# Patient Record
Sex: FEMALE | Race: WHITE | NOT HISPANIC OR LATINO | Employment: OTHER | URBAN - METROPOLITAN AREA
[De-identification: names, ages, dates, MRNs, and addresses within clinical notes are randomized per-mention and may not be internally consistent; named-entity substitution may affect disease eponyms.]

---

## 2017-06-12 ENCOUNTER — TRANSCRIBE ORDERS (OUTPATIENT)
Dept: ADMINISTRATIVE | Facility: HOSPITAL | Age: 62
End: 2017-06-12

## 2017-06-12 DIAGNOSIS — Z12.31 VISIT FOR SCREENING MAMMOGRAM: Primary | ICD-10-CM

## 2017-07-19 ENCOUNTER — HOSPITAL ENCOUNTER (OUTPATIENT)
Dept: MAMMOGRAPHY | Facility: HOSPITAL | Age: 62
Discharge: HOME | End: 2017-07-19
Admitting: FAMILY MEDICINE

## 2017-07-19 DIAGNOSIS — Z12.31 VISIT FOR SCREENING MAMMOGRAM: ICD-10-CM

## 2017-07-19 PROCEDURE — 77067 SCR MAMMO BI INCL CAD: CPT | Performed by: RADIOLOGY

## 2017-07-19 PROCEDURE — G0202 SCR MAMMO BI INCL CAD: HCPCS

## 2017-07-19 PROCEDURE — 77063 BREAST TOMOSYNTHESIS BI: CPT

## 2017-07-19 PROCEDURE — 77063 BREAST TOMOSYNTHESIS BI: CPT | Performed by: RADIOLOGY

## 2017-09-13 ENCOUNTER — HOSPITAL ENCOUNTER (EMERGENCY)
Facility: HOSPITAL | Age: 62
Discharge: HOME | End: 2017-09-13
Attending: EMERGENCY MEDICINE | Admitting: EMERGENCY MEDICINE

## 2017-09-13 ENCOUNTER — APPOINTMENT (OUTPATIENT)
Dept: CARDIOLOGY | Facility: HOSPITAL | Age: 62
End: 2017-09-13
Attending: EMERGENCY MEDICINE

## 2017-09-13 VITALS
BODY MASS INDEX: 29.73 KG/M2 | WEIGHT: 185 LBS | HEIGHT: 66 IN | DIASTOLIC BLOOD PRESSURE: 89 MMHG | SYSTOLIC BLOOD PRESSURE: 151 MMHG | RESPIRATION RATE: 16 BRPM | TEMPERATURE: 97.6 F | HEART RATE: 81 BPM | OXYGEN SATURATION: 96 %

## 2017-09-13 DIAGNOSIS — M79.10 MUSCLE SORENESS: ICD-10-CM

## 2017-09-13 DIAGNOSIS — E86.0 DEHYDRATION: Primary | ICD-10-CM

## 2017-09-13 LAB
ALBUMIN SERPL-MCNC: 4.7 G/DL (ref 3.2–4.8)
ALBUMIN/GLOB SERPL: 2 G/DL (ref 1.5–2.5)
ALP SERPL-CCNC: 74 U/L (ref 25–100)
ALT SERPL W P-5'-P-CCNC: 36 U/L (ref 7–40)
ANION GAP SERPL CALCULATED.3IONS-SCNC: 5 MMOL/L (ref 3–11)
AST SERPL-CCNC: 32 U/L (ref 0–33)
BASOPHILS # BLD AUTO: 0.02 10*3/MM3 (ref 0–0.2)
BASOPHILS NFR BLD AUTO: 0.4 % (ref 0–1)
BH CV LOWER VASCULAR LEFT COMMON FEMORAL AUGMENT: NORMAL
BH CV LOWER VASCULAR LEFT COMMON FEMORAL COMPRESS: NORMAL
BH CV LOWER VASCULAR LEFT COMMON FEMORAL PHASIC: NORMAL
BH CV LOWER VASCULAR LEFT COMMON FEMORAL SPONT: NORMAL
BH CV LOWER VASCULAR LEFT DISTAL FEMORAL COMPRESS: NORMAL
BH CV LOWER VASCULAR LEFT GASTRONEMIUS COMPRESS: NORMAL
BH CV LOWER VASCULAR LEFT GREATER SAPH AK COMPRESS: NORMAL
BH CV LOWER VASCULAR LEFT GREATER SAPH BK COMPRESS: NORMAL
BH CV LOWER VASCULAR LEFT LESSER SAPH COMPRESS: NORMAL
BH CV LOWER VASCULAR LEFT MID FEMORAL AUGMENT: NORMAL
BH CV LOWER VASCULAR LEFT MID FEMORAL COMPRESS: NORMAL
BH CV LOWER VASCULAR LEFT MID FEMORAL PHASIC: NORMAL
BH CV LOWER VASCULAR LEFT MID FEMORAL SPONT: NORMAL
BH CV LOWER VASCULAR LEFT PERONEAL COMPRESS: NORMAL
BH CV LOWER VASCULAR LEFT POPLITEAL AUGMENT: NORMAL
BH CV LOWER VASCULAR LEFT POPLITEAL COMPRESS: NORMAL
BH CV LOWER VASCULAR LEFT POPLITEAL PHASIC: NORMAL
BH CV LOWER VASCULAR LEFT POPLITEAL SPONT: NORMAL
BH CV LOWER VASCULAR LEFT POSTERIOR TIBIAL COMPRESS: NORMAL
BH CV LOWER VASCULAR LEFT PROXIMAL FEMORAL COMPRESS: NORMAL
BH CV LOWER VASCULAR LEFT SAPHENOFEMORAL JUNCTION AUGMENT: NORMAL
BH CV LOWER VASCULAR LEFT SAPHENOFEMORAL JUNCTION COMPRESS: NORMAL
BH CV LOWER VASCULAR LEFT SAPHENOFEMORAL JUNCTION PHASIC: NORMAL
BH CV LOWER VASCULAR LEFT SAPHENOFEMORAL JUNCTION SPONT: NORMAL
BH CV LOWER VASCULAR RIGHT COMMON FEMORAL AUGMENT: NORMAL
BH CV LOWER VASCULAR RIGHT COMMON FEMORAL COMPRESS: NORMAL
BH CV LOWER VASCULAR RIGHT COMMON FEMORAL PHASIC: NORMAL
BH CV LOWER VASCULAR RIGHT COMMON FEMORAL SPONT: NORMAL
BH CV LOWER VASCULAR RIGHT DISTAL FEMORAL COMPRESS: NORMAL
BH CV LOWER VASCULAR RIGHT GASTRONEMIUS COMPRESS: NORMAL
BH CV LOWER VASCULAR RIGHT GREATER SAPH AK COMPRESS: NORMAL
BH CV LOWER VASCULAR RIGHT GREATER SAPH BK COMPRESS: NORMAL
BH CV LOWER VASCULAR RIGHT LESSER SAPH COMPRESS: NORMAL
BH CV LOWER VASCULAR RIGHT MID FEMORAL AUGMENT: NORMAL
BH CV LOWER VASCULAR RIGHT MID FEMORAL COMPETENT: NORMAL
BH CV LOWER VASCULAR RIGHT MID FEMORAL COMPRESS: NORMAL
BH CV LOWER VASCULAR RIGHT MID FEMORAL PHASIC: NORMAL
BH CV LOWER VASCULAR RIGHT MID FEMORAL SPONT: NORMAL
BH CV LOWER VASCULAR RIGHT PERONEAL COMPRESS: NORMAL
BH CV LOWER VASCULAR RIGHT POPLITEAL AUGMENT: NORMAL
BH CV LOWER VASCULAR RIGHT POPLITEAL COMPRESS: NORMAL
BH CV LOWER VASCULAR RIGHT POPLITEAL PHASIC: NORMAL
BH CV LOWER VASCULAR RIGHT POPLITEAL SPONT: NORMAL
BH CV LOWER VASCULAR RIGHT POSTERIOR TIBIAL COMPRESS: NORMAL
BH CV LOWER VASCULAR RIGHT PROXIMAL FEMORAL COMPRESS: NORMAL
BH CV LOWER VASCULAR RIGHT SAPHENOFEMORAL JUNCTION AUGMENT: NORMAL
BH CV LOWER VASCULAR RIGHT SAPHENOFEMORAL JUNCTION COMPRESS: NORMAL
BH CV LOWER VASCULAR RIGHT SAPHENOFEMORAL JUNCTION PHASIC: NORMAL
BH CV LOWER VASCULAR RIGHT SAPHENOFEMORAL JUNCTION SPONT: NORMAL
BILIRUB SERPL-MCNC: 0.7 MG/DL (ref 0.3–1.2)
BUN BLD-MCNC: 18 MG/DL (ref 9–23)
BUN/CREAT SERPL: 22.5 (ref 7–25)
CALCIUM SPEC-SCNC: 9.3 MG/DL (ref 8.7–10.4)
CHLORIDE SERPL-SCNC: 105 MMOL/L (ref 99–109)
CK SERPL-CCNC: 232 U/L (ref 26–174)
CO2 SERPL-SCNC: 27 MMOL/L (ref 20–31)
CREAT BLD-MCNC: 0.8 MG/DL (ref 0.6–1.3)
DEPRECATED RDW RBC AUTO: 39.5 FL (ref 37–54)
EOSINOPHIL # BLD AUTO: 0.17 10*3/MM3 (ref 0–0.3)
EOSINOPHIL NFR BLD AUTO: 3 % (ref 0–3)
ERYTHROCYTE [DISTWIDTH] IN BLOOD BY AUTOMATED COUNT: 12.3 % (ref 11.3–14.5)
GFR SERPL CREATININE-BSD FRML MDRD: 73 ML/MIN/1.73
GLOBULIN UR ELPH-MCNC: 2.4 GM/DL
GLUCOSE BLD-MCNC: 78 MG/DL (ref 70–100)
HCT VFR BLD AUTO: 45.1 % (ref 34.5–44)
HGB BLD-MCNC: 15.1 G/DL (ref 11.5–15.5)
IMM GRANULOCYTES # BLD: 0.02 10*3/MM3 (ref 0–0.03)
IMM GRANULOCYTES NFR BLD: 0.4 % (ref 0–0.6)
LYMPHOCYTES # BLD AUTO: 1.85 10*3/MM3 (ref 0.6–4.8)
LYMPHOCYTES NFR BLD AUTO: 32.5 % (ref 24–44)
MCH RBC QN AUTO: 29 PG (ref 27–31)
MCHC RBC AUTO-ENTMCNC: 33.5 G/DL (ref 32–36)
MCV RBC AUTO: 86.7 FL (ref 80–99)
MONOCYTES # BLD AUTO: 0.75 10*3/MM3 (ref 0–1)
MONOCYTES NFR BLD AUTO: 13.2 % (ref 0–12)
NEUTROPHILS # BLD AUTO: 2.89 10*3/MM3 (ref 1.5–8.3)
NEUTROPHILS NFR BLD AUTO: 50.5 % (ref 41–71)
PLATELET # BLD AUTO: 178 10*3/MM3 (ref 150–450)
PMV BLD AUTO: 9 FL (ref 6–12)
POTASSIUM BLD-SCNC: 3.9 MMOL/L (ref 3.5–5.5)
PROT SERPL-MCNC: 7.1 G/DL (ref 5.7–8.2)
RBC # BLD AUTO: 5.2 10*6/MM3 (ref 3.89–5.14)
SODIUM BLD-SCNC: 137 MMOL/L (ref 132–146)
WBC NRBC COR # BLD: 5.7 10*3/MM3 (ref 3.5–10.8)

## 2017-09-13 PROCEDURE — 80053 COMPREHEN METABOLIC PANEL: CPT | Performed by: EMERGENCY MEDICINE

## 2017-09-13 PROCEDURE — 93970 EXTREMITY STUDY: CPT

## 2017-09-13 PROCEDURE — 85025 COMPLETE CBC W/AUTO DIFF WBC: CPT | Performed by: EMERGENCY MEDICINE

## 2017-09-13 PROCEDURE — 99283 EMERGENCY DEPT VISIT LOW MDM: CPT

## 2017-09-13 PROCEDURE — 93970 EXTREMITY STUDY: CPT | Performed by: INTERNAL MEDICINE

## 2017-09-13 PROCEDURE — 82550 ASSAY OF CK (CPK): CPT | Performed by: EMERGENCY MEDICINE

## 2017-09-13 RX ORDER — SODIUM CHLORIDE 0.9 % (FLUSH) 0.9 %
10 SYRINGE (ML) INJECTION AS NEEDED
Status: DISCONTINUED | OUTPATIENT
Start: 2017-09-13 | End: 2017-09-13 | Stop reason: HOSPADM

## 2017-09-13 RX ORDER — TRAMADOL HYDROCHLORIDE 50 MG/1
50 TABLET ORAL EVERY 6 HOURS PRN
Qty: 20 TABLET | Refills: 0 | Status: SHIPPED | OUTPATIENT
Start: 2017-09-13 | End: 2022-12-13

## 2017-09-13 RX ORDER — TRAMADOL HYDROCHLORIDE 50 MG/1
50 TABLET ORAL ONCE
Status: COMPLETED | OUTPATIENT
Start: 2017-09-13 | End: 2017-09-13

## 2017-09-13 RX ADMIN — SODIUM CHLORIDE 1000 ML: 9 INJECTION, SOLUTION INTRAVENOUS at 12:19

## 2017-09-13 RX ADMIN — TRAMADOL HYDROCHLORIDE 50 MG: 50 TABLET, COATED ORAL at 12:14

## 2017-09-13 NOTE — ED PROVIDER NOTES
"Subjective   HPI Comments: Stephani Paulson is a 61 y.o.female who presents to the ED with c/o leg pain after prolonged immobilization. She reports that she recently returned from a trip to [place] three days ago. Since arriving home, she has experienced an itchy rash and a gradually worsening, \"stabbing\" pain in the bilateral lower extremities from mid thigh down to the ankles that is worse posteriorly. She came to the ED to rule out DVT. She denies leg swelling, fever, chest pain, difficulty breathing, abdominal pain, or vomiting. Other than some intermittent nausea, urinary frequency, and mild constipation, she has no other acute complaints at this time.     She does take an estrogen/progesterone supplement and thyroid medication. She has no history of prior DVT's/PE's. She denies any recent surgeries.     Patient is a 61 y.o. female presenting with lower extremity pain.   History provided by:  Patient  Lower Extremity Issue   Location:  Leg  Time since incident:  3 days  Injury: no    Leg location:  L leg and R leg  Pain details:     Quality:  Sharp (\"stabbing\")    Radiates to:  Does not radiate    Severity:  Moderate    Onset quality:  Gradual    Duration:  3 days    Timing:  Constant    Progression:  Worsening  Chronicity:  New  Dislocation: no    Foreign body present:  No foreign bodies  Prior injury to area:  No  Relieved by:  Nothing  Worsened by:  Nothing  Ineffective treatments:  None tried  Associated symptoms: no back pain, no fever, no neck pain, no numbness, no swelling and no tingling    Risk factors comment:  Recent prolonged travel/immobilization      Review of Systems   Constitutional: Negative for chills and fever.   HENT: Negative for congestion, rhinorrhea, sore throat and trouble swallowing.    Respiratory: Negative for cough and shortness of breath.    Cardiovascular: Negative for chest pain and leg swelling.   Gastrointestinal: Positive for constipation and nausea. Negative for " "abdominal pain, diarrhea and vomiting.   Genitourinary: Positive for frequency. Negative for dysuria and hematuria.   Musculoskeletal: Negative for back pain and neck pain.        Pain in the bilateral lower extremities   Skin: Positive for rash.   Neurological: Negative for dizziness, weakness and headaches.   All other systems reviewed and are negative.      History reviewed. No pertinent past medical history.    No Known Allergies    Past Surgical History:   Procedure Laterality Date   • BREAST EXCISIONAL BIOPSY Left 1980   • HYSTERECTOMY  2004   • OOPHORECTOMY Bilateral 2004   • REDUCTION MAMMAPLASTY Bilateral 2013    AND LIFT       Family History   Problem Relation Age of Onset   • Ovarian cancer Neg Hx    • Breast cancer Neg Hx        Social History     Social History   • Marital status:      Spouse name: N/A   • Number of children: N/A   • Years of education: N/A     Social History Main Topics   • Smoking status: Never Smoker   • Smokeless tobacco: None   • Alcohol use No   • Drug use: No   • Sexual activity: Not Asked     Other Topics Concern   • None     Social History Narrative   • None         Objective   Physical Exam   Constitutional: She is oriented to person, place, and time. She appears well-developed and well-nourished. No distress.   HENT:   Head: Normocephalic and atraumatic.   Nose: Nose normal.   Mouth/Throat: Oropharynx is clear and moist.   Eyes: Conjunctivae are normal. No scleral icterus.   Neck: Normal range of motion. Neck supple.   Cardiovascular: Normal rate, regular rhythm and normal heart sounds.    No murmur heard.  Pulmonary/Chest: Effort normal and breath sounds normal. No respiratory distress. She has no wheezes. She has no rales.   Abdominal: Soft. Bowel sounds are normal. She exhibits no mass. There is no tenderness. There is no rebound and no guarding.   Musculoskeletal: Normal range of motion. She exhibits tenderness (mild \"tingling\"). She exhibits no edema.   The " patient reports tingles to palpation of the muscles in the bilateral calves and quadricepts. She denies tenderness to palpation of the thighs. There is no swelling of the legs. No significant edema. Equal calf size.   Neurological: She is alert and oriented to person, place, and time.   Skin: Skin is warm and dry. Rash noted. No erythema.   Previous rash to the bilateral inguinal region appears to be improving with small scabs present. No significant redness.   Psychiatric: She has a normal mood and affect. Her behavior is normal.   Nursing note and vitals reviewed.      Procedures         ED Course  ED Course   Value Comment By Time    Dr. Cornelius is at the bedside reevaluating the patient. She does not meet criteria for rhabdomyolysis. Most likely etiology is sore muscles from prolonged immobilization, dehydration, and travel. Will continue to administer IV fluids and discharge with advise to limit physical activity and push fluids for the next few days. Jessica Romero 09/13 1303   Creatine Kinase: (!) 232 (Reviewed) Jessica Romero 09/13 1308     Recent Results (from the past 24 hour(s))   Comprehensive Metabolic Panel    Collection Time: 09/13/17 12:07 PM   Result Value Ref Range    Glucose 78 70 - 100 mg/dL    BUN 18 9 - 23 mg/dL    Creatinine 0.80 0.60 - 1.30 mg/dL    Sodium 137 132 - 146 mmol/L    Potassium 3.9 3.5 - 5.5 mmol/L    Chloride 105 99 - 109 mmol/L    CO2 27.0 20.0 - 31.0 mmol/L    Calcium 9.3 8.7 - 10.4 mg/dL    Total Protein 7.1 5.7 - 8.2 g/dL    Albumin 4.70 3.20 - 4.80 g/dL    ALT (SGPT) 36 7 - 40 U/L    AST (SGOT) 32 0 - 33 U/L    Alkaline Phosphatase 74 25 - 100 U/L    Total Bilirubin 0.7 0.3 - 1.2 mg/dL    eGFR Non African Amer 73 >60 mL/min/1.73    Globulin 2.4 gm/dL    A/G Ratio 2.0 1.5 - 2.5 g/dL    BUN/Creatinine Ratio 22.5 7.0 - 25.0    Anion Gap 5.0 3.0 - 11.0 mmol/L   CK    Collection Time: 09/13/17 12:07 PM   Result Value Ref Range    Creatine Kinase 232 (H) 26 - 174 U/L   CBC Auto  Differential    Collection Time: 09/13/17 12:07 PM   Result Value Ref Range    WBC 5.70 3.50 - 10.80 10*3/mm3    RBC 5.20 (H) 3.89 - 5.14 10*6/mm3    Hemoglobin 15.1 11.5 - 15.5 g/dL    Hematocrit 45.1 (H) 34.5 - 44.0 %    MCV 86.7 80.0 - 99.0 fL    MCH 29.0 27.0 - 31.0 pg    MCHC 33.5 32.0 - 36.0 g/dL    RDW 12.3 11.3 - 14.5 %    RDW-SD 39.5 37.0 - 54.0 fl    MPV 9.0 6.0 - 12.0 fL    Platelets 178 150 - 450 10*3/mm3    Neutrophil % 50.5 41.0 - 71.0 %    Lymphocyte % 32.5 24.0 - 44.0 %    Monocyte % 13.2 (H) 0.0 - 12.0 %    Eosinophil % 3.0 0.0 - 3.0 %    Basophil % 0.4 0.0 - 1.0 %    Immature Grans % 0.4 0.0 - 0.6 %    Neutrophils, Absolute 2.89 1.50 - 8.30 10*3/mm3    Lymphocytes, Absolute 1.85 0.60 - 4.80 10*3/mm3    Monocytes, Absolute 0.75 0.00 - 1.00 10*3/mm3    Eosinophils, Absolute 0.17 0.00 - 0.30 10*3/mm3    Basophils, Absolute 0.02 0.00 - 0.20 10*3/mm3    Immature Grans, Absolute 0.02 0.00 - 0.03 10*3/mm3   Duplex Venous Lower Extremity - Bilateral CAR    Collection Time: 09/13/17 12:09 PM   Result Value Ref Range    Right Common Femoral Spont Y     Right Common Femoral Phasic Y     Right Common Femoral Augment Y     Right Common Femoral Compress C     Right Saphenofemoral Junction Spont Y     Right Saphenofemoral Junction Phasic Y     Right Saphenofemoral Junction Augment Y     Right Saphenofemoral Junction Compress C     Right Proximal Femoral Compress C     Right Mid Femoral Spont Y     Right Mid Femoral Phasic Y     Right Mid Femoral Augment Y     Right Mid Femoral Compress C     Right Distal Femoral Compress C     Right Popliteal Spont Y     Right Popliteal Phasic Y     Right Popliteal Augment Y     Right Popliteal Compress C     Right Posterior Tibial Compress C     Right Peroneal Compress C     Right GastronemiusSoleal Compress C     Right Greater Saph AK Compress C     Right Greater Saph BK Compress C     Right Lesser Saph Compress C     Left Common Femoral Spont Y     Left Common Femoral Phasic Y  "    Left Common Femoral Augment Y     Left Common Femoral Compress C     Left Saphenofemoral Junction Spont Y     Left Saphenofemoral Junction Phasic Y     Left Saphenofemoral Junction Augment Y     Left Saphenofemoral Junction Compress C     Left Proximal Femoral Compress C     Left Mid Femoral Spont Y     Left Mid Femoral Phasic Y     Left Mid Femoral Augment Y     Left Mid Femoral Compress C     Left Distal Femoral Compress C     Left Popliteal Spont Y     Left Popliteal Phasic Y     Left Popliteal Augment Y     Left Popliteal Compress C     Left Posterior Tibial Compress C     Left Peroneal Compress C     Left GastronemiusSoleal Compress C     Left Greater Saph AK Compress C     Left Greater Saph BK Compress C     Left Lesser Saph Compress C     Right Mid Femoral Competent Y      Note: In addition to lab results from this visit, the labs listed above may include labs taken at another facility or during a different encounter within the last 24 hours. Please correlate lab times with ED admission and discharge times for further clarification of the services performed during this visit.    No orders to display     Vitals:    09/13/17 1058 09/13/17 1059   BP:  (!) 153/112   BP Location:  Right arm   Patient Position:  Lying   Pulse:  84   Resp:  18   Temp:  98.1 °F (36.7 °C)   TempSrc:  Oral   SpO2:  98%   Weight: 185 lb (83.9 kg)    Height: 66\" (167.6 cm)      Medications   sodium chloride 0.9 % flush 10 mL (not administered)   sodium chloride 0.9 % bolus 1,000 mL (1,000 mL Intravenous New Bag 9/13/17 1219)   traMADol (ULTRAM) tablet 50 mg (50 mg Oral Given 9/13/17 1214)     ECG/EMG Results (last 24 hours)     ** No results found for the last 24 hours. **                       MDM  Number of Diagnoses or Management Options  Dehydration: new and requires workup  Muscle soreness: new and requires workup  Diagnosis management comments: Presentation is consistent with muscle soreness.    Bilateral venous duplex does not " show DVT.    Laboratory evaluation shows mild dehydration, and mildly elevated creatinine kinase.    Current symptoms are felt to be muscle soreness.    Given Ultram to help control pain, if it is not controlled by Tylenol and ibuprofen over-the-counter.    Patient given IV fluids here in ER, will be advised to drink plenty of fluids at home.    Advised to stretch, and is okay to perform light activity but no strenuous or prolonged activity.       Amount and/or Complexity of Data Reviewed  Clinical lab tests: ordered and reviewed  Tests in the radiology section of CPT®: ordered and reviewed  Obtain history from someone other than the patient: yes  Review and summarize past medical records: yes  Independent visualization of images, tracings, or specimens: yes    Patient Progress  Patient progress: stable      Final diagnoses:   Dehydration   Muscle soreness       Documentation assistance provided by alis Romero.  Information recorded by the scribe was done at my direction and has been verified and validated by me.     Jessica Romero  09/13/17 1134       Miriam Cornelius MD  09/13/17 1321       Miriam Cornelius MD  09/13/17 3365

## 2017-09-13 NOTE — DISCHARGE INSTRUCTIONS
Push fluids for the next few days.    Limit physical activity as needed for sore muscles.     Immediately return to the emergency department if you develop new or worsening symptoms.    Followup with PCP within next week for recheck.       Hypertension  Hypertension, commonly called high blood pressure, is when the force of blood pumping through your arteries is too strong. Your arteries are the blood vessels that carry blood from your heart throughout your body. A blood pressure reading consists of a higher number over a lower number, such as 110/72. The higher number (systolic) is the pressure inside your arteries when your heart pumps. The lower number (diastolic) is the pressure inside your arteries when your heart relaxes. Ideally you want your blood pressure below 120/80.  Hypertension forces your heart to work harder to pump blood. Your arteries may become narrow or stiff. Having untreated or uncontrolled hypertension can cause heart attack, stroke, kidney disease, and other problems.  RISK FACTORS  Some risk factors for high blood pressure are controllable. Others are not.   Risk factors you cannot control include:   · Race. You may be at higher risk if you are .  · Age. Risk increases with age.  · Gender. Men are at higher risk than women before age 45 years. After age 65, women are at higher risk than men.  Risk factors you can control include:  · Not getting enough exercise or physical activity.  · Being overweight.  · Getting too much fat, sugar, calories, or salt in your diet.  · Drinking too much alcohol.  SIGNS AND SYMPTOMS  Hypertension does not usually cause signs or symptoms. Extremely high blood pressure (hypertensive crisis) may cause headache, anxiety, shortness of breath, and nosebleed.  DIAGNOSIS  To check if you have hypertension, your health care provider will measure your blood pressure while you are seated, with your arm held at the level of your heart. It should be  measured at least twice using the same arm. Certain conditions can cause a difference in blood pressure between your right and left arms. A blood pressure reading that is higher than normal on one occasion does not mean that you need treatment. If it is not clear whether you have high blood pressure, you may be asked to return on a different day to have your blood pressure checked again. Or, you may be asked to monitor your blood pressure at home for 1 or more weeks.  TREATMENT  Treating high blood pressure includes making lifestyle changes and possibly taking medicine. Living a healthy lifestyle can help lower high blood pressure. You may need to change some of your habits.  Lifestyle changes may include:  · Following the DASH diet. This diet is high in fruits, vegetables, and whole grains. It is low in salt, red meat, and added sugars.  · Keep your sodium intake below 2,300 mg per day.  · Getting at least 30-45 minutes of aerobic exercise at least 4 times per week.  · Losing weight if necessary.  · Not smoking.  · Limiting alcoholic beverages.  · Learning ways to reduce stress.  Your health care provider may prescribe medicine if lifestyle changes are not enough to get your blood pressure under control, and if one of the following is true:  · You are 18-59 years of age and your systolic blood pressure is above 140.  · You are 60 years of age or older, and your systolic blood pressure is above 150.  · Your diastolic blood pressure is above 90.  · You have diabetes, and your systolic blood pressure is over 140 or your diastolic blood pressure is over 90.  · You have kidney disease and your blood pressure is above 140/90.  · You have heart disease and your blood pressure is above 140/90.  Your personal target blood pressure may vary depending on your medical conditions, your age, and other factors.  HOME CARE INSTRUCTIONS  · Have your blood pressure rechecked as directed by your health care provider.    · Take  medicines only as directed by your health care provider. Follow the directions carefully. Blood pressure medicines must be taken as prescribed. The medicine does not work as well when you skip doses. Skipping doses also puts you at risk for problems.  · Do not smoke.    · Monitor your blood pressure at home as directed by your health care provider.   SEEK MEDICAL CARE IF:   · You think you are having a reaction to medicines taken.  · You have recurrent headaches or feel dizzy.  · You have swelling in your ankles.  · You have trouble with your vision.  SEEK IMMEDIATE MEDICAL CARE IF:  · You develop a severe headache or confusion.  · You have unusual weakness, numbness, or feel faint.  · You have severe chest or abdominal pain.  · You vomit repeatedly.  · You have trouble breathing.  MAKE SURE YOU:   · Understand these instructions.  · Will watch your condition.  · Will get help right away if you are not doing well or get worse.     This information is not intended to replace advice given to you by your health care provider. Make sure you discuss any questions you have with your health care provider.     Document Released: 12/18/2006 Document Revised: 05/03/2016 Document Reviewed: 10/10/2014  [employer] Patient Education ©2017 [employer]

## 2018-06-12 ENCOUNTER — TRANSCRIBE ORDERS (OUTPATIENT)
Dept: ADMINISTRATIVE | Facility: HOSPITAL | Age: 63
End: 2018-06-12

## 2018-06-12 DIAGNOSIS — Z12.31 VISIT FOR SCREENING MAMMOGRAM: Primary | ICD-10-CM

## 2018-07-24 ENCOUNTER — HOSPITAL ENCOUNTER (OUTPATIENT)
Dept: MAMMOGRAPHY | Facility: HOSPITAL | Age: 63
Discharge: HOME | End: 2018-07-24
Admitting: FAMILY MEDICINE

## 2018-07-24 DIAGNOSIS — Z12.31 VISIT FOR SCREENING MAMMOGRAM: ICD-10-CM

## 2018-07-24 PROCEDURE — 77067 SCR MAMMO BI INCL CAD: CPT | Performed by: RADIOLOGY

## 2018-07-24 PROCEDURE — 77063 BREAST TOMOSYNTHESIS BI: CPT | Performed by: RADIOLOGY

## 2018-07-24 PROCEDURE — 77067 SCR MAMMO BI INCL CAD: CPT

## 2018-07-24 PROCEDURE — 77063 BREAST TOMOSYNTHESIS BI: CPT

## 2018-08-01 ENCOUNTER — HOSPITAL ENCOUNTER (OUTPATIENT)
Dept: MAMMOGRAPHY | Facility: HOSPITAL | Age: 63
Discharge: HOME | End: 2018-08-01
Admitting: RADIOLOGY

## 2018-08-01 DIAGNOSIS — R92.8 ABNORMAL MAMMOGRAM: ICD-10-CM

## 2018-08-01 PROCEDURE — G0279 TOMOSYNTHESIS, MAMMO: HCPCS

## 2018-08-01 PROCEDURE — 77065 DX MAMMO INCL CAD UNI: CPT

## 2018-08-01 PROCEDURE — 77061 BREAST TOMOSYNTHESIS UNI: CPT | Performed by: RADIOLOGY

## 2018-08-01 PROCEDURE — 77066 DX MAMMO INCL CAD BI: CPT

## 2018-08-01 PROCEDURE — 77065 DX MAMMO INCL CAD UNI: CPT | Performed by: RADIOLOGY

## 2019-02-20 ENCOUNTER — TRANSCRIBE ORDERS (OUTPATIENT)
Dept: ADMINISTRATIVE | Facility: HOSPITAL | Age: 64
End: 2019-02-20

## 2019-02-20 DIAGNOSIS — R92.8 ABNORMAL MAMMOGRAM: Primary | ICD-10-CM

## 2019-03-26 ENCOUNTER — TRANSCRIBE ORDERS (OUTPATIENT)
Dept: MAMMOGRAPHY | Facility: HOSPITAL | Age: 64
End: 2019-03-26

## 2019-03-26 ENCOUNTER — HOSPITAL ENCOUNTER (OUTPATIENT)
Dept: MAMMOGRAPHY | Facility: HOSPITAL | Age: 64
Discharge: HOME | End: 2019-03-26
Admitting: FAMILY MEDICINE

## 2019-03-26 DIAGNOSIS — R92.8 ABNORMAL MAMMOGRAM: ICD-10-CM

## 2019-03-26 DIAGNOSIS — Z12.31 VISIT FOR SCREENING MAMMOGRAM: Primary | ICD-10-CM

## 2019-03-26 PROCEDURE — 77065 DX MAMMO INCL CAD UNI: CPT | Performed by: RADIOLOGY

## 2019-03-26 PROCEDURE — 77065 DX MAMMO INCL CAD UNI: CPT

## 2019-03-27 ENCOUNTER — APPOINTMENT (OUTPATIENT)
Dept: MAMMOGRAPHY | Facility: HOSPITAL | Age: 64
End: 2019-03-27

## 2019-04-23 ENCOUNTER — TELEPHONE (OUTPATIENT)
Dept: MRI IMAGING | Facility: HOSPITAL | Age: 64
End: 2019-04-23

## 2019-04-23 NOTE — TELEPHONE ENCOUNTER
Pt called the  and said she had Breast US done at [facility] and she would not be needing the Breast MRI. It was cancelled for April 30th.

## 2019-04-30 ENCOUNTER — APPOINTMENT (OUTPATIENT)
Dept: MRI IMAGING | Facility: HOSPITAL | Age: 64
End: 2019-04-30

## 2019-05-29 ENCOUNTER — APPOINTMENT (OUTPATIENT)
Dept: MAMMOGRAPHY | Facility: HOSPITAL | Age: 64
End: 2019-05-29

## 2019-07-30 ENCOUNTER — APPOINTMENT (OUTPATIENT)
Dept: MAMMOGRAPHY | Facility: HOSPITAL | Age: 64
End: 2019-07-30

## 2019-08-15 ENCOUNTER — HOSPITAL ENCOUNTER (OUTPATIENT)
Dept: MAMMOGRAPHY | Facility: HOSPITAL | Age: 64
Discharge: HOME | End: 2019-08-15
Admitting: FAMILY MEDICINE

## 2019-08-15 DIAGNOSIS — Z12.31 VISIT FOR SCREENING MAMMOGRAM: ICD-10-CM

## 2019-08-15 PROCEDURE — 77063 BREAST TOMOSYNTHESIS BI: CPT | Performed by: RADIOLOGY

## 2019-08-15 PROCEDURE — 77063 BREAST TOMOSYNTHESIS BI: CPT

## 2019-08-15 PROCEDURE — 77067 SCR MAMMO BI INCL CAD: CPT | Performed by: RADIOLOGY

## 2019-08-15 PROCEDURE — 77067 SCR MAMMO BI INCL CAD: CPT

## 2020-01-02 ENCOUNTER — HOSPITAL ENCOUNTER (EMERGENCY)
Facility: HOSPITAL | Age: 65
Discharge: HOME | End: 2020-01-02
Attending: EMERGENCY MEDICINE | Admitting: EMERGENCY MEDICINE

## 2020-01-02 ENCOUNTER — APPOINTMENT (OUTPATIENT)
Dept: GENERAL RADIOLOGY | Facility: HOSPITAL | Age: 65
End: 2020-01-02

## 2020-01-02 VITALS
RESPIRATION RATE: 16 BRPM | HEART RATE: 84 BPM | DIASTOLIC BLOOD PRESSURE: 70 MMHG | BODY MASS INDEX: 30.53 KG/M2 | WEIGHT: 190 LBS | HEIGHT: 66 IN | TEMPERATURE: 98 F | OXYGEN SATURATION: 97 % | SYSTOLIC BLOOD PRESSURE: 147 MMHG

## 2020-01-02 DIAGNOSIS — J20.8 ACUTE BRONCHITIS, BACTERIAL: Primary | ICD-10-CM

## 2020-01-02 DIAGNOSIS — B96.89 ACUTE BRONCHITIS, BACTERIAL: Primary | ICD-10-CM

## 2020-01-02 LAB
ALBUMIN SERPL-MCNC: 4.6 G/DL (ref 3.5–5.2)
ALBUMIN/GLOB SERPL: 2 G/DL
ALP SERPL-CCNC: 68 U/L (ref 39–117)
ALT SERPL W P-5'-P-CCNC: 32 U/L (ref 1–33)
ANION GAP SERPL CALCULATED.3IONS-SCNC: 11 MMOL/L (ref 5–15)
AST SERPL-CCNC: 22 U/L (ref 1–32)
BASOPHILS # BLD AUTO: 0.05 10*3/MM3 (ref 0–0.2)
BASOPHILS NFR BLD AUTO: 0.7 % (ref 0–1.5)
BILIRUB SERPL-MCNC: 0.3 MG/DL (ref 0.2–1.2)
BUN BLD-MCNC: 18 MG/DL (ref 8–23)
BUN/CREAT SERPL: 24.7 (ref 7–25)
CALCIUM SPEC-SCNC: 9.8 MG/DL (ref 8.6–10.5)
CHLORIDE SERPL-SCNC: 105 MMOL/L (ref 98–107)
CO2 SERPL-SCNC: 25 MMOL/L (ref 22–29)
CREAT BLD-MCNC: 0.73 MG/DL (ref 0.57–1)
DEPRECATED RDW RBC AUTO: 38.9 FL (ref 37–54)
EOSINOPHIL # BLD AUTO: 0.16 10*3/MM3 (ref 0–0.4)
EOSINOPHIL NFR BLD AUTO: 2.2 % (ref 0.3–6.2)
ERYTHROCYTE [DISTWIDTH] IN BLOOD BY AUTOMATED COUNT: 11.9 % (ref 12.3–15.4)
GFR SERPL CREATININE-BSD FRML MDRD: 81 ML/MIN/1.73
GLOBULIN UR ELPH-MCNC: 2.3 GM/DL
GLUCOSE BLD-MCNC: 86 MG/DL (ref 65–99)
HCT VFR BLD AUTO: 43.4 % (ref 34–46.6)
HGB BLD-MCNC: 14.3 G/DL (ref 12–15.9)
IMM GRANULOCYTES # BLD AUTO: 0.03 10*3/MM3 (ref 0–0.05)
IMM GRANULOCYTES NFR BLD AUTO: 0.4 % (ref 0–0.5)
LYMPHOCYTES # BLD AUTO: 1.77 10*3/MM3 (ref 0.7–3.1)
LYMPHOCYTES NFR BLD AUTO: 24.1 % (ref 19.6–45.3)
MCH RBC QN AUTO: 29.5 PG (ref 26.6–33)
MCHC RBC AUTO-ENTMCNC: 32.9 G/DL (ref 31.5–35.7)
MCV RBC AUTO: 89.7 FL (ref 79–97)
MONOCYTES # BLD AUTO: 0.67 10*3/MM3 (ref 0.1–0.9)
MONOCYTES NFR BLD AUTO: 9.1 % (ref 5–12)
NEUTROPHILS # BLD AUTO: 4.66 10*3/MM3 (ref 1.7–7)
NEUTROPHILS NFR BLD AUTO: 63.5 % (ref 42.7–76)
NRBC BLD AUTO-RTO: 0 /100 WBC (ref 0–0.2)
NT-PROBNP SERPL-MCNC: 90 PG/ML (ref 5–900)
PLATELET # BLD AUTO: 239 10*3/MM3 (ref 140–450)
PMV BLD AUTO: 9.3 FL (ref 6–12)
POTASSIUM BLD-SCNC: 4.3 MMOL/L (ref 3.5–5.2)
PROT SERPL-MCNC: 6.9 G/DL (ref 6–8.5)
RBC # BLD AUTO: 4.84 10*6/MM3 (ref 3.77–5.28)
SODIUM BLD-SCNC: 141 MMOL/L (ref 136–145)
TROPONIN T SERPL-MCNC: <0.01 NG/ML (ref 0–0.03)
WBC NRBC COR # BLD: 7.34 10*3/MM3 (ref 3.4–10.8)

## 2020-01-02 PROCEDURE — 94640 AIRWAY INHALATION TREATMENT: CPT

## 2020-01-02 PROCEDURE — 85025 COMPLETE CBC W/AUTO DIFF WBC: CPT | Performed by: NURSE PRACTITIONER

## 2020-01-02 PROCEDURE — 80053 COMPREHEN METABOLIC PANEL: CPT | Performed by: NURSE PRACTITIONER

## 2020-01-02 PROCEDURE — 93005 ELECTROCARDIOGRAM TRACING: CPT | Performed by: EMERGENCY MEDICINE

## 2020-01-02 PROCEDURE — 83880 ASSAY OF NATRIURETIC PEPTIDE: CPT | Performed by: NURSE PRACTITIONER

## 2020-01-02 PROCEDURE — 99283 EMERGENCY DEPT VISIT LOW MDM: CPT

## 2020-01-02 PROCEDURE — 71046 X-RAY EXAM CHEST 2 VIEWS: CPT

## 2020-01-02 PROCEDURE — 84484 ASSAY OF TROPONIN QUANT: CPT | Performed by: NURSE PRACTITIONER

## 2020-01-02 RX ORDER — AZITHROMYCIN 250 MG/1
TABLET, FILM COATED ORAL
Qty: 6 TABLET | Refills: 0 | Status: SHIPPED | OUTPATIENT
Start: 2020-01-02 | End: 2022-12-13

## 2020-01-02 RX ORDER — ALBUTEROL SULFATE 90 UG/1
2 AEROSOL, METERED RESPIRATORY (INHALATION) EVERY 4 HOURS PRN
Qty: 1 INHALER | Refills: 0 | Status: SHIPPED | OUTPATIENT
Start: 2020-01-02 | End: 2022-12-13

## 2020-01-02 RX ORDER — CHLORAL HYDRATE 500 MG
CAPSULE ORAL DAILY
COMMUNITY
End: 2022-12-13

## 2020-01-02 RX ORDER — PREDNISONE 20 MG/1
40 TABLET ORAL DAILY
Qty: 8 TABLET | Refills: 0 | Status: SHIPPED | OUTPATIENT
Start: 2020-01-02 | End: 2022-12-13

## 2020-01-02 RX ORDER — ALBUTEROL SULFATE 2.5 MG/3ML
2.5 SOLUTION RESPIRATORY (INHALATION) ONCE
Status: COMPLETED | OUTPATIENT
Start: 2020-01-02 | End: 2020-01-02

## 2020-01-02 RX ADMIN — HYDROCODONE POLISTIREX AND CHLORPHENIRAMINE POLISTIREX 5 ML: 10; 8 SUSPENSION, EXTENDED RELEASE ORAL at 12:04

## 2020-01-02 RX ADMIN — ALBUTEROL SULFATE 2.5 MG: 2.5 SOLUTION RESPIRATORY (INHALATION) at 13:01
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
## 2020-07-06 ENCOUNTER — TRANSCRIBE ORDERS (OUTPATIENT)
Dept: ADMINISTRATIVE | Facility: HOSPITAL | Age: 65
End: 2020-07-06

## 2020-07-06 DIAGNOSIS — Z12.31 VISIT FOR SCREENING MAMMOGRAM: Primary | ICD-10-CM

## 2020-09-01 ENCOUNTER — HOSPITAL ENCOUNTER (OUTPATIENT)
Dept: MAMMOGRAPHY | Facility: HOSPITAL | Age: 65
Discharge: HOME | End: 2020-09-01
Admitting: FAMILY MEDICINE

## 2020-09-01 DIAGNOSIS — Z12.31 VISIT FOR SCREENING MAMMOGRAM: ICD-10-CM

## 2020-09-01 PROCEDURE — 77063 BREAST TOMOSYNTHESIS BI: CPT

## 2020-09-01 PROCEDURE — 77063 BREAST TOMOSYNTHESIS BI: CPT | Performed by: RADIOLOGY

## 2020-09-01 PROCEDURE — 77067 SCR MAMMO BI INCL CAD: CPT | Performed by: RADIOLOGY

## 2020-09-01 PROCEDURE — 77067 SCR MAMMO BI INCL CAD: CPT

## 2020-09-24 ENCOUNTER — APPOINTMENT (OUTPATIENT)
Dept: MAMMOGRAPHY | Facility: HOSPITAL | Age: 65
End: 2020-09-24

## 2020-10-07 ENCOUNTER — HOSPITAL ENCOUNTER (EMERGENCY)
Facility: HOSPITAL | Age: 65
Discharge: HOME | End: 2020-10-07
Attending: EMERGENCY MEDICINE | Admitting: EMERGENCY MEDICINE

## 2020-10-07 ENCOUNTER — APPOINTMENT (OUTPATIENT)
Dept: CT IMAGING | Facility: HOSPITAL | Age: 65
End: 2020-10-07

## 2020-10-07 VITALS
DIASTOLIC BLOOD PRESSURE: 94 MMHG | RESPIRATION RATE: 16 BRPM | SYSTOLIC BLOOD PRESSURE: 168 MMHG | BODY MASS INDEX: 31.34 KG/M2 | OXYGEN SATURATION: 98 % | TEMPERATURE: 97.7 F | WEIGHT: 195 LBS | HEART RATE: 95 BPM | HEIGHT: 66 IN

## 2020-10-07 DIAGNOSIS — F07.81 POST CONCUSSION SYNDROME: ICD-10-CM

## 2020-10-07 DIAGNOSIS — S06.9X9A HEAD INJURY, CLOSED, WITH LOC OF UNKNOWN DURATION (HCC): Primary | ICD-10-CM

## 2020-10-07 DIAGNOSIS — S01.01XA LACERATION OF SCALP, INITIAL ENCOUNTER: ICD-10-CM

## 2020-10-07 PROCEDURE — 90715 TDAP VACCINE 7 YRS/> IM: CPT | Performed by: PHYSICIAN ASSISTANT

## 2020-10-07 PROCEDURE — 70450 CT HEAD/BRAIN W/O DYE: CPT

## 2020-10-07 PROCEDURE — 72125 CT NECK SPINE W/O DYE: CPT

## 2020-10-07 PROCEDURE — 90471 IMMUNIZATION ADMIN: CPT | Performed by: PHYSICIAN ASSISTANT

## 2020-10-07 PROCEDURE — 99284 EMERGENCY DEPT VISIT MOD MDM: CPT

## 2020-10-07 PROCEDURE — 99283 EMERGENCY DEPT VISIT LOW MDM: CPT

## 2020-10-07 PROCEDURE — 25010000002 TDAP 5-2.5-18.5 LF-MCG/0.5 SUSPENSION: Performed by: PHYSICIAN ASSISTANT

## 2020-10-07 RX ORDER — BUTALBITAL, ACETAMINOPHEN AND CAFFEINE 50; 325; 40 MG/1; MG/1; MG/1
1 TABLET ORAL EVERY 4 HOURS PRN
Status: DISCONTINUED | OUTPATIENT
Start: 2020-10-07 | End: 2020-10-07 | Stop reason: HOSPADM

## 2020-10-07 RX ORDER — ONDANSETRON 4 MG/1
4 TABLET, FILM COATED ORAL EVERY 8 HOURS PRN
Qty: 20 TABLET | Refills: 0 | Status: SHIPPED | OUTPATIENT
Start: 2020-10-07 | End: 2022-12-13

## 2020-10-07 RX ORDER — BUTALBITAL, ACETAMINOPHEN AND CAFFEINE 50; 325; 40 MG/1; MG/1; MG/1
1-2 TABLET ORAL EVERY 6 HOURS PRN
Qty: 15 TABLET | Refills: 0 | Status: SHIPPED | OUTPATIENT
Start: 2020-10-07 | End: 2022-12-13

## 2020-10-07 RX ORDER — LIDOCAINE HYDROCHLORIDE AND EPINEPHRINE 10; 10 MG/ML; UG/ML
10 INJECTION, SOLUTION INFILTRATION; PERINEURAL ONCE
Status: COMPLETED | OUTPATIENT
Start: 2020-10-07 | End: 2020-10-07

## 2020-10-07 RX ORDER — CEPHALEXIN 500 MG/1
500 CAPSULE ORAL 2 TIMES DAILY
Qty: 10 CAPSULE | Refills: 0 | Status: SHIPPED | OUTPATIENT
Start: 2020-10-07 | End: 2022-12-13

## 2020-10-07 RX ADMIN — LIDOCAINE HYDROCHLORIDE,EPINEPHRINE BITARTRATE 10 ML: 10; .01 INJECTION, SOLUTION INFILTRATION; PERINEURAL at 20:29

## 2020-10-07 RX ADMIN — TETANUS TOXOID, REDUCED DIPHTHERIA TOXOID AND ACELLULAR PERTUSSIS VACCINE, ADSORBED 0.5 ML: 5; 2.5; 8; 8; 2.5 SUSPENSION INTRAMUSCULAR at 20:29

## 2020-10-07 RX ADMIN — BUTALBITAL, ACETAMINOPHEN, AND CAFFEINE 1 TABLET: 50; 325; 40 TABLET ORAL at 20:20

## 2020-10-07 NOTE — ED PROVIDER NOTES
EMERGENCY DEPARTMENT ENCOUNTER    Room Number:  02/02  Date of encounter:  10/7/2020  PCP: Dulce Maria Gonzalez DO  Historian: Patient      HPI:  Chief Complaint: Fall from horse, head injury, scalp laceration, loss of consciousness.    A complete HPI/ROS/PMH/PSH/SH/FH are unobtainable due to: NA    Context: Stephani Paulson is a 64 y.o. female who presents to the ED c/o of fall from her horse today around 2:30 PM patient states her horse was spooked, she was thrown from the horse, landing on her buttocks, then on the back of her head on a gravel road.  Her  was present and states she had a loss of consciousness for approximately 5 to 10 minutes is followed by temporary amnesia of the situation, confusion that cleared over the next 30 minutes.  She complains of some diffuse neck pain and stiffness, no upper mid or lower back pain.  Generalized throbbing headache with no vision changes or photophobia.  No nausea or vomiting.  She cannot recall her last tetanus immunization.  She has a scalp laceration to the occipital area.  He denies other symptoms or injuries at this time.      PAST MEDICAL HISTORY  Active Ambulatory Problems     Diagnosis Date Noted   • No Active Ambulatory Problems     Resolved Ambulatory Problems     Diagnosis Date Noted   • No Resolved Ambulatory Problems     Past Medical History:   Diagnosis Date   • Breast injury 03/2017         PAST SURGICAL HISTORY  Past Surgical History:   Procedure Laterality Date   • AUGMENTATION MAMMAPLASTY Bilateral 01/2017   • BREAST EXCISIONAL BIOPSY Left 1980   • HYSTERECTOMY  2004   • OOPHORECTOMY Bilateral 2004   • REDUCTION MAMMAPLASTY Bilateral 2013    WITH MASTOPEXY         FAMILY HISTORY  Family History   Problem Relation Age of Onset   • Ovarian cancer Neg Hx    • Breast cancer Neg Hx          SOCIAL HISTORY  Social History     Socioeconomic History   • Marital status:      Spouse name: Not on file   • Number of children: Not on file    • Years of education: Not on file   • Highest education level: Not on file   Tobacco Use   • Smoking status: Never Smoker   Substance and Sexual Activity   • Alcohol use: No   • Drug use: No         ALLERGIES  Patient has no known allergies.        REVIEW OF SYSTEMS  Review of Systems     All systems reviewed and negative except for those discussed in HPI.       PHYSICAL EXAM    I have reviewed the triage vital signs and nursing notes.    ED Triage Vitals [10/07/20 1637]   Temp Heart Rate Resp BP SpO2   97.7 °F (36.5 °C) 111 18 (!) 170/104 98 %      Temp src Heart Rate Source Patient Position BP Location FiO2 (%)   -- Monitor Sitting Left arm --       Physical Exam  GENERAL: Does not awake alert and oriented, not toxic appearing, hemodynamically stable.  Well developed.  Appears in no acute distress.  HENT: Nares patent.  No facial asymmetry.  She has a 3 cm partial-thickness laceration to the occipital scalp.  No bony tenderness crepitus or step-off.  Bleeding controlled at time of exam.  EYES: No scleral icterus sclera and conjunctive are clear.  Visual acuity is grossly intact.  EOMI PERRL  CV: Regular rhythm, regular rate, no murmurs rubs or gallops.  RESPIRATORY: Normal effort.  No audible wheezes, rales or rhonchi  ABDOMEN: Soft, nontender  MUSCULOSKELETAL: No deformities.   NEURO: Alert, moves all extremities, follows commands cranial nerves grossly intact, no gross sensorimotor deficits.  Proprioception is normal.  SKIN: Warm, dry, no rash visualized.  3 cm partial-thickness laceration to occipital scalp.        LAB RESULTS  No results found for this or any previous visit (from the past 24 hour(s)).    Ordered the above labs and independently reviewed the results.        RADIOLOGY  Ct Head Without Contrast    Result Date: 10/7/2020  EXAMINATION: CT HEAD WO CONTRAST-, CT CERVICAL SPINE WO CONTRAST-  INDICATION: THROWN FROM HORSE, HEAD INJURY, +LOC  TECHNIQUE: Axial noncontrast CT of the head and cervical  spine with multiplanar reconstructions.  The radiation dose reduction device was turned on for each scan per the ALARA (As Low as Reasonably Achievable) protocol.  COMPARISON: NONE  FINDINGS: CT head: Gray-white differentiation is maintained and there is no evidence of intracranial hemorrhage, mass or mass effect. The ventricles are normal in size and configuration. No extra-axial hemorrhage. The orbits are normal and the paranasal sinuses are grossly clear. The calvarium is intact. Left occipital/parietal scalp hematoma noted.  CT C-spine: Vertebral body heights are maintained and alignment is anatomic, without evidence of acute fracture or subluxation. The paraspinal soft tissues are unremarkable. Moderate multilevel cervical spondylosis changes are noted and appear most significant at C5-6 and C6-7.      No acute intracranial abnormality. Left parietal scalp hematoma/laceration noted.  No acute fracture or subluxation of the cervical spine. Multilevel spondylosis changes are noted which appear most significant at C5-6 and C6-7.   This report was finalized on 10/7/2020 5:16 PM by Aníbal Perez.      Ct Cervical Spine Without Contrast    Result Date: 10/7/2020  EXAMINATION: CT HEAD WO CONTRAST-, CT CERVICAL SPINE WO CONTRAST-  INDICATION: THROWN FROM HORSE, HEAD INJURY, +LOC  TECHNIQUE: Axial noncontrast CT of the head and cervical spine with multiplanar reconstructions.  The radiation dose reduction device was turned on for each scan per the ALARA (As Low as Reasonably Achievable) protocol.  COMPARISON: NONE  FINDINGS: CT head: Gray-white differentiation is maintained and there is no evidence of intracranial hemorrhage, mass or mass effect. The ventricles are normal in size and configuration. No extra-axial hemorrhage. The orbits are normal and the paranasal sinuses are grossly clear. The calvarium is intact. Left occipital/parietal scalp hematoma noted.  CT C-spine: Vertebral body heights are maintained and  alignment is anatomic, without evidence of acute fracture or subluxation. The paraspinal soft tissues are unremarkable. Moderate multilevel cervical spondylosis changes are noted and appear most significant at C5-6 and C6-7.      No acute intracranial abnormality. Left parietal scalp hematoma/laceration noted.  No acute fracture or subluxation of the cervical spine. Multilevel spondylosis changes are noted which appear most significant at C5-6 and C6-7.   This report was finalized on 10/7/2020 5:16 PM by Aníbal Perez.              PROCEDURES    Laceration Repair    Date/Time: 10/7/2020 11:34 PM  Performed by: Bismark Dowd PA-C  Authorized by: Jose Osborne MD     Consent:     Consent obtained:  Verbal    Consent given by:  Patient    Risks discussed:  Infection  Anesthesia (see MAR for exact dosages):     Anesthesia method:  Local infiltration    Local anesthetic:  Lidocaine 1% WITH epi  Laceration details:     Location:  Scalp    Scalp location:  Occipital    Length (cm):  3    Depth (mm):  4  Repair type:     Repair type:  Simple  Pre-procedure details:     Preparation:  Patient was prepped and draped in usual sterile fashion  Exploration:     Hemostasis achieved with:  Epinephrine    Wound exploration: entire depth of wound probed and visualized      Wound extent: no foreign bodies/material noted    Treatment:     Area cleansed with:  Betadine    Amount of cleaning:  Standard    Irrigation solution:  Sterile saline    Visualized foreign bodies/material removed: no    Skin repair:     Repair method:  Staples    Number of staples:  4  Approximation:     Approximation:  Close  Post-procedure details:     Dressing:  Open (no dressing)    Patient tolerance of procedure:  Tolerated well, no immediate complications          MEDICATIONS GIVEN IN ER    Medications   butalbital-acetaminophen-caffeine (FIORICET, ESGIC) -40 MG per tablet 1 tablet (1 tablet Oral Given 10/7/20 2020)   Tdap (BOOSTRIX)  injection 0.5 mL (0.5 mL Intramuscular Given 10/7/20 2029)   lidocaine-EPINEPHrine (XYLOCAINE W/EPI) 1 %-1:[id number] injection 10 mL (10 mL Injection Given 10/7/20 2029)         PROGRESS, DATA ANALYSIS, CONSULTS, AND MEDICAL DECISION MAKING    All labs have been independently reviewed by me.  All radiology studies have been reviewed by me and the radiologist dictating the report.   EKG's have been independently viewed and interpreted by me.                 Patient remained stable throughout course of stay in emergency department.  Immunization updated.  Will discharge with a short-term prescription of Fioricet for headache.  Zofran for nausea and Keflex 500 mg twice daily x5 days.  Wound recheck in 2 days, staple removal in 7 to 10 days.  Signs and symptoms of worsening head injury reviewed with patient and her .  Advised to have a low threshold to return if any change or worsening of symptoms.  They verbalized understanding and are agreeable to plan.    AS OF 23:36 EDT VITALS:    BP - 168/94  HR - 95  TEMP - 97.7 °F (36.5 °C)  O2 SATS - 98%        DIAGNOSIS  Final diagnoses:   Head injury, closed, with LOC of unknown duration (CMS/HCC)   Post concussion syndrome   Laceration of scalp, initial encounter         DISPOSITION  DISCHARGE    Patient discharged in stable condition.    Reviewed implications of results, diagnosis, meds, responsibility to follow up, warning signs and symptoms of possible worsening, potential complications and reasons to return to ER.    Patient/Family voiced understanding of above instructions.    Discussed plan for discharge, as there is no emergent indication for admission.  Pt/family is agreeable and understands need for follow up and possible repeat testing.  Pt/family is aware that discharge does not mean that nothing is wrong but that it indicates no emergency is currently present that requires admission and they must continue care with follow-up as given below or with a physician  of their choice.     FOLLOW-UP  Dulce Maria Gonzalez DO  [address]  [address]  [place] [id number] [phone]    Schedule an appointment as soon as possible for a visit   As needed         Medication List      New Prescriptions    butalbital-acetaminophen-caffeine -40 MG per tablet  Commonly known as: FIORICET, ESGIC  Take 1-2 tablets by mouth Every 6 (Six) Hours As Needed for Headache.     cephalexin 500 MG capsule  Commonly known as: KEFLEX  Take 1 capsule by mouth 2 (Two) Times a Day.     ondansetron 4 MG tablet  Commonly known as: Zofran  Take 1 tablet by mouth Every 8 (Eight) Hours As Needed for Nausea or Vomiting.           Where to Get Your Medications      You can get these medications from any pharmacy    Bring a paper prescription for each of these medications  · butalbital-acetaminophen-caffeine -40 MG per tablet  · cephalexin 500 MG capsule  · ondansetron 4 MG tablet                  Bismark Dowd PA-C  10/07/20 [id number]

## 2020-10-08 NOTE — DISCHARGE INSTRUCTIONS
Keep wound clean and dry.  Recheck in 2 days, staple removal in 7 to 10 days.  Observe for any sign of worsening head injury.  Return to the emergency department immediately for any change or worsening of symptoms.

## 2021-07-26 ENCOUNTER — TRANSCRIBE ORDERS (OUTPATIENT)
Dept: ADMINISTRATIVE | Facility: HOSPITAL | Age: 66
End: 2021-07-26

## 2021-07-26 DIAGNOSIS — Z12.31 VISIT FOR SCREENING MAMMOGRAM: Primary | ICD-10-CM

## 2021-09-07 ENCOUNTER — HOSPITAL ENCOUNTER (OUTPATIENT)
Dept: MAMMOGRAPHY | Facility: HOSPITAL | Age: 66
Discharge: HOME | End: 2021-09-07
Admitting: FAMILY MEDICINE

## 2021-09-07 DIAGNOSIS — Z12.31 VISIT FOR SCREENING MAMMOGRAM: ICD-10-CM

## 2021-09-07 PROCEDURE — 77063 BREAST TOMOSYNTHESIS BI: CPT | Performed by: RADIOLOGY

## 2021-09-07 PROCEDURE — 77067 SCR MAMMO BI INCL CAD: CPT

## 2021-09-07 PROCEDURE — 77067 SCR MAMMO BI INCL CAD: CPT | Performed by: RADIOLOGY

## 2021-09-07 PROCEDURE — 77063 BREAST TOMOSYNTHESIS BI: CPT

## 2022-07-28 ENCOUNTER — TRANSCRIBE ORDERS (OUTPATIENT)
Dept: ADMINISTRATIVE | Facility: HOSPITAL | Age: 67
End: 2022-07-28

## 2022-07-28 DIAGNOSIS — Z12.31 VISIT FOR SCREENING MAMMOGRAM: Primary | ICD-10-CM

## 2022-09-27 ENCOUNTER — HOSPITAL ENCOUNTER (OUTPATIENT)
Dept: MAMMOGRAPHY | Facility: HOSPITAL | Age: 67
Discharge: HOME | End: 2022-09-27
Admitting: FAMILY MEDICINE

## 2022-09-27 DIAGNOSIS — Z12.31 VISIT FOR SCREENING MAMMOGRAM: ICD-10-CM

## 2022-09-27 PROCEDURE — 77067 SCR MAMMO BI INCL CAD: CPT | Performed by: RADIOLOGY

## 2022-09-27 PROCEDURE — 77063 BREAST TOMOSYNTHESIS BI: CPT | Performed by: RADIOLOGY

## 2022-09-27 PROCEDURE — 77063 BREAST TOMOSYNTHESIS BI: CPT

## 2022-09-27 PROCEDURE — 77067 SCR MAMMO BI INCL CAD: CPT

## 2022-12-13 ENCOUNTER — OFFICE VISIT (OUTPATIENT)
Dept: INTERNAL MEDICINE | Facility: CLINIC | Age: 67
End: 2022-12-13

## 2022-12-13 VITALS
HEIGHT: 64 IN | HEART RATE: 80 BPM | DIASTOLIC BLOOD PRESSURE: 78 MMHG | BODY MASS INDEX: 31.99 KG/M2 | OXYGEN SATURATION: 99 % | TEMPERATURE: 98.4 F | WEIGHT: 187.4 LBS | SYSTOLIC BLOOD PRESSURE: 130 MMHG

## 2022-12-13 DIAGNOSIS — M25.562 CHRONIC PAIN OF LEFT KNEE: ICD-10-CM

## 2022-12-13 DIAGNOSIS — Z83.79 FAMILY HISTORY OF FATTY LIVER: Chronic | ICD-10-CM

## 2022-12-13 DIAGNOSIS — E66.09 CLASS 1 OBESITY DUE TO EXCESS CALORIES WITH SERIOUS COMORBIDITY AND BODY MASS INDEX (BMI) OF 31.0 TO 31.9 IN ADULT: Chronic | ICD-10-CM

## 2022-12-13 DIAGNOSIS — G89.29 CHRONIC PAIN OF LEFT KNEE: ICD-10-CM

## 2022-12-13 DIAGNOSIS — L40.9 PSORIASIS OF SCALP: Chronic | ICD-10-CM

## 2022-12-13 DIAGNOSIS — J30.2 PERENNIAL ALLERGIC RHINITIS WITH SEASONAL VARIATION: Chronic | ICD-10-CM

## 2022-12-13 DIAGNOSIS — Z78.9 STATIN INTOLERANCE: Chronic | ICD-10-CM

## 2022-12-13 DIAGNOSIS — J30.89 PERENNIAL ALLERGIC RHINITIS WITH SEASONAL VARIATION: Chronic | ICD-10-CM

## 2022-12-13 DIAGNOSIS — B00.1 HERPES LABIALIS WITHOUT COMPLICATION: Chronic | ICD-10-CM

## 2022-12-13 DIAGNOSIS — E78.2 MIXED HYPERLIPIDEMIA: Primary | Chronic | ICD-10-CM

## 2022-12-13 PROBLEM — E66.811 CLASS 1 OBESITY DUE TO EXCESS CALORIES WITH SERIOUS COMORBIDITY AND BODY MASS INDEX (BMI) OF 31.0 TO 31.9 IN ADULT: Chronic | Status: ACTIVE | Noted: 2022-12-13

## 2022-12-13 PROCEDURE — 99205 OFFICE O/P NEW HI 60 MIN: CPT | Performed by: INTERNAL MEDICINE

## 2022-12-13 RX ORDER — VALACYCLOVIR HYDROCHLORIDE 500 MG/1
500 TABLET, FILM COATED ORAL DAILY
COMMUNITY
Start: 2022-08-15

## 2022-12-13 RX ORDER — FLUOCINONIDE TOPICAL SOLUTION USP, 0.05% 0.5 MG/ML
SOLUTION TOPICAL
COMMUNITY

## 2022-12-13 RX ORDER — TIRZEPATIDE 5 MG/.5ML
5 INJECTION, SOLUTION SUBCUTANEOUS
COMMUNITY

## 2022-12-13 RX ORDER — LEVOTHYROXINE AND LIOTHYRONINE 19; 4.5 UG/1; UG/1
TABLET ORAL
COMMUNITY
End: 2022-12-13

## 2022-12-13 RX ORDER — ESTRADIOL 0.05 MG/D
1 FILM, EXTENDED RELEASE TRANSDERMAL 2 TIMES WEEKLY
COMMUNITY
Start: 2022-10-12

## 2022-12-13 RX ORDER — ROSUVASTATIN CALCIUM 5 MG/1
5 TABLET, COATED ORAL NIGHTLY
Qty: 90 TABLET | Refills: 1 | Status: SHIPPED | OUTPATIENT
Start: 2022-12-13 | End: 2023-02-28 | Stop reason: SDUPTHER

## 2022-12-13 RX ORDER — LORATADINE 10 MG/1
10 TABLET ORAL DAILY
COMMUNITY
Start: 2022-10-09

## 2022-12-13 RX ORDER — TRIAMCINOLONE ACETONIDE 55 UG/1
2 SPRAY, METERED NASAL DAILY
COMMUNITY
End: 2022-12-13

## 2022-12-13 RX ORDER — CLOBETASOL PROPIONATE 0.46 MG/ML
SOLUTION TOPICAL
COMMUNITY

## 2022-12-13 NOTE — PATIENT INSTRUCTIONS
Patient Instructions   Problem List Items Addressed This Visit          Allergies and Adverse Reactions    Statin intolerance (Chronic)    Overview     Patient recalls taking atorvastatin many years ago and having muscle aching.  She does not recall trying any other statins.         Perennial allergic rhinitis with seasonal variation (Chronic)    Current Assessment & Plan     - Continue Nasacort nasal spray every day.            Cardiac and Vasculature    Mixed hyperlipidemia - Primary (Chronic)    Overview     Her latest LDL measure and 2022 was 183 with predominance of small LDL at 1513.  She has low HDL.  She has a strong family history of hyperlipidemia.  She has a brother who  of MI at age 65.  Several aunts and uncles on both parents side had heart disease.         Current Assessment & Plan     - Her latest LDL measure in 2022 was 183 mg/dL with predominance of small LDL at 1513 nmol/L and she has low HDL. There is a strong family history of hyperlipidemia and she has a brother who  of MI at age 65. Several aunts and uncles on both parent's side had heart disease.   - We will start a small dose of rosuvastatin 5 mg tablet. She is advised to take 1 tablet once per week in the evening. She may take it with the evening meal or anytime between there and bedtime.   - After about 1 month if she is tolerating the rosuvastatin, she will increase it to 2 tablets per week on Monday and Thursday. After another month if she is tolerating that, she will increase to 3 nights per week and gradually try to build up to every night.   - Begin CoQ10 400 mg capsule every day to prevent statin side effects.  - She will let us know if she is having trouble increasing her dose.  - Continue to decrease the fats and sugars in her diet.   - Continue to increase exercise.   - Weigh once per week at home to monitor progress on weight loss.          Relevant Medications    rosuvastatin (CRESTOR) 5 MG tablet    Other  Relevant Orders    Comprehensive Metabolic Panel    Lipid Panel       Endocrine and Metabolic    Class 1 obesity due to excess calories with serious comorbidity and body mass index (BMI) of 31.0 to 31.9 in adult (Chronic)    Current Assessment & Plan     - See plan for hyperlipidemia above.            Family History    Family history of fatty liver (Chronic)    Overview     Family history of fatty liver in her mother, father, brother, and 2 sisters.         Current Assessment & Plan     - We plan to do an ultrasound with elastography after she returns to [place] in the spring.            Infectious Diseases    Herpes labialis without complication (Chronic)    Current Assessment & Plan     - Continue to use valacyclovir daily as needed.         Relevant Medications    valACYclovir (VALTREX) 500 MG tablet       Musculoskeletal and Injuries    Chronic pain of left knee    Overview     MRI of the left knee done 9/21/2021 showed a chronic tear of the ACL with moderately advanced medial compartment osteoarthritis.  Degenerative maceration and complex tearing and extrusion of the medial meniscus.  Also degenerative tearing of the anterior horn of the lateral meniscus.  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  Popliteal tendon sheath ganglion cyst.  Degenerative interstitial tearing at the origin of the medial head of the gastrocnemius.  Taking aleve as needed.         Current Assessment & Plan     - We will refer her to Dr. Lipscomb for an appointment in 04/2023, when she returns to [place] from [place].   - Advised to use a cold pack on the knee after exercise and for pain, swelling, or inflammation.   - May take Aleve as needed. Advised to take Aleve with food to protect the stomach.  - May also take Tylenol as needed and use Voltaren gel as needed.          Relevant Orders    Ambulatory Referral to Orthopedic Surgery       Skin    Psoriasis of scalp (Chronic)     Current Assessment & Plan     - Encouraged to use Neutrogena T gel shampoo or Selsun blue at least three times per week.   - May use other shampoos at other times during the week.         Relevant Medications    clobetasol (TEMOVATE) 0.05 % external solution    fluocinonide (LIDEX) 0.05 % external solution     BMI for Adults  What is BMI?  Body mass index (BMI) is a number that is calculated from a person's weight and height. BMI can help estimate how much of a person's weight is composed of fat. BMI does not measure body fat directly. Rather, it is an alternative to procedures that directly measure body fat, which can be difficult and expensive.  BMI can help identify people who may be at higher risk for certain medical problems.  What are BMI measurements used for?  BMI is used as a screening tool to identify possible weight problems. It helps determine whether a person is obese, overweight, a healthy weight, or underweight.  BMI is useful for:  Identifying a weight problem that may be related to a medical condition or may increase the risk for medical problems.  Promoting changes, such as changes in diet and exercise, to help reach a healthy weight. BMI screening can be repeated to see if these changes are working.  How is BMI calculated?  BMI involves measuring your weight in relation to your height. Both height and weight are measured, and the BMI is calculated from those numbers. This can be done either in English ([place] or metric measurements. Note that charts and online BMI calculators are available to help you find your BMI quickly and easily without having to do these calculations yourself.  To calculate your BMI in English ([place] measurements:    Measure your weight in pounds (lb).  Multiply the number of pounds by 703.  For example, for a person who weighs 180 lb, multiply that number by 703, which equals [phone].  Measure your height in inches. Then multiply that number by itself to get a measurement  "called \"inches squared.\"  For example, for a person who is 70 inches tall, the \"inches squared\" measurement is 70 inches x 70 inches, which equals 4,900 inches squared.  Divide the total from step 2 (number of lb x 703) by the total from step 3 (inches squared): 126,540 ÷ 4,900 = 25.8. This is your BMI.  To calculate your BMI in metric measurements:  Measure your weight in kilograms (kg).  Measure your height in meters (m). Then multiply that number by itself to get a measurement called \"meters squared.\"  For example, for a person who is 1.75 m tall, the \"meters squared\" measurement is 1.75 m x 1.75 m, which is equal to 3.1 meters squared.  Divide the number of kilograms (your weight) by the meters squared number. In this example: 70 ÷ 3.1 = 22.6. This is your BMI.  What do the results mean?  BMI charts are used to identify whether you are underweight, normal weight, overweight, or obese. The following guidelines will be used:  Underweight: BMI less than 18.5.  Normal weight: BMI between 18.5 and 24.9.  Overweight: BMI between 25 and 29.9.  Obese: BMI of 30 or above.  Keep these notes in mind:  Weight includes both fat and muscle, so someone with a muscular build, such as an athlete, may have a BMI that is higher than 24.9. In cases like these, BMI is not an accurate measure of body fat.  To determine if excess body fat is the cause of a BMI of 25 or higher, further assessments may need to be done by a health care provider.  BMI is usually interpreted in the same way for men and women.  Where to find more information  For more information about BMI, including tools to quickly calculate your BMI, go to these websites:  Centers for Disease Control and Prevention: www.cdc.gov  American Heart Association: www.heart.org  National Heart, Lung, and [employer] [place]: www.nhlbi.nih.gov  Summary  Body mass index (BMI) is a number that is calculated from a person's weight and height.  BMI may help estimate how much of a " person's weight is composed of fat. BMI can help identify those who may be at higher risk for certain medical problems.  BMI can be measured using English measurements or metric measurements.  BMI charts are used to identify whether you are underweight, normal weight, overweight, or obese.  This information is not intended to replace advice given to you by your health care provider. Make sure you discuss any questions you have with your health care provider.  Document Revised: 09/09/2020 Document Reviewed: 07/17/2020  Elsevier Patient Education © 2022 [employer]  Calorie Counting for Weight Loss  Calories are units of energy. Your body needs a certain number of calories from food to keep going throughout the day. When you eat or drink more calories than your body needs, your body stores the extra calories mostly as fat. When you eat or drink fewer calories than your body needs, your body burns fat to get the energy it needs.  Calorie counting means keeping track of how many calories you eat and drink each day. Calorie counting can be helpful if you need to lose weight. If you eat fewer calories than your body needs, you should lose weight. Ask your health care provider what a healthy weight is for you.  For calorie counting to work, you will need to eat the right number of calories each day to lose a healthy amount of weight per week. A dietitian can help you figure out how many calories you need in a day and will suggest ways to reach your calorie goal.  A healthy amount of weight to lose each week is usually 1-2 lb (0.5-0.9 kg). This usually means that your daily calorie intake should be reduced by 500-750 calories.  Eating 1,200-1,500 calories a day can help most women lose weight.  Eating 1,500-1,800 calories a day can help most men lose weight.  What do I need to know about calorie counting?  Work with your health care provider or dietitian to determine how many calories you should get each day. To meet your  daily calorie goal, you will need to:  Find out how many calories are in each food that you would like to eat. Try to do this before you eat.  Decide how much of the food you plan to eat.  Keep a food log. Do this by writing down what you ate and how many calories it had.  To successfully lose weight, it is important to balance calorie counting with a healthy lifestyle that includes regular activity.  Where do I find calorie information?  The number of calories in a food can be found on a Nutrition Facts label. If a food does not have a Nutrition Facts label, try to look up the calories online or ask your dietitian for help.  Remember that calories are listed per serving. If you choose to have more than one serving of a food, you will have to multiply the calories per serving by the number of servings you plan to eat. For example, the label on a package of bread might say that a serving size is 1 slice and that there are 90 calories in a serving. If you eat 1 slice, you will have eaten 90 calories. If you eat 2 slices, you will have eaten 180 calories.  How do I keep a food log?  After each time that you eat, record the following in your food log as soon as possible:  What you ate. Be sure to include toppings, sauces, and other extras on the food.  How much you ate. This can be measured in cups, ounces, or number of items.  How many calories were in each food and drink.  The total number of calories in the food you ate.  Keep your food log near you, such as in a pocket-sized notebook or on an margaret or website on your mobile phone. Some programs will calculate calories for you and show you how many calories you have left to meet your daily goal.  What are some portion-control tips?  Know how many calories are in a serving. This will help you know how many servings you can have of a certain food.  Use a measuring cup to measure serving sizes. You could also try weighing out portions on a kitchen scale. With time, you  will be able to estimate serving sizes for some foods.  Take time to put servings of different foods on your favorite plates or in your favorite bowls and cups so you know what a serving looks like.  Try not to eat straight from a food's packaging, such as from a bag or box. Eating straight from the package makes it hard to see how much you are eating and can lead to overeating. Put the amount you would like to eat in a cup or on a plate to make sure you are eating the right portion.  Use smaller plates, glasses, and bowls for smaller portions and to prevent overeating.  Try not to multitask. For example, avoid watching TV or using your computer while eating. If it is time to eat, sit down at a table and enjoy your food. This will help you recognize when you are full. It will also help you be more mindful of what and how much you are eating.  What are tips for following this plan?  Reading food labels  Check the calorie count compared with the serving size. The serving size may be smaller than what you are used to eating.  Check the source of the calories. Try to choose foods that are high in protein, fiber, and vitamins, and low in saturated fat, trans fat, and sodium.  Shopping  Read nutrition labels while you shop. This will help you make healthy decisions about which foods to buy.  Pay attention to nutrition labels for low-fat or fat-free foods. These foods sometimes have the same number of calories or more calories than the full-fat versions. They also often have added sugar, starch, or salt to make up for flavor that was removed with the fat.  Make a grocery list of lower-calorie foods and stick to it.  Cooking  Try to cook your favorite foods in a healthier way. For example, try baking instead of frying.  Use low-fat dairy products.  Meal planning  Use more fruits and vegetables. One-half of your plate should be fruits and vegetables.  Include lean proteins, such as chicken, turkey, and fish.  Lifestyle  Each  week, aim to do one of the followin minutes of moderate exercise, such as walking.  75 minutes of vigorous exercise, such as running.  General information  Know how many calories are in the foods you eat most often. This will help you calculate calorie counts faster.  Find a way of tracking calories that works for you. Get creative. Try different apps or programs if writing down calories does not work for you.  What foods should I eat?    Eat nutritious foods. It is better to have a nutritious, high-calorie food, such as an avocado, than a food with few nutrients, such as a bag of potato chips.  Use your calories on foods and drinks that will fill you up and will not leave you hungry soon after eating.  Examples of foods that fill you up are nuts and nut butters, vegetables, lean proteins, and high-fiber foods such as whole grains. High-fiber foods are foods with more than 5 g of fiber per serving.  Pay attention to calories in drinks. Low-calorie drinks include water and unsweetened drinks.  The items listed above may not be a complete list of foods and beverages you can eat. Contact a dietitian for more information.  What foods should I limit?  Limit foods or drinks that are not good sources of vitamins, minerals, or protein or that are high in unhealthy fats. These include:  Candy.  Other sweets.  Sodas, specialty coffee drinks, alcohol, and juice.  The items listed above may not be a complete list of foods and beverages you should avoid. Contact a dietitian for more information.  How do I count calories when eating out?  Pay attention to portions. Often, portions are much larger when eating out. Try these tips to keep portions smaller:  Consider sharing a meal instead of getting your own.  If you get your own meal, eat only half of it. Before you start eating, ask for a container and put half of your meal into it.  When available, consider ordering smaller portions from the menu instead of full  portions.  Pay attention to your food and drink choices. Knowing the way food is cooked and what is included with the meal can help you eat fewer calories.  If calories are listed on the menu, choose the lower-calorie options.  Choose dishes that include vegetables, fruits, whole grains, low-fat dairy products, and lean proteins.  Choose items that are boiled, broiled, grilled, or steamed. Avoid items that are buttered, battered, fried, or served with cream sauce. Items labeled as crispy are usually fried, unless stated otherwise.  Choose water, low-fat milk, unsweetened iced tea, or other drinks without added sugar. If you want an alcoholic beverage, choose a lower-calorie option, such as a glass of wine or light beer.  Ask for dressings, sauces, and syrups on the side. These are usually high in calories, so you should limit the amount you eat.  If you want a salad, choose a garden salad and ask for grilled meats. Avoid extra toppings such as buckner, cheese, or fried items. Ask for the dressing on the side, or ask for olive oil and vinegar or lemon to use as dressing.  Estimate how many servings of a food you are given. Knowing serving sizes will help you be aware of how much food you are eating at restaurants.  Where to find more information  Centers for Disease Control and Prevention: www.cdc.gov  [place] Department of Agriculture: myplate.gov  Summary  Calorie counting means keeping track of how many calories you eat and drink each day. If you eat fewer calories than your body needs, you should lose weight.  A healthy amount of weight to lose per week is usually 1-2 lb (0.5-0.9 kg). This usually means reducing your daily calorie intake by 500-750 calories.  The number of calories in a food can be found on a Nutrition Facts label. If a food does not have a Nutrition Facts label, try to look up the calories online or ask your dietitian for help.  Use smaller plates, glasses, and bowls for smaller portions and to  prevent overeating.  Use your calories on foods and drinks that will fill you up and not leave you hungry shortly after a meal.  This information is not intended to replace advice given to you by your health care provider. Make sure you discuss any questions you have with your health care provider.  Document Revised: 01/28/2021 Document Reviewed: 01/28/2021  Elsevier Patient Education © 2022 [employer]

## 2022-12-13 NOTE — PROGRESS NOTES
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 Facts label. If a food does not have a Nutrition Facts label, try to look up the calories online or ask your dietitian for help.  Remember that calories are listed per serving. If you choose to have more than one serving of a food, you will have to multiply the calories per serving by the number of servings you plan to eat. For example, the label on a package of bread might say that a serving size is 1 slice and that there are 90 calories in a serving. If you eat 1 slice, you will have eaten 90 calories. If you eat 2 slices, you will have eaten 180 calories.  How do I keep a food log?  After each time that you eat, record the following in your food log as soon as possible:  • What you ate. Be sure to include toppings, sauces, and other extras on the food.  • How much you ate. This can be measured in cups, ounces, or number of items.  • How many calories were in each food and drink.  • The total number of calories in the food you ate.  Keep your food log near you, such as in a pocket-sized notebook or on an margaret or website on your mobile phone. Some programs will calculate calories for you and show you how many calories you have left to meet your daily goal.  What are some portion-control tips?  • Know how many calories are in a serving.  This will help you know how many servings you can have of a certain food.  • Use a measuring cup to measure serving sizes. You could also try weighing out portions on a kitchen scale. With time, you will be able to estimate serving sizes for some foods.  • Take time to put servings of different foods on your favorite plates or in your favorite bowls and cups so you know what a serving looks like.  • Try not to eat straight from a food's packaging, such as from a bag or box. Eating straight from the package makes it hard to see how much you are eating and can lead to overeating. Put the amount you would like to eat in a cup or on a plate to make sure you are eating the right portion.  • Use smaller plates, glasses, and bowls for smaller portions and to prevent overeating.  • Try not to multitask. For example, avoid watching TV or using your computer while eating. If it is time to eat, sit down at a table and enjoy your food. This will help you recognize when you are full. It will also help you be more mindful of what and how much you are eating.  What are tips for following this plan?  Reading food labels  • Check the calorie count compared with the serving size. The serving size may be smaller than what you are used to eating.  • Check the source of the calories. Try to choose foods that are high in protein, fiber, and vitamins, and low in saturated fat, trans fat, and sodium.  Shopping  • Read nutrition labels while you shop. This will help you make healthy decisions about which foods to buy.  • Pay attention to nutrition labels for low-fat or fat-free foods. These foods sometimes have the same number of calories or more calories than the full-fat versions. They also often have added sugar, starch, or salt to make up for flavor that was removed with the fat.  • Make a grocery list of lower-calorie foods and stick to it.  Cooking  • Try to cook your favorite foods in a healthier way. For example, try baking instead  of frying.  • Use low-fat dairy products.  Meal planning  • Use more fruits and vegetables. One-half of your plate should be fruits and vegetables.  • Include lean proteins, such as chicken, turkey, and fish.  Lifestyle  Each week, aim to do one of the following:  • 150 minutes of moderate exercise, such as walking.  • 75 minutes of vigorous exercise, such as running.  General information  • Know how many calories are in the foods you eat most often. This will help you calculate calorie counts faster.  • Find a way of tracking calories that works for you. Get creative. Try different apps or programs if writing down calories does not work for you.  What foods should I eat?    1. Eat nutritious foods. It is better to have a nutritious, high-calorie food, such as an avocado, than a food with few nutrients, such as a bag of potato chips.  2. Use your calories on foods and drinks that will fill you up and will not leave you hungry soon after eating.  ? Examples of foods that fill you up are nuts and nut butters, vegetables, lean proteins, and high-fiber foods such as whole grains. High-fiber foods are foods with more than 5 g of fiber per serving.  3. Pay attention to calories in drinks. Low-calorie drinks include water and unsweetened drinks.  The items listed above may not be a complete list of foods and beverages you can eat. Contact a dietitian for more information.  What foods should I limit?  Limit foods or drinks that are not good sources of vitamins, minerals, or protein or that are high in unhealthy fats. These include:  • Candy.  • Other sweets.  • Sodas, specialty coffee drinks, alcohol, and juice.  The items listed above may not be a complete list of foods and beverages you should avoid. Contact a dietitian for more information.  How do I count calories when eating out?  1. Pay attention to portions. Often, portions are much larger when eating out. Try these tips to keep portions smaller:  ? Consider sharing  a meal instead of getting your own.  ? If you get your own meal, eat only half of it. Before you start eating, ask for a container and put half of your meal into it.  ? When available, consider ordering smaller portions from the menu instead of full portions.  2. Pay attention to your food and drink choices. Knowing the way food is cooked and what is included with the meal can help you eat fewer calories.  ? If calories are listed on the menu, choose the lower-calorie options.  ? Choose dishes that include vegetables, fruits, whole grains, low-fat dairy products, and lean proteins.  ? Choose items that are boiled, broiled, grilled, or steamed. Avoid items that are buttered, battered, fried, or served with cream sauce. Items labeled as crispy are usually fried, unless stated otherwise.  ? Choose water, low-fat milk, unsweetened iced tea, or other drinks without added sugar. If you want an alcoholic beverage, choose a lower-calorie option, such as a glass of wine or light beer.  ? Ask for dressings, sauces, and syrups on the side. These are usually high in calories, so you should limit the amount you eat.  ? If you want a salad, choose a garden salad and ask for grilled meats. Avoid extra toppings such as buckner, cheese, or fried items. Ask for the dressing on the side, or ask for olive oil and vinegar or lemon to use as dressing.  3. Estimate how many servings of a food you are given. Knowing serving sizes will help you be aware of how much food you are eating at restaurants.  Where to find more information  • Centers for Disease Control and Prevention: www.cdc.gov  • [place] Department of Agriculture: myplate.gov  Summary  • Calorie counting means keeping track of how many calories you eat and drink each day. If you eat fewer calories than your body needs, you should lose weight.  • A healthy amount of weight to lose per week is usually 1-2 lb (0.5-0.9 kg). This usually means reducing your daily calorie intake by  500-750 calories.  • The number of calories in a food can be found on a Nutrition Facts label. If a food does not have a Nutrition Facts label, try to look up the calories online or ask your dietitian for help.  • Use smaller plates, glasses, and bowls for smaller portions and to prevent overeating.  • Use your calories on foods and drinks that will fill you up and not leave you hungry shortly after a meal.  This information is not intended to replace advice given to you by your health care provider. Make sure you discuss any questions you have with your health care provider.  Document Revised: 01/28/2021 Document Reviewed: 01/28/2021  [employer] Patient Education © 2022 [employer]    I spent 74 minutes face to face with the patient, obtaining history, doing physical exam, discussing diagnoses, discussing treatment and plan, discussing cardiac prevention, reviewing records that she brought with her and reviewing records in care everywhere, ordering tests, ordering procedures, making referrals, and documenting in the chart.    Plan of care reviewed with patient at the conclusion of today's visit. Education was provided regarding diagnosis and management.  Patient verbalizes understanding of and agreement with management plan.    Return in about 4 months (around 4/13/2023) for Medicare Wellness welcome.    Dictated Utilizing Dragon Dictation      Elen Tovar MD   Transcribed from ambient dictation for Elen Tovar MD by Nicky Quezada.  12/13/22   20:30 EST    Patient or patient representative verbalized consent to the visit recording.

## 2022-12-14 NOTE — ASSESSMENT & PLAN NOTE
- Encouraged to use Neutrogena T gel shampoo or Selsun blue at least three times per week.   - May use other shampoos at other times during the week.

## 2022-12-14 NOTE — ASSESSMENT & PLAN NOTE
- We will refer her to Dr. Lipscomb for an appointment in 04/2023, when she returns to [place] from [place].   - Advised to use a cold pack on the knee after exercise and for pain, swelling, or inflammation.   - May take Aleve as needed. Advised to take Aleve with food to protect the stomach.  - May also take Tylenol as needed and use Voltaren gel as needed.

## 2022-12-14 NOTE — ASSESSMENT & PLAN NOTE
- Her latest LDL measure in 2022 was 183 mg/dL with predominance of small LDL at 1513 nmol/L and she has low HDL. There is a strong family history of hyperlipidemia and she has a brother who  of MI at age 65. Several aunts and uncles on both parent's side had heart disease.   - We will start a small dose of rosuvastatin 5 mg tablet. She is advised to take 1 tablet once per week in the evening. She may take it with the evening meal or anytime between there and bedtime.   - After about 1 month if she is tolerating the rosuvastatin, she will increase it to 2 tablets per week on Monday and Thursday. After another month if she is tolerating that, she will increase to 3 nights per week and gradually try to build up to every night.   - Begin CoQ10 400 mg capsule every day to prevent statin side effects.  - She will let us know if she is having trouble increasing her dose.  - Continue to decrease the fats and sugars in her diet.   - Continue to increase exercise.   - Weigh once per week at home to monitor progress on weight loss.

## 2023-02-07 ENCOUNTER — TELEPHONE (OUTPATIENT)
Dept: INTERNAL MEDICINE | Facility: CLINIC | Age: 68
End: 2023-02-07

## 2023-02-07 NOTE — TELEPHONE ENCOUNTER
Caller: Stephani Bhardwaj    Relationship: Self    Best call back number: [phone]    What is the best time to reach you: ANY    Who are you requesting to speak with (clinical staff, provider,  specific staff member): DWAYNE KENNY    Do you know the name of the person who called: SELF    What was the call regarding: PATIENT WAS HAVING SOME ISSUES WITH HER CRESTOR MEDICATION SHE WOULD LIKE TO DISCUSS WITH HER PRIMARY CARE PROVIDER. SHE HAS CURRENTLY STOPPED IT DUE TO HEADACHES, DIZZINESS AND NAUSEA AND NEEDS TO KNOW WHAT HER PROVIDER WOULD LIKE HER TO DO.    Do you require a callback: YES

## 2023-02-07 NOTE — TELEPHONE ENCOUNTER
LVM for patient to return call    HUB TO READ: Please advise patient to stop the rosuvastatin for a couple of weeks to see if her symptoms resolve.  After they resolved, resume the rosuvastatin at just 1/2 tablet in the evening's 2 or 3 nights per week.  Let us know if she cannot tolerate that low dose.

## 2023-02-28 RX ORDER — ROSUVASTATIN CALCIUM 10 MG/1
10 TABLET, COATED ORAL NIGHTLY
Qty: 90 TABLET | Refills: 1 | Status: SHIPPED | OUTPATIENT
Start: 2023-02-28

## 2023-04-18 ENCOUNTER — OFFICE VISIT (OUTPATIENT)
Dept: INTERNAL MEDICINE | Facility: CLINIC | Age: 68
End: 2023-04-18
Payer: MEDICARE

## 2023-04-18 VITALS
DIASTOLIC BLOOD PRESSURE: 66 MMHG | OXYGEN SATURATION: 97 % | HEART RATE: 91 BPM | BODY MASS INDEX: 32.36 KG/M2 | HEIGHT: 65 IN | SYSTOLIC BLOOD PRESSURE: 138 MMHG | WEIGHT: 194.2 LBS | TEMPERATURE: 98.4 F

## 2023-04-18 DIAGNOSIS — E66.09 CLASS 1 OBESITY DUE TO EXCESS CALORIES WITH SERIOUS COMORBIDITY AND BODY MASS INDEX (BMI) OF 32.0 TO 32.9 IN ADULT: Chronic | ICD-10-CM

## 2023-04-18 DIAGNOSIS — Z23 NEED FOR VACCINATION: ICD-10-CM

## 2023-04-18 DIAGNOSIS — E78.2 MIXED HYPERLIPIDEMIA: Chronic | ICD-10-CM

## 2023-04-18 DIAGNOSIS — L40.9 PSORIASIS OF SCALP: Chronic | ICD-10-CM

## 2023-04-18 DIAGNOSIS — N95.9 MENOPAUSAL AND POSTMENOPAUSAL DISORDER: ICD-10-CM

## 2023-04-18 DIAGNOSIS — K21.9 GASTROESOPHAGEAL REFLUX DISEASE WITHOUT ESOPHAGITIS: Chronic | ICD-10-CM

## 2023-04-18 DIAGNOSIS — R35.0 URINARY FREQUENCY: Chronic | ICD-10-CM

## 2023-04-18 DIAGNOSIS — R04.0 EPISTAXIS, RECURRENT: Chronic | ICD-10-CM

## 2023-04-18 DIAGNOSIS — L85.3 DRY SKIN: Chronic | ICD-10-CM

## 2023-04-18 DIAGNOSIS — Z12.31 BREAST CANCER SCREENING BY MAMMOGRAM: ICD-10-CM

## 2023-04-18 DIAGNOSIS — Z00.00 WELCOME TO MEDICARE PREVENTIVE VISIT: Primary | ICD-10-CM

## 2023-04-18 DIAGNOSIS — E88.81 METABOLIC SYNDROME: Chronic | ICD-10-CM

## 2023-04-18 PROBLEM — E88.810 METABOLIC SYNDROME: Chronic | Status: ACTIVE | Noted: 2023-04-18

## 2023-04-18 RX ORDER — TIRZEPATIDE 2.5 MG/.5ML
2.5 INJECTION, SOLUTION SUBCUTANEOUS WEEKLY
Qty: 2 ML | Refills: 0 | Status: SHIPPED | OUTPATIENT
Start: 2023-04-18 | End: 2023-04-20

## 2023-04-18 RX ORDER — OMEPRAZOLE 40 MG/1
40 CAPSULE, DELAYED RELEASE ORAL DAILY
Qty: 90 CAPSULE | Refills: 1 | Status: SHIPPED | OUTPATIENT
Start: 2023-04-18

## 2023-04-18 RX ORDER — ROSUVASTATIN CALCIUM 5 MG/1
5 TABLET, COATED ORAL 3 TIMES WEEKLY
COMMUNITY

## 2023-04-18 RX ORDER — FLUOCINONIDE TOPICAL SOLUTION USP, 0.05% 0.5 MG/ML
SOLUTION TOPICAL 2 TIMES DAILY
Qty: 60 ML | Refills: 5 | Status: SHIPPED | OUTPATIENT
Start: 2023-04-18

## 2023-04-18 RX ORDER — ACYCLOVIR 400 MG/1
1 TABLET ORAL 2 TIMES DAILY PRN
COMMUNITY
Start: 2023-04-06 | End: 2023-04-18

## 2023-04-18 NOTE — ASSESSMENT & PLAN NOTE
She will continue to improve regular exercise and walking. Continue to decrease fats and sugars in the diet. Weigh at home once per week to monitor progress.

## 2023-04-18 NOTE — ASSESSMENT & PLAN NOTE
She will continue 5 mg of rosuvastatin 3 times per week. She will continue CoQ10 nightly. Continue to increase regular exercise and walking. Continue to improve low-fat, low-sugar diet.

## 2023-04-18 NOTE — ASSESSMENT & PLAN NOTE
Most likely menopausal related. She will continue hormone replacement with the GYN. They might also recommend some vaginal estrogen to see if that helps.

## 2023-04-18 NOTE — ASSESSMENT & PLAN NOTE
She will continue metformin 1000 mg daily with the evening meal. She will resume Mounjaro (tirzepatide) 2.5 mg injection once per week.

## 2023-04-18 NOTE — Clinical Note
Please call pt. Let her know Dr. Gonzalez did not have colonoscopy report.  Hopefully pt can remember who did it or where so we can obtain report

## 2023-04-18 NOTE — PROGRESS NOTES
"The ABCs of the Annual Wellness Visit  Initial Medicare Wellness Visit    Chief Complaint   Patient presents with   • Welcome To Medicare   • Hyperlipidemia       Subjective   History of Present Illness:  Stephani Tovar is a 67 y.o. female who presents for an Initial Medicare Wellness Visit.      The patient presents today for a Welcome to Medicare visit.    GERD  The patient states that she recently went to the emergency room for chest pain. She notes that she was grieving the loss of her horse which increased her stressed levels. She was concerned that she was having a heart attack; however she was diagnosed with GERD. Her chest pain has since resolved. She was instructed to take omeprazole. The omeprazole has slowed it down; however, her stress exacerbated it.    Epistaxis  The patient states that before she went to [place], she took a toaster oven off of her high shelf, and the drawer slipped and hit her in the nose and head. She has had frequent epistaxis since the incident. She has an appointment scheduled with an ENT. The patient thinks she might need some cauterization internally. She noticed the bleeding 3 times in a week from the right nostril. The patient feels congestion in her nose. She coughed up a blood clot the size of a quarter out of her throat, and it is draining in the back. The patient's nose is swollen. The injury happened in 11/2022 before she left. She did not go to the emergency room at that time.    Urinary frequency  She is experiencing frequency of urination. When she was in her 50s, she had a \"box \" placed for her bladder for emptying. The patient had it removed. She used to get up once in the middle of the night around 4:00 AM, and now some nights she gets up at 2:00 AM, 4:00 AM, and 6:00 AM to urinate. The patient denies any burning with urination. It is a full water. It is just a few pill drops like it was a urinary tract infection.    Dry skin  She has noticed " in the last year that her skin is increasingly dry. The patient saw a dermatologist, and they instructed her to use Vanicream. She has been trying to calm it down. The patient reports taking hot showers daily.    Psoriasis of the scalp  It is very climate orientated. When she was in [place], her symptoms resolved. As soon as she came back, she  began itching. The patient uses tucinoid ointment. It is in her hair area, mostly above her right ear, and the back of her head. It does seem to help.    Hyperlipidemia  She is taking rosuvastatin 5 mg 3 times a week. The patient denies any joint or muscle aches.    Weight loss  She tried Mounjaro when she was 66 years old. The patient was on it for 1 month, and that is when she turned 67 years old, and they denied it. She does have the bloating abdomen. She wants to lose this extra weight. The patient has gained 7 pounds. She has a  and diet plan that she ordered. When she was on the Mounjaro 5 mg, she did lose some weight. She did not have too much of the nausea. The patient did not think she could go any higher than 5 mg because she felt like her throat was constricting a little.    Hypertension  Her blood pressure is a little high at 138/66 mmHg. The patient does not check her blood pressure at home. It has been in the 130s for a while now. Her mother had a history of high blood pressure and high cholesterol. The patient does not normally develop any swelling in her feet.    Health maintenance  She has never had a shingles injection. 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 She had a pneumonia vaccine in 2020. The patient is up to date on hepatitis A and tetanus. The patient is due for a colonoscopy. She has not had a bone density test in a while. The patient usually gets her mammogram done in 08/2023.    CHRONIC CONDITIONS:    HEALTH RISK ASSESSMENT    Recent Hospitalizations:  She was not admitted to the hospital during the last year.        Current Medical Providers:  Patient Care Team:  Elen Tovar MD as PCP - General (Internal Medicine)    Smoking Status:  Social History     Tobacco Use   Smoking Status Never   Smokeless Tobacco Never       Alcohol Consumption:  Social History     Substance and Sexual Activity   Alcohol Use Yes    Comment: 2-3 drinks per week       Depression Screen:       4/18/2023     3:33 PM   PHQ-2/PHQ-9 Depression Screening   Little Interest or Pleasure in Doing Things 0-->not at all   Feeling Down, Depressed or Hopeless 0-->not at all   PHQ-9: Brief Depression Severity Measure Score 0       Fall Risk Screen:  STEADI Fall Risk Assessment was completed, and patient is at LOW risk for falls.Assessment completed on:4/18/2023    Health Habits and Functional and Cognitive Screening:       View : No data to display.                  Age-appropriate Screening Schedule:  Refer to the list below for future screening recommendations based on patient's age, sex and/or medical conditions. Orders for these recommended tests are listed in the plan section. The patient has been provided with a written plan.    Health Maintenance   Topic Date Due   • DXA SCAN  Never done   • ZOSTER VACCINE (1 of 2) Never done   • HEPATITIS C SCREENING  Never done   • ANNUAL WELLNESS VISIT  Never done   • INFLUENZA VACCINE  08/01/2023   • LIPID PANEL  02/27/2024   • COLORECTAL CANCER SCREENING  04/06/2024   • MAMMOGRAM  09/27/2024   • TDAP/TD VACCINES (3 - Td or Tdap) 10/07/2030   • Pneumococcal Vaccine 65+  Completed   • COVID-19 Vaccine  Discontinued        The following portions of the patient's history were reviewed and updated as appropriate: allergies, current medications, past family history, past medical history, past social history, past surgical history and problem list.    Does the patient have evidence of cognitive impairment? No    Aspirin is not on active medication list.  Aspirin use is not indicated based on review of current medical  condition/s. Risk of harm outweighs potential benefits.  .    Outpatient Medications Prior to Visit   Medication Sig Dispense Refill   • Ascorbic Acid (VITAMIN C PO) 1000mg once daily     • Coenzyme Q10 400 MG capsule Take 1 capsule by mouth Every Night. 90 capsule 3   • CREAM BASE EX Apply  topically to the appropriate area as directed. Apply topically Testosterone 0.3mg/progesterone 25mg/0.5ml TD daily in syringes OK TO FILL 4 MONTH SUPPLY .     • estradiol (MINIVELLE, VIVELLE-DOT) 0.05 MG/24HR patch 1 patch 2 (Two) Times a Week.     • loratadine (CLARITIN) 10 MG tablet Take 1 tablet by mouth Daily.     • NON FORMULARY Indoplex w/DIM  1 capsule daily     • NON FORMULARY Reservatrol  Once daily     • Nutritional Supplements (DHEA PO) Pt takes 5mg daily     • rosuvastatin (CRESTOR) 5 MG tablet Take 1 tablet by mouth 3 (Three) Times a Week.     • Triamcinolone Acetonide (NASACORT ALLERGY 24HR NA) Daily As Needed. 2 sprays in each nostril per day     • valACYclovir (VALTREX) 500 MG tablet Take 1 tablet by mouth Daily. Takes PRN     • fluocinonide (LIDEX) 0.05 % external solution fluocinonide 0.05 % topical solution   APPLY TO THE AFFECTED AREA(S) ON SCALP BY TOPICAL ROUTE 2 TIMES PER DAY X 2 WEEKS PRN FLARES     • metFORMIN (GLUCOPHAGE) 500 MG tablet Take 2 tablets by mouth Every Evening. 2 tablets every evening     • acyclovir (ZOVIRAX) 400 MG tablet Take 1 tablet by mouth 2 (Two) Times a Day As Needed. (Patient not taking: Reported on 4/18/2023)     • CHROMIUM PICOLINATE PO  (Patient not taking: Reported on 4/18/2023)     • clobetasol (TEMOVATE) 0.05 % external solution clobetasol 0.05 % scalp solution   Apply to areas of psoriasis in the scalp 2-3 times a week, as needed. (Patient not taking: Reported on 4/18/2023)     • rosuvastatin (CRESTOR) 10 MG tablet Take 1 tablet by mouth Every Night. (Patient not taking: Reported on 4/18/2023) 90 tablet 1   • Tirzepatide (Mounjaro) 5 MG/0.5ML solution pen-injector Inject 5  "mg under the skin into the appropriate area as directed. (Patient not taking: Reported on 4/18/2023)       No facility-administered medications prior to visit.       Patient Active Problem List   Diagnosis   • Mixed hyperlipidemia   • Statin intolerance   • Chronic pain of left knee   • Class 1 obesity due to excess calories with serious comorbidity and body mass index (BMI) of 32.0 to 32.9 in adult   • Psoriasis of scalp   • Family history of fatty liver   • Perennial allergic rhinitis with seasonal variation   • Herpes labialis without complication   • Gastroesophageal reflux disease without esophagitis   • Epistaxis, recurrent   • Urinary frequency   • Dry skin   • Metabolic syndrome       Advanced Care Planning:  Advance Directive is not on file.  ACP discussion was held with the patient during this visit. Patient does not have an advance directive, information provided.    Review of Systems    Compared to one year ago, the patient feels her physical health is the same.  Compared to one year ago, the patient feels her mental health is the same.    Reviewed chart for potential of high risk medication in the elderly: yes  Reviewed chart for potential of harmful drug interactions in the elderly:yes    Objective         Vitals:    04/18/23 1534   BP: 138/66   BP Location: Left arm   Patient Position: Sitting   Cuff Size: Adult   Pulse: 91   Temp: 98.4 °F (36.9 °C)   TempSrc: Infrared   SpO2: 97%   Weight: 88.1 kg (194 lb 3.2 oz)   Height: 164 cm (64.57\")     BMI is >= 30 and <35. (Class 1 Obesity). The following options were offered after discussion;: weight loss educational material (shared in after visit summary), exercise counseling/recommendations and nutrition counseling/recommendations    Body mass index is 32.75 kg/m².  Discussed the patient's BMI with her. The BMI is above average; BMI management plan is completed.    Physical Exam  Vitals and nursing note reviewed.   Constitutional:       Appearance: She is " well-developed.   HENT:      Head: Normocephalic.   Eyes:      Conjunctiva/sclera: Conjunctivae normal.      Pupils: Pupils are equal, round, and reactive to light.   Neck:      Thyroid: No thyromegaly.   Cardiovascular:      Rate and Rhythm: Normal rate and regular rhythm.      Heart sounds: Normal heart sounds.   Pulmonary:      Effort: Pulmonary effort is normal.      Breath sounds: Normal breath sounds. No wheezing.   Chest:   Breasts:     Right: No inverted nipple, mass, nipple discharge, skin change or tenderness.      Left: No inverted nipple, mass, nipple discharge, skin change or tenderness.      Comments: She has had breast reduction bilaterally with implants. Smooth chest wall, well-healed remote incisions. No axillary lymphadenopathy.  Abdominal:      General: Bowel sounds are normal.      Palpations: Abdomen is soft.      Tenderness: There is no abdominal tenderness.   Musculoskeletal:         General: No tenderness. Normal range of motion.      Cervical back: Normal range of motion and neck supple.   Lymphadenopathy:      Cervical: No cervical adenopathy.      Upper Body:      Right upper body: No axillary adenopathy.      Left upper body: No axillary adenopathy.   Skin:     General: Skin is warm and dry.      Findings: No rash.   Neurological:      Mental Status: She is alert and oriented to person, place, and time.      Cranial Nerves: No cranial nerve deficit.      Sensory: No sensory deficit.      Coordination: Coordination normal.      Gait: Gait normal.   Psychiatric:         Speech: Speech normal.         Behavior: Behavior normal.         Thought Content: Thought content normal.         Judgment: Judgment normal.           ECG 12 Lead    Date/Time: 4/18/2023 4:35 PM  Performed by: Elen Tovar MD  Authorized by: Elen Tovar MD   Comparison: compared with previous ECG   Rhythm: sinus rhythm  Rate: normal  BPM: 83  Conduction: incomplete right bundle branch block  ST Segments: ST  segments normal  T Waves: T waves normal  QRS axis: normal    Clinical impression: abnormal EKG                  Assessment & Plan   Medicare Risks and Personalized Health Plan  CMS Preventative Services Quick Reference  Cardiovascular risk  Obesity/Overweight   Osteoporosis Risk    The above risks/problems have been discussed with the patient.  Pertinent information has been shared with the patient in the After Visit Summary.  Follow up plans and orders are seen below in the Assessment/Plan Section.  Patient Instructions   Problem List Items Addressed This Visit        Cardiac and Vasculature    Mixed hyperlipidemia (Chronic)    Overview     LDL 2022 was 183 with predominance of small LDL at 1513.  She has low HDL.  She has a strong family history of hyperlipidemia.  She has a brother who  of MI at age 65.  Several aunts and uncles on both parents side had heart disease.    Taking rosuvastatin 5 mg 3 times a week.  Her latest LDL  2023 was improved to 117.          Current Assessment & Plan     She will continue 5 mg of rosuvastatin 3 times per week. She will continue CoQ10 nightly. Continue to increase regular exercise and walking. Continue to improve low-fat, low-sugar diet.         Relevant Medications    rosuvastatin (CRESTOR) 5 MG tablet    Other Relevant Orders    ECG 12 Lead       ENT    Epistaxis, recurrent (Chronic)    Overview     R nostril.         Current Assessment & Plan     She will see the ENT doctor as planned.            Endocrine and Metabolic    Class 1 obesity due to excess calories with serious comorbidity and body mass index (BMI) of 32.0 to 32.9 in adult (Chronic)    Current Assessment & Plan     She will continue to improve regular exercise and walking. Continue to decrease fats and sugars in the diet. Weigh at home once per week to monitor progress.         Metabolic syndrome (Chronic)    Current Assessment & Plan     She will continue metformin 1000 mg daily with the  evening meal. She will resume Mounjaro (tirzepatide) 2.5 mg injection once per week.            Gastrointestinal Abdominal     Gastroesophageal reflux disease without esophagitis (Chronic)    Overview     Taking omeprazole daily.         Current Assessment & Plan     She will continue taking omeprazole daily. Avoid eating close to bedtime. Avoid large meals.         Relevant Medications    omeprazole (priLOSEC) 40 MG capsule       Genitourinary and Reproductive     Urinary frequency (Chronic)    Current Assessment & Plan     Most likely menopausal related. She will continue hormone replacement with the GYN. They might also recommend some vaginal estrogen to see if that helps.            Skin    Psoriasis of scalp (Chronic)    Current Assessment & Plan     She will continue fluocinonide external solution.         Relevant Medications    fluocinonide (LIDEX) 0.05 % external solution    Dry skin (Chronic)    Current Assessment & Plan     She will continue using Vanicream lotion and avoid the hot shower. Drink plenty of water.         Relevant Medications    fluocinonide (LIDEX) 0.05 % external solution   Other Visit Diagnoses     Welcome to Medicare preventive visit    -  Primary    Menopausal and postmenopausal disorder        Relevant Orders    DEXA Bone Density Axial    Breast cancer screening by mammogram        Relevant Orders    Mammo Screening Digital Tomosynthesis Bilateral With CAD    Need for vaccination        Relevant Orders    Pneumococcal Conjugate Vaccine 20-Valent All (Completed)           Follow Up:  Next Medicare Wellness visit to be scheduled in 1 year.    Return in about 6 months (around 10/18/2023) for recheck fasting.    An After Visit Summary and PPPS were given to the patient.     Follow Up   Return in about 6 months (around 10/18/2023) for recheck fasting.  Patient was given instructions and counseling regarding her condition or for health maintenance advice. Please see specific information  pulled into the AVS if appropriate.     Transcribed from ambient dictation for Elen Tovar MD by Clemencia Syed.  04/18/23   18:58 EDT    Patient or patient representative verbalized consent to the visit recording.  I have personally performed the services described in this document as transcribed by the above individual, and it is both accurate and complete.  Elen Tovar MD  4/24/2023  09:38 EDT

## 2023-04-19 ENCOUNTER — TELEPHONE (OUTPATIENT)
Dept: INTERNAL MEDICINE | Facility: CLINIC | Age: 68
End: 2023-04-19

## 2023-04-19 NOTE — TELEPHONE ENCOUNTER
Caller: Philip Bhardwaj    Relationship: Self    Best call back number: [phone]    What is the best time to reach you: ANYTIME    Who are you requesting to speak with (clinical staff, provider,  specific staff member): CLINICAL    Do you know the name of the person who called: PHILIP    What was the call regarding: PATIENT GOT THE GOT HER LAST COLONOSCOPY, April 2022    Do you require a callback: YES

## 2023-04-20 ENCOUNTER — TELEPHONE (OUTPATIENT)
Dept: INTERNAL MEDICINE | Facility: CLINIC | Age: 68
End: 2023-04-20

## 2023-04-20 RX ORDER — SEMAGLUTIDE 1.34 MG/ML
0.25 INJECTION, SOLUTION SUBCUTANEOUS WEEKLY
Qty: 1.5 ML | Refills: 5 | Status: SHIPPED | OUTPATIENT
Start: 2023-04-20

## 2023-04-20 NOTE — TELEPHONE ENCOUNTER
Caller: Stephani Bhardwaj    Relationship: Self    Best call back number: [phone]    Requested Prescriptions:   OZEMPIC    Pharmacy where request should be sent: [id number] - [place], [place] - [address] [facility] AT [facility] [address]  - [phone]  - [phone] FX     Last office visit with prescribing clinician: 4/18/2023   Last telemedicine visit with prescribing clinician: Visit date not found   Next office visit with prescribing clinician: 10/19/2023     Additional details provided by patient: INSURANCE WILL NOT COVER MONJARO, WILL COVER OZEMPIC INSTEAD, PLEASE SEND PROPER DOSE IN    Does the patient have less than a 3 day supply:  [x] Yes  [] No    Would you like a call back once the refill request has been completed: [] Yes [x] No    If the office needs to give you a call back, can they leave a voicemail: [] Yes [x] No    Linden Ashby, [place]   04/20/23 10:58 EDT

## 2023-04-20 NOTE — PROGRESS NOTES
I HAVE CALLED OVER TO [facility] DO [facility].  THEY STATE SHE DOESN'T DO COLONOSCOPIES AND THERE IS NOT ONE IN THE PT CHART.

## 2023-04-24 NOTE — PATIENT INSTRUCTIONS
Patient Instructions   Problem List Items Addressed This Visit          Cardiac and Vasculature    Mixed hyperlipidemia (Chronic)    Overview     LDL 2022 was 183 with predominance of small LDL at 1513.  She has low HDL.  She has a strong family history of hyperlipidemia.  She has a brother who  of MI at age 65.  Several aunts and uncles on both parents side had heart disease.    Taking rosuvastatin 5 mg 3 times a week.  Her latest LDL  2023 was improved to 117.          Current Assessment & Plan     She will continue 5 mg of rosuvastatin 3 times per week. She will continue CoQ10 nightly. Continue to increase regular exercise and walking. Continue to improve low-fat, low-sugar diet.         Relevant Medications    rosuvastatin (CRESTOR) 5 MG tablet    Other Relevant Orders    ECG 12 Lead       ENT    Epistaxis, recurrent (Chronic)    Overview     R nostril.         Current Assessment & Plan     She will see the ENT doctor as planned.            Endocrine and Metabolic    Class 1 obesity due to excess calories with serious comorbidity and body mass index (BMI) of 32.0 to 32.9 in adult (Chronic)    Current Assessment & Plan     She will continue to improve regular exercise and walking. Continue to decrease fats and sugars in the diet. Weigh at home once per week to monitor progress.         Metabolic syndrome (Chronic)    Current Assessment & Plan     She will continue metformin 1000 mg daily with the evening meal. She will resume Mounjaro (tirzepatide) 2.5 mg injection once per week.            Gastrointestinal Abdominal     Gastroesophageal reflux disease without esophagitis (Chronic)    Overview     Taking omeprazole daily.         Current Assessment & Plan     She will continue taking omeprazole daily. Avoid eating close to bedtime. Avoid large meals.         Relevant Medications    omeprazole (priLOSEC) 40 MG capsule       Genitourinary and Reproductive     Urinary frequency (Chronic)    Current  Assessment & Plan     Most likely menopausal related. She will continue hormone replacement with the GYN. They might also recommend some vaginal estrogen to see if that helps.            Skin    Psoriasis of scalp (Chronic)    Current Assessment & Plan     She will continue fluocinonide external solution.         Relevant Medications    fluocinonide (LIDEX) 0.05 % external solution    Dry skin (Chronic)    Current Assessment & Plan     She will continue using Vanicream lotion and avoid the hot shower. Drink plenty of water.         Relevant Medications    fluocinonide (LIDEX) 0.05 % external solution     Other Visit Diagnoses       Welcome to Medicare preventive visit    -  Primary    Menopausal and postmenopausal disorder        Relevant Orders    DEXA Bone Density Axial    Breast cancer screening by mammogram        Relevant Orders    Mammo Screening Digital Tomosynthesis Bilateral With CAD    Need for vaccination        Relevant Orders    Pneumococcal Conjugate Vaccine 20-Valent All (Completed)           BMI for Adults  What is BMI?  Body mass index (BMI) is a number that is calculated from a person's weight and height. BMI can help estimate how much of a person's weight is composed of fat. BMI does not measure body fat directly. Rather, it is an alternative to procedures that directly measure body fat, which can be difficult and expensive.  BMI can help identify people who may be at higher risk for certain medical problems.  What are BMI measurements used for?  BMI is used as a screening tool to identify possible weight problems. It helps determine whether a person is obese, overweight, a healthy weight, or underweight.  BMI is useful for:  Identifying a weight problem that may be related to a medical condition or may increase the risk for medical problems.  Promoting changes, such as changes in diet and exercise, to help reach a healthy weight. BMI screening can be repeated to see if these changes are  "working.  How is BMI calculated?  BMI involves measuring your weight in relation to your height. Both height and weight are measured, and the BMI is calculated from those numbers. This can be done either in English ([place] or metric measurements. Note that charts and online BMI calculators are available to help you find your BMI quickly and easily without having to do these calculations yourself.  To calculate your BMI in English ([place] measurements:    Measure your weight in pounds (lb).  Multiply the number of pounds by 703.  For example, for a person who weighs 180 lb, multiply that number by 703, which equals 126,540.  Measure your height in inches. Then multiply that number by itself to get a measurement called \"inches squared.\"  For example, for a person who is 70 inches tall, the \"inches squared\" measurement is 70 inches x 70 inches, which equals 4,900 inches squared.  Divide the total from step 2 (number of lb x 703) by the total from step 3 (inches squared): 126,540 ÷ 4,900 = 25.8. This is your BMI.  To calculate your BMI in metric measurements:  Measure your weight in kilograms (kg).  Measure your height in meters (m). Then multiply that number by itself to get a measurement called \"meters squared.\"  For example, for a person who is 1.75 m tall, the \"meters squared\" measurement is 1.75 m x 1.75 m, which is equal to 3.1 meters squared.  Divide the number of kilograms (your weight) by the meters squared number. In this example: 70 ÷ 3.1 = 22.6. This is your BMI.  What do the results mean?  BMI charts are used to identify whether you are underweight, normal weight, overweight, or obese. The following guidelines will be used:  Underweight: BMI less than 18.5.  Normal weight: BMI between 18.5 and 24.9.  Overweight: BMI between 25 and 29.9.  Obese: BMI of 30 or above.  Keep these notes in mind:  Weight includes both fat and muscle, so someone with a muscular build, such as an athlete, may have a BMI that is " higher than 24.9. In cases like these, BMI is not an accurate measure of body fat.  To determine if excess body fat is the cause of a BMI of 25 or higher, further assessments may need to be done by a health care provider.  BMI is usually interpreted in the same way for men and women.  Where to find more information  For more information about BMI, including tools to quickly calculate your BMI, go to these websites:  Centers for Disease Control and Prevention: www.cdc.gov  American Heart Association: www.heart.org  National Heart, Lung, and Blood [place]: www.nhlbi.nih.gov  Summary  Body mass index (BMI) is a number that is calculated from a person's weight and height.  BMI may help estimate how much of a person's weight is composed of fat. BMI can help identify those who may be at higher risk for certain medical problems.  BMI can be measured using English measurements or metric measurements.  BMI charts are used to identify whether you are underweight, normal weight, overweight, or obese.  This information is not intended to replace advice given to you by your health care provider. Make sure you discuss any questions you have with your health care provider.  Document Revised: 09/09/2020 Document Reviewed: 07/17/2020  [employer] Patient Education © 2022 [employer]  Calorie Counting for Weight Loss  Calories are units of energy. Your body needs a certain number of calories from food to keep going throughout the day. When you eat or drink more calories than your body needs, your body stores the extra calories mostly as fat. When you eat or drink fewer calories than your body needs, your body burns fat to get the energy it needs.  Calorie counting means keeping track of how many calories you eat and drink each day. Calorie counting can be helpful if you need to lose weight. If you eat fewer calories than your body needs, you should lose weight. Ask your health care provider what a healthy weight is for you.  For  calorie counting to work, you will need to eat the right number of calories each day to lose a healthy amount of weight per week. A dietitian can help you figure out how many calories you need in a day and will suggest ways to reach your calorie goal.  A healthy amount of weight to lose each week is usually 1-2 lb (0.5-0.9 kg). This usually means that your daily calorie intake should be reduced by 500-750 calories.  Eating 1,200-1,500 calories a day can help most women lose weight.  Eating 1,500-1,800 calories a day can help most men lose weight.  What do I need to know about calorie counting?  Work with your health care provider or dietitian to determine how many calories you should get each day. To meet your daily calorie goal, you will need to:  Find out how many calories are in each food that you would like to eat. Try to do this before you eat.  Decide how much of the food you plan to eat.  Keep a food log. Do this by writing down what you ate and how many calories it had.  To successfully lose weight, it is important to balance calorie counting with a healthy lifestyle that includes regular activity.  Where do I find calorie information?  The number of calories in a food can be found on a Nutrition Facts label. If a food does not have a Nutrition Facts label, try to look up the calories online or ask your dietitian for help.  Remember that calories are listed per serving. If you choose to have more than one serving of a food, you will have to multiply the calories per serving by the number of servings you plan to eat. For example, the label on a package of bread might say that a serving size is 1 slice and that there are 90 calories in a serving. If you eat 1 slice, you will have eaten 90 calories. If you eat 2 slices, you will have eaten 180 calories.  How do I keep a food log?  After each time that you eat, record the following in your food log as soon as possible:  What you ate. Be sure to include toppings,  sauces, and other extras on the food.  How much you ate. This can be measured in cups, ounces, or number of items.  How many calories were in each food and drink.  The total number of calories in the food you ate.  Keep your food log near you, such as in a pocket-sized notebook or on an margaret or website on your mobile phone. Some programs will calculate calories for you and show you how many calories you have left to meet your daily goal.  What are some portion-control tips?  Know how many calories are in a serving. This will help you know how many servings you can have of a certain food.  Use a measuring cup to measure serving sizes. You could also try weighing out portions on a kitchen scale. With time, you will be able to estimate serving sizes for some foods.  Take time to put servings of different foods on your favorite plates or in your favorite bowls and cups so you know what a serving looks like.  Try not to eat straight from a food's packaging, such as from a bag or box. Eating straight from the package makes it hard to see how much you are eating and can lead to overeating. Put the amount you would like to eat in a cup or on a plate to make sure you are eating the right portion.  Use smaller plates, glasses, and bowls for smaller portions and to prevent overeating.  Try not to multitask. For example, avoid watching TV or using your computer while eating. If it is time to eat, sit down at a table and enjoy your food. This will help you recognize when you are full. It will also help you be more mindful of what and how much you are eating.  What are tips for following this plan?  Reading food labels  Check the calorie count compared with the serving size. The serving size may be smaller than what you are used to eating.  Check the source of the calories. Try to choose foods that are high in protein, fiber, and vitamins, and low in saturated fat, trans fat, and sodium.  Shopping  Read nutrition labels while you  shop. This will help you make healthy decisions about which foods to buy.  Pay attention to nutrition labels for low-fat or fat-free foods. These foods sometimes have the same number of calories or more calories than the full-fat versions. They also often have added sugar, starch, or salt to make up for flavor that was removed with the fat.  Make a grocery list of lower-calorie foods and stick to it.  Cooking  Try to cook your favorite foods in a healthier way. For example, try baking instead of frying.  Use low-fat dairy products.  Meal planning  Use more fruits and vegetables. One-half of your plate should be fruits and vegetables.  Include lean proteins, such as chicken, turkey, and fish.  Lifestyle  Each week, aim to do one of the followin minutes of moderate exercise, such as walking.  75 minutes of vigorous exercise, such as running.  General information  Know how many calories are in the foods you eat most often. This will help you calculate calorie counts faster.  Find a way of tracking calories that works for you. Get creative. Try different apps or programs if writing down calories does not work for you.  What foods should I eat?    Eat nutritious foods. It is better to have a nutritious, high-calorie food, such as an avocado, than a food with few nutrients, such as a bag of potato chips.  Use your calories on foods and drinks that will fill you up and will not leave you hungry soon after eating.  Examples of foods that fill you up are nuts and nut butters, vegetables, lean proteins, and high-fiber foods such as whole grains. High-fiber foods are foods with more than 5 g of fiber per serving.  Pay attention to calories in drinks. Low-calorie drinks include water and unsweetened drinks.  The items listed above may not be a complete list of foods and beverages you can eat. Contact a dietitian for more information.  What foods should I limit?  Limit foods or drinks that are not good sources of  vitamins, minerals, or protein or that are high in unhealthy fats. These include:  Candy.  Other sweets.  Sodas, specialty coffee drinks, alcohol, and juice.  The items listed above may not be a complete list of foods and beverages you should avoid. Contact a dietitian for more information.  How do I count calories when eating out?  Pay attention to portions. Often, portions are much larger when eating out. Try these tips to keep portions smaller:  Consider sharing a meal instead of getting your own.  If you get your own meal, eat only half of it. Before you start eating, ask for a container and put half of your meal into it.  When available, consider ordering smaller portions from the menu instead of full portions.  Pay attention to your food and drink choices. Knowing the way food is cooked and what is included with the meal can help you eat fewer calories.  If calories are listed on the menu, choose the lower-calorie options.  Choose dishes that include vegetables, fruits, whole grains, low-fat dairy products, and lean proteins.  Choose items that are boiled, broiled, grilled, or steamed. Avoid items that are buttered, battered, fried, or served with cream sauce. Items labeled as crispy are usually fried, unless stated otherwise.  Choose water, low-fat milk, unsweetened iced tea, or other drinks without added sugar. If you want an alcoholic beverage, choose a lower-calorie option, such as a glass of wine or light beer.  Ask for dressings, sauces, and syrups on the side. These are usually high in calories, so you should limit the amount you eat.  If you want a salad, choose a garden salad and ask for grilled meats. Avoid extra toppings such as buckner, cheese, or fried items. Ask for the dressing on the side, or ask for olive oil and vinegar or lemon to use as dressing.  Estimate how many servings of a food you are given. Knowing serving sizes will help you be aware of how much food you are eating at  restaurants.  Where to find more information  Centers for Disease Control and Prevention: www.cdc.gov  [place] Department of Agriculture: myplate.gov  Summary  Calorie counting means keeping track of how many calories you eat and drink each day. If you eat fewer calories than your body needs, you should lose weight.  A healthy amount of weight to lose per week is usually 1-2 lb (0.5-0.9 kg). This usually means reducing your daily calorie intake by 500-750 calories.  The number of calories in a food can be found on a Nutrition Facts label. If a food does not have a Nutrition Facts label, try to look up the calories online or ask your dietitian for help.  Use smaller plates, glasses, and bowls for smaller portions and to prevent overeating.  Use your calories on foods and drinks that will fill you up and not leave you hungry shortly after a meal.  This information is not intended to replace advice given to you by your health care provider. Make sure you discuss any questions you have with your health care provider.  Document Revised: 01/28/2021 Document Reviewed: 01/28/2021  [employer] Patient Education © 2022 [employer]

## 2023-04-24 NOTE — PROGRESS NOTES
Called and spoke with pt.  Dr. Gabe Baugh done her last colonoscopy.  I have called Dr. Gabe Baugh's office and they are faxing the EGD/Colonoscopy done in 2019.  She is due 2024.

## 2023-05-02 ENCOUNTER — OFFICE VISIT (OUTPATIENT)
Dept: ORTHOPEDIC SURGERY | Facility: CLINIC | Age: 68
End: 2023-05-02
Payer: MEDICARE

## 2023-05-02 VITALS
HEIGHT: 65 IN | DIASTOLIC BLOOD PRESSURE: 88 MMHG | WEIGHT: 194.2 LBS | BODY MASS INDEX: 32.36 KG/M2 | SYSTOLIC BLOOD PRESSURE: 144 MMHG

## 2023-05-02 DIAGNOSIS — M17.12 PRIMARY OSTEOARTHRITIS OF LEFT KNEE: Primary | ICD-10-CM

## 2023-05-02 DIAGNOSIS — M25.551 RIGHT HIP PAIN: ICD-10-CM

## 2023-05-02 DIAGNOSIS — M70.61 TROCHANTERIC BURSITIS OF RIGHT HIP: ICD-10-CM

## 2023-05-02 DIAGNOSIS — M25.562 LEFT KNEE PAIN, UNSPECIFIED CHRONICITY: ICD-10-CM

## 2023-05-02 PROCEDURE — 99204 OFFICE O/P NEW MOD 45 MIN: CPT | Performed by: ORTHOPAEDIC SURGERY

## 2023-05-02 RX ORDER — CHLORHEXIDINE GLUCONATE 4 G/100ML
SOLUTION TOPICAL DAILY PRN
Qty: 236 ML | Refills: 0 | Status: SHIPPED | OUTPATIENT
Start: 2023-05-02

## 2023-05-02 RX ORDER — PREGABALIN 75 MG/1
75 CAPSULE ORAL ONCE
OUTPATIENT
Start: 2023-05-02 | End: 2023-05-02

## 2023-05-02 RX ORDER — MELOXICAM 7.5 MG/1
15 TABLET ORAL ONCE
OUTPATIENT
Start: 2023-05-02 | End: 2023-05-02

## 2023-05-02 RX ORDER — ACETAMINOPHEN 325 MG/1
1000 TABLET ORAL ONCE
OUTPATIENT
Start: 2023-05-02 | End: 2023-05-02

## 2023-05-02 NOTE — PROGRESS NOTES
Orthopaedic Clinic Note: Knee New Patient    Chief Complaint   Patient presents with   • Left Knee - Pain   • Right Hip - Pain        HPI  Consult from: MD Stephani Oropeza Rg Tovar is a 67 y.o. female who presents with left knee pain for 3 year(s). Onset hiking . Pain is localized to the medial joint line, entire knee (globally) and is a 8/10 on the pain scale. Pain is described as dull, aching, throbbing, stabbing and shooting. Associated symptoms include pain, popping and stiffness. The pain is worse with walking, climbing stairs, sleeping and rising from seated position. Previous treatments have included: Anti-inflammatories, cortisone injection, viscosupplementation for 3 months duration or longer.  Last injection was November 2022.  Although some transient relief was reported with these interventions, these conservative measures have failed and symptoms have persisted. The patient is limited in daily activities and has had a significant decrease in quality of life as a result. She denies fevers, chills, or constitutional symptoms.    She is also complaining of lateral based right hip pain that has been ongoing for several months.  Pain is localized to the lateral trochanter and gluteal region.  Denies any pain in the groin.    I have reviewed the following portions of the patient's history:History of Present Illness    Past Medical History:   Diagnosis Date   • Arthritis    • Arthritis of neck 2009   • Breast injury 03/2017    RT BREAST HIT S/P FALL   • Cervical disc disorder 2009    Cervical   • Frozen shoulder 2009    Right shoulder   • Heartburn    • Hip arthrosis 2023    Right hip   • Hyperlipidemia    • Knee swelling 2022    Left knee   • Rotator cuff syndrome 2009    Right shoulder   • Scoliosis 8 years ago   • Tear of meniscus of knee 2013      Past Surgical History:   Procedure Laterality Date   • AUGMENTATION MAMMAPLASTY Bilateral 01/2017    EXPLANTED & REPLACED 04/20/2022   •  BREAST EXCISIONAL BIOPSY Left 1980   • FOOT SURGERY  2013    Bilateral bunionectomy . 3rd toe right foot tendon release   • HYSTERECTOMY  2004   • KNEE SURGERY  2010    Torn meniscus Left   • OOPHORECTOMY Bilateral 2004   • REDUCTION MAMMAPLASTY Bilateral 2013    WITH MASTOPEXY   • SHOULDER SURGERY  2009    Torn rotator cuff repair right      Family History   Problem Relation Age of Onset   • Arthritis Mother    • Hyperlipidemia Mother    • Osteoporosis Mother    • Hyperlipidemia Father    • Diabetes Father    • Brain cancer Father    • Hyperlipidemia Sister    • Hyperlipidemia Sister    • Hyperlipidemia Brother    • Coronary artery disease Brother    • Liver disease Brother    • Rheumatologic disease Maternal Aunt    • Ovarian cancer Neg Hx    • Breast cancer Neg Hx      Social History     Socioeconomic History   • Marital status:    Tobacco Use   • Smoking status: Never   • Smokeless tobacco: Never   Vaping Use   • Vaping Use: Never used   Substance and Sexual Activity   • Alcohol use: Yes     Alcohol/week: 2.0 standard drinks     Types: 2 Glasses of wine per week     Comment: 2-3 drinks per week   • Drug use: No   • Sexual activity: Not Currently     Partners: Male     Birth control/protection: None      Current Outpatient Medications on File Prior to Visit   Medication Sig Dispense Refill   • Ascorbic Acid (VITAMIN C PO) 1000mg once daily     • Coenzyme Q10 400 MG capsule Take 1 capsule by mouth Every Night. 90 capsule 3   • CREAM BASE EX Apply  topically to the appropriate area as directed. Apply topically Testosterone 0.3mg/progesterone 25mg/0.5ml TD daily in syringes OK TO FILL 4 MONTH SUPPLY .     • estradiol (MINIVELLE, VIVELLE-DOT) 0.05 MG/24HR patch 1 patch 2 (Two) Times a Week.     • fluocinonide (LIDEX) 0.05 % external solution Apply  topically to the appropriate area as directed 2 (Two) Times a Day. 60 mL 5   • loratadine (CLARITIN) 10 MG tablet Take 1 tablet by mouth Daily.     • metFORMIN  (GLUCOPHAGE) 500 MG tablet Take 2 tablets by mouth Daily With Dinner. 2 tablets every evening     • NON FORMULARY Indoplex w/DIM  1 capsule daily     • NON FORMULARY Reservatrol  Once daily     • Nutritional Supplements (DHEA PO) Pt takes 5mg daily     • omeprazole (priLOSEC) 40 MG capsule Take 1 capsule by mouth Daily. 90 capsule 1   • rosuvastatin (CRESTOR) 5 MG tablet Take 1 tablet by mouth 3 (Three) Times a Week.     • Semaglutide,0.25 or 0.5MG/DOS, (Ozempic, 0.25 or 0.5 MG/DOSE,) 2 MG/1.5ML solution pen-injector Inject 0.25 mg under the skin into the appropriate area as directed 1 (One) Time Per Week. 1.5 mL 5   • Triamcinolone Acetonide (NASACORT ALLERGY 24HR NA) Daily As Needed. 2 sprays in each nostril per day     • valACYclovir (VALTREX) 500 MG tablet Take 1 tablet by mouth Daily. Takes PRN       No current facility-administered medications on file prior to visit.      Allergies   Allergen Reactions   • Statins Myalgia     Other reaction(s): Myalgias (Muscle Pain)          Review of Systems   Constitutional: Negative for activity change, appetite change, chills, diaphoresis, fatigue, fever and unexpected weight change.   HENT: Negative for congestion, dental problem, drooling, ear discharge, ear pain, facial swelling, hearing loss, mouth sores, nosebleeds, postnasal drip, rhinorrhea, sinus pressure, sneezing, sore throat, tinnitus, trouble swallowing and voice change.    Eyes: Negative for photophobia, pain, discharge, redness, itching and visual disturbance.   Respiratory: Negative for apnea, cough, choking, chest tightness, shortness of breath, wheezing and stridor.    Cardiovascular: Negative for chest pain, palpitations and leg swelling.   Gastrointestinal: Negative for abdominal distention, abdominal pain, anal bleeding, blood in stool, constipation, diarrhea, nausea, rectal pain and vomiting.   Endocrine: Negative for cold intolerance, heat intolerance, polydipsia, polyphagia and polyuria.  "  Genitourinary: Negative for decreased urine volume, difficulty urinating, dysuria, enuresis, flank pain, frequency, genital sores, hematuria and urgency.   Musculoskeletal: Positive for arthralgias. Negative for back pain, gait problem, joint swelling, myalgias, neck pain and neck stiffness.   Skin: Negative for color change, pallor, rash and wound.   Allergic/Immunologic: Negative for environmental allergies, food allergies and immunocompromised state.   Neurological: Negative for dizziness, tremors, seizures, syncope, facial asymmetry, speech difficulty, weakness, light-headedness, numbness and headaches.   Hematological: Negative for adenopathy. Does not bruise/bleed easily.   Psychiatric/Behavioral: Negative for agitation, behavioral problems, confusion, decreased concentration, dysphoric mood, hallucinations, self-injury, sleep disturbance and suicidal ideas. The patient is not nervous/anxious and is not hyperactive.         The patient's Review of Systems was personally reviewed and confirmed as accurate.    The following portions of the patient's history were reviewed and updated as appropriate: allergies, current medications, past family history, past medical history, past social history, past surgical history and problem list.    Physical Exam  Blood pressure 144/88, height 164 cm (64.57\"), weight 88.1 kg (194 lb 3.2 oz), not currently breastfeeding.    Body mass index is 32.75 kg/m².    GENERAL APPEARANCE: awake, alert & oriented x 3, in no acute distress and well developed, well nourished  PSYCH: normal affect  LUNGS:  breathing nonlabored  EYES: sclera anicteric  CARDIOVASCULAR: palpable dorsalis pedis, palpable posterior tibial bilaterally. Capillary refill less than 2 seconds  EXTREMITIES: no clubbing, cyanosis  GAIT:  Antalgic            Right Lower Extremity Exam:   RANGE OF MOTION:   FLEXION CONTRACTURE: None   FLEXION: 110 degrees   INTERNAL ROTATION: 20 degrees at 90 degrees of flexion "   EXTERNAL ROTATION: 40 degrees at 90 degrees of flexion    PAIN WITH HIP MOTION: no  PAIN WITH LOGROLL: no  STINCHFIELD TEST: negative    KNEE EXAM: full knee ROM (0-120 degrees), stable to varus and valgus stress at terminal extension and 30 degrees flexion , moderate varus, correctable to neutral    STRENGTH:  5/5 hip adduction, abduction, flexion. 5/5 strength knee flexion, extension. 5/5 strength ankle dorsiflexion and plantarflexion.     GREATER TROCHANTER BURSAL PAIN:  yes     REFLEXES:   PATELLAR 2+/4   ACHILLES 2+/4    CLONUS: negative  STRAIGHT LEG TEST:   negative    SENSATION TO LIGHT TOUCH:  DEEP PERONEAL/SUPERFICIAL PERONEAL/SURAL/SAPHENOUS/TIBIAL:  intact    EDEMA:   no  ERYTHEMA:  no  WOUNDS/INCISIONS: no overlying skin problems.      Left Lower Extremity Exam:   ----------  Hip Exam  ----------  FLEXION CONTRACTURE: None  FLEXION: 110 degrees  INTERNAL ROTATION: 20 degrees at 90 degrees of flexion   EXTERNAL ROTATION: 40 degrees at 90 degrees of flexion    PAIN WITH HIP MOTION: no  ----------  Knee Exam  ----------  ALIGNMENT: severe varus, correctable to neutral    RANGE OF MOTION:  Decreased (3 - 120 degrees) with no extensor lag  LIGAMENTOUS STABILITY:   stable to varus and valgus stress at terminal extension and 30 degrees; retensioning of the MCL is appreciated with valgus stress at 30 degrees consistent with medial compartment degeneration     STRENGTH:  4/5 knee flexion, extension. 5/5 ankle dorsiflexion and plantarflexion.     PAIN WITH PALPATION: global  KNEE EFFUSION: yes, mild effusion  PAIN WITH KNEE ROM: yes, medially and anteriorly  PATELLAR CREPITUS: yes, painful and symptomatic  SPECIAL EXAM FINDINGS:  negative posterior drawer    REFLEXES:  PATELLAR 2+/4  ACHILLES 2+/4    CLONUS: no  STRAIGHT LEG TEST:   negative    SENSATION TO LIGHT TOUCH:  DEEP PERONEAL/SUPERFICIAL PERONEAL/SURAL/SAPHENOUS/TIBIAL:   intact    EDEMA:  no  ERYTHEMA:  no  WOUNDS/INCISIONS:   no      ______________________________________________________________________  ______________________________________________________________________    RADIOGRAPHIC FINDINGS:   Indication: Left knee pain, right hip pain    Comparison: No prior xrays are available for comparison    Left knee(s) 4 views: moderate to severe tricompartmental arthritis with genu varum alignment and bone-on-bone articulation medial compartment, periarticular osteophytes visualized in all compartments    AP pelvis, 2 views right hip: mild joint space narrowing, minimal osteophyte formation    MRI of the left knee from 9/21/2021 was personally interpreted.  MRI demonstrates moderate to severe tricompartmental osteoarthritis with genu varum alignment with subchondral edema in the medial compartment.  Chronic ACL tear.    Assessment/Plan:   Diagnosis Plan   1. Primary osteoarthritis of left knee  Case Request    CBC and Differential    Comprehensive metabolic panel    Protime-INR    APTT    Hemoglobin A1c    ECG 12 Lead    Nicotine & Metabolite, Quant    Tranexamic Acid 1,000 mg in sodium chloride 0.9 % 100 mL    Tranexamic Acid 1,000 mg in sodium chloride 0.9 % 100 mL    ethyl alcohol 62 % 2 each    ceFAZolin (ANCEF) 2 g in sodium chloride 0.9 % 100 mL IVPB    acetaminophen (TYLENOL) tablet 975 mg    meloxicam (MOBIC) tablet 15 mg    pregabalin (LYRICA) capsule 75 mg    Case Request      2. Left knee pain, unspecified chronicity  XR Knee 4+ View Left      3. Right hip pain  XR Hip With or Without Pelvis 2 - 3 View Right      4. Trochanteric bursitis of right hip          Patient suffering from trochanteric bursitis of the right hip.  This likely secondary to altered gait pattern due to her ongoing left knee pain.  In regards to her left knee, the patient has clinical and radiographic evidence of end-stage left knee joint degeneration. Conservative measures have been tried for 3 months or longer, but have failed to adequately treat or  improve the patient's symptoms. Pain is restricting the patient's daily activities as well as quality of life. The recommendation at this time is to proceed with a left total knee arthroplasty with the goal to improve patient function and pain. The risks, benefits, potential complications, and alternatives were discussed with the patient in detail. Risks included but were not limited to bleeding, infection, anesthesia risks, damage to neurovascular structures, osteolysis, aseptic loosening, instability, dislocation, pain, continued pain, iatrogenic fracture, possible need for future surgery including the potential for amputation, blood clots, myocardial infarction, stroke, and death. Radha-operative blood management and the potential for blood transfusion were discussed with risks and options clearly outlined. Specific details of the surgical procedure, hospitalization, recovery, rehabilitation, and long-term precautions were also presented. Pre-operative teaching was provided. Implant/prosthesis selection was outlined, and the many options available were explained; the final choice will be made at the time of the procedure to match the anatomy and condition of the bone, ligaments, tendons, and muscles. Given this instruction, the patient elected to proceed with the left total knee arthroplasty. The patient will be seen by pre-admission testing for pre-operative optimization and risk assessment and will be scheduled for surgery once this is completed.    The patient is considered standard risk for DVT based on patient risk factors and will be placed on aspirin postoperatively for DVT prophylaxis.      Jonas Lipscomb MD  05/02/23  12:03 EDT

## 2023-05-15 ENCOUNTER — PRE-ADMISSION TESTING (OUTPATIENT)
Dept: PREADMISSION TESTING | Facility: HOSPITAL | Age: 68
End: 2023-05-15
Payer: MEDICARE

## 2023-05-15 VITALS — HEIGHT: 65 IN | BODY MASS INDEX: 31.22 KG/M2 | WEIGHT: 187.39 LBS

## 2023-05-15 DIAGNOSIS — M17.12 PRIMARY OSTEOARTHRITIS OF LEFT KNEE: ICD-10-CM

## 2023-05-15 LAB
ALBUMIN SERPL-MCNC: 4.8 G/DL (ref 3.5–5.2)
ALBUMIN/GLOB SERPL: 2.5 G/DL
ALP SERPL-CCNC: 59 U/L (ref 39–117)
ALT SERPL W P-5'-P-CCNC: 23 U/L (ref 1–33)
ANION GAP SERPL CALCULATED.3IONS-SCNC: 11 MMOL/L (ref 5–15)
APTT PPP: 28.4 SECONDS (ref 22–39)
AST SERPL-CCNC: 23 U/L (ref 1–32)
BASOPHILS # BLD AUTO: 0.03 10*3/MM3 (ref 0–0.2)
BASOPHILS NFR BLD AUTO: 0.5 % (ref 0–1.5)
BILIRUB SERPL-MCNC: 0.6 MG/DL (ref 0–1.2)
BUN SERPL-MCNC: 22 MG/DL (ref 8–23)
BUN/CREAT SERPL: 28.2 (ref 7–25)
CALCIUM SPEC-SCNC: 9.9 MG/DL (ref 8.6–10.5)
CHLORIDE SERPL-SCNC: 104 MMOL/L (ref 98–107)
CO2 SERPL-SCNC: 25 MMOL/L (ref 22–29)
CREAT SERPL-MCNC: 0.78 MG/DL (ref 0.57–1)
DEPRECATED RDW RBC AUTO: 39.7 FL (ref 37–54)
EGFRCR SERPLBLD CKD-EPI 2021: 84.4 ML/MIN/1.73
EOSINOPHIL # BLD AUTO: 0.05 10*3/MM3 (ref 0–0.4)
EOSINOPHIL NFR BLD AUTO: 0.8 % (ref 0.3–6.2)
ERYTHROCYTE [DISTWIDTH] IN BLOOD BY AUTOMATED COUNT: 12 % (ref 12.3–15.4)
GLOBULIN UR ELPH-MCNC: 1.9 GM/DL
GLUCOSE SERPL-MCNC: 84 MG/DL (ref 65–99)
HBA1C MFR BLD: 5.1 % (ref 4.8–5.6)
HCT VFR BLD AUTO: 48.2 % (ref 34–46.6)
HGB BLD-MCNC: 15.1 G/DL (ref 12–15.9)
IMM GRANULOCYTES # BLD AUTO: 0.01 10*3/MM3 (ref 0–0.05)
IMM GRANULOCYTES NFR BLD AUTO: 0.2 % (ref 0–0.5)
INR PPP: 0.96 (ref 0.89–1.12)
LYMPHOCYTES # BLD AUTO: 1.62 10*3/MM3 (ref 0.7–3.1)
LYMPHOCYTES NFR BLD AUTO: 26.1 % (ref 19.6–45.3)
MCH RBC QN AUTO: 28.3 PG (ref 26.6–33)
MCHC RBC AUTO-ENTMCNC: 31.3 G/DL (ref 31.5–35.7)
MCV RBC AUTO: 90.3 FL (ref 79–97)
MONOCYTES # BLD AUTO: 0.59 10*3/MM3 (ref 0.1–0.9)
MONOCYTES NFR BLD AUTO: 9.5 % (ref 5–12)
NEUTROPHILS NFR BLD AUTO: 3.9 10*3/MM3 (ref 1.7–7)
NEUTROPHILS NFR BLD AUTO: 62.9 % (ref 42.7–76)
NRBC BLD AUTO-RTO: 0 /100 WBC (ref 0–0.2)
PLATELET # BLD AUTO: 252 10*3/MM3 (ref 140–450)
PMV BLD AUTO: 9.1 FL (ref 6–12)
POTASSIUM SERPL-SCNC: 4.6 MMOL/L (ref 3.5–5.2)
PROT SERPL-MCNC: 6.7 G/DL (ref 6–8.5)
PROTHROMBIN TIME: 12.8 SECONDS (ref 12.2–14.5)
RBC # BLD AUTO: 5.34 10*6/MM3 (ref 3.77–5.28)
SODIUM SERPL-SCNC: 140 MMOL/L (ref 136–145)
WBC NRBC COR # BLD: 6.2 10*3/MM3 (ref 3.4–10.8)

## 2023-05-15 PROCEDURE — G0480 DRUG TEST DEF 1-7 CLASSES: HCPCS

## 2023-05-15 PROCEDURE — 83036 HEMOGLOBIN GLYCOSYLATED A1C: CPT

## 2023-05-15 PROCEDURE — 85025 COMPLETE CBC W/AUTO DIFF WBC: CPT

## 2023-05-15 PROCEDURE — 80053 COMPREHEN METABOLIC PANEL: CPT

## 2023-05-15 PROCEDURE — 36415 COLL VENOUS BLD VENIPUNCTURE: CPT

## 2023-05-15 PROCEDURE — 85730 THROMBOPLASTIN TIME PARTIAL: CPT

## 2023-05-15 PROCEDURE — 85610 PROTHROMBIN TIME: CPT

## 2023-05-15 NOTE — PAT
ekg on chart from 4/18/23.     Survey completed in [employer].     Pt had lab that she wanted drawn today from other provider- pat nurse had to call reference with lab to verify correct lab ordered since send out- lab reference states ordered what [facility] could run. Informed patient. Lab order on chart.     Clean catch urinalysis not indicated because patient denied recent urinary frequency, urinary urgency, burning/pain upon urination, or flank pain. No recent UTIs.    Patient viewed general PAT education video as instructed in their preoperative information received from their surgeon.  Patient stated the general PAT education video was viewed in its entirety and survey completed.  Copies of [facility] general education handouts (Incentive Spirometry, Meds to Beds Program, Patient Belongings, Pre-op skin preparation instructions, Blood Glucose testing, Visitor policy, Surgery FAQ, Code H) distributed to patient if not printed. Education related to the PAT pass and skin preparation for surgery (if applicable) completed in [facility] as a reinforcement to PAT education video. Patient instructed to return PAT pass provided today as well as completed skin preparation sheet (if applicable) on the day of procedure.     Additionally if patient had not viewed video yet but intended to view it at home or in our waiting area, then referred them to the handout with QR code/link provided during PAT visit.  Instructed patient to complete survey after viewing the video in its entirety.  Encouraged patient/family to read [facility] general education handouts thoroughly and notify PAT staff with any questions or concerns. Patient verbalized understanding of all information and priority content.    Prescription for Chlorhexidine shower called into patient's pharmacy or [facility] pharmacy by patient's surgeon.  Reinforced with patient to  the prescription from applicable pharmacy if they haven't already.  Verbal and written instructions given regarding  proper use of Chlorhexidine body wash to patient and/or famlily during PAT visit. Patient/family also instructed to complete checklist and return it to Pre-op on the day of surgery.  Patient and/or family verbalized understanding.    Patient to apply Chlorhexadine wipes  to surgical area (as instructed) the night before procedure and the AM of procedure. Wipes provided.    Patient instructed to drink 20 ounces of Gatorade and it needs to be completed 1 hour (for Main OR patients) or 2 hours ([facility] before given arrival time for procedure (NO RED Gatorade)    Patient verbalized understanding.    It was noted during Pre Admission Testing that patient was wearing some form of fingernail polish (gel/regular) and/or acrylic/artificial nails.  Patient was told that polish and/or artificial nails must be removed for surgery.  If a patient had recent manicure, and would rather not remove polish or artificial nails. Then the minimum requirement is that the polish/artificial nails must be removed from the middle finger on each hand.  Patient verbalized understanding.    If patient was having surgery on an upper extremity, then the patient was instructed that fingernail polish/artificial fingernails must be removed for surgery.  NO EXCEPTIONS.  Patient verbalized understanding.    If patient was having surgery on a lower extremity, then the patient was instructed that toenail polish on both extremities must be removed for surgery.  NO EXCEPTIONS. Patient verbalized understanding.    Patient instructed to remove all jewelry for procedure.  Patient was instructed that if unable to remove jewelry especially rings to request assistance from a jeweler. Explained that if the piece of jewelry could not be removed before arrival to preop that it will be cut off.  Reinforced with patient that all jewelry must be removed for safety reasons that taping a ring was not an option.  Patient  verbalized understanding.      Discussed with patient options for receiving total joint replacement education and assessed patient's ability and preference. Joint Replacement Guide given to patient during PAT visit since not received a copy within the last year. Encouraged patient/family to read guide thoroughly and notify PAT staff with any questions or concerns. Handout provided directing patient to links to watch online videos related to joint replacement surgery on the [facility] website. The handout gives detailed instructions for joining an online joint replacement class through Zoom or phone conference offered on Thursdays. Patient agreed to participate by watching videos online. Patient verbalized understanding of instructions and to complete the online learning tool survey. Encouraged to share information with family and/or . An overview of the joint replacement education was provided during the visit including general perioperative instructions that are routine for all surgical patients (PAT PASS, wipes, directions to pre-op, etc.).

## 2023-05-19 LAB
COTININE SERPL-MCNC: <1 NG/ML
NICOTINE SERPL-MCNC: <1 NG/ML

## 2023-05-22 RX ORDER — SEMAGLUTIDE 1.34 MG/ML
0.5 INJECTION, SOLUTION SUBCUTANEOUS WEEKLY
Qty: 1.5 ML | Refills: 5 | Status: SHIPPED | OUTPATIENT
Start: 2023-05-22

## 2023-05-22 RX ORDER — SEMAGLUTIDE 1.34 MG/ML
0.25 INJECTION, SOLUTION SUBCUTANEOUS WEEKLY
Qty: 1.5 ML | Refills: 5 | Status: CANCELLED | OUTPATIENT
Start: 2023-05-22

## 2023-05-22 NOTE — TELEPHONE ENCOUNTER
Caller: Stephani Bhardwaj    Relationship: Self    Best call back number: [phone]    Requested Prescriptions:   Requested Prescriptions     Pending Prescriptions Disp Refills   • Semaglutide,0.25 or 0.5MG/DOS, (Ozempic, 0.25 or 0.5 MG/DOSE,) 2 MG/1.5ML solution pen-injector 1.5 mL 5     Sig: Inject 0.25 mg under the skin into the appropriate area as directed 1 (One) Time Per Week.        Pharmacy where request should be sent: [id number] - [place], [place] - [address] [facility] [address] [phone] [facility] [phone] FX     Last office visit with prescribing clinician: 4/18/2023   Last telemedicine visit with prescribing clinician: 4/20/2023   Next office visit with prescribing clinician: 10/19/2023     Additional details provided by patient: PATIENT HAS CALLED REQUESTING A NEW PRESCRIPTION FOR THE 0.5 DOSAGE OF ABOVE MEDICATION.    Does the patient have less than a 3 day supply:  [x] Yes  [] No    Would you like a call back once the refill request has been completed: [] Yes [x] No    If the office needs to give you a call back, can they leave a voicemail: [] Yes [x] No    Catracho Mae   05/22/23 09:33 EDT

## 2023-05-24 ENCOUNTER — ANESTHESIA EVENT CONVERTED (OUTPATIENT)
Dept: ANESTHESIOLOGY | Facility: HOSPITAL | Age: 68
End: 2023-05-24
Payer: COMMERCIAL

## 2023-05-24 ENCOUNTER — APPOINTMENT (OUTPATIENT)
Dept: GENERAL RADIOLOGY | Facility: HOSPITAL | Age: 68
End: 2023-05-24
Payer: COMMERCIAL

## 2023-05-24 ENCOUNTER — ANESTHESIA (OUTPATIENT)
Dept: PERIOP | Facility: HOSPITAL | Age: 68
End: 2023-05-24
Payer: COMMERCIAL

## 2023-05-24 ENCOUNTER — ANESTHESIA EVENT (OUTPATIENT)
Dept: PERIOP | Facility: HOSPITAL | Age: 68
End: 2023-05-24
Payer: COMMERCIAL

## 2023-05-24 ENCOUNTER — HOSPITAL ENCOUNTER (OUTPATIENT)
Facility: HOSPITAL | Age: 68
Discharge: HOME | End: 2023-05-24
Attending: ORTHOPAEDIC SURGERY | Admitting: ORTHOPAEDIC SURGERY
Payer: COMMERCIAL

## 2023-05-24 VITALS
HEART RATE: 72 BPM | OXYGEN SATURATION: 94 % | BODY MASS INDEX: 31.22 KG/M2 | DIASTOLIC BLOOD PRESSURE: 75 MMHG | HEIGHT: 65 IN | TEMPERATURE: 97.8 F | WEIGHT: 187.39 LBS | SYSTOLIC BLOOD PRESSURE: 123 MMHG | RESPIRATION RATE: 16 BRPM

## 2023-05-24 DIAGNOSIS — Z96.652 S/P TOTAL KNEE ARTHROPLASTY, LEFT: Primary | ICD-10-CM

## 2023-05-24 DIAGNOSIS — M17.12 PRIMARY OSTEOARTHRITIS OF LEFT KNEE: ICD-10-CM

## 2023-05-24 LAB — GLUCOSE BLDC GLUCOMTR-MCNC: 84 MG/DL (ref 70–130)

## 2023-05-24 PROCEDURE — C1755 CATHETER, INTRASPINAL: HCPCS | Performed by: ORTHOPAEDIC SURGERY

## 2023-05-24 PROCEDURE — 25010000002 ROPIVACAINE PER 1 MG: Performed by: NURSE ANESTHETIST, CERTIFIED REGISTERED

## 2023-05-24 PROCEDURE — 82948 REAGENT STRIP/BLOOD GLUCOSE: CPT

## 2023-05-24 PROCEDURE — C1776 JOINT DEVICE (IMPLANTABLE): HCPCS | Performed by: ORTHOPAEDIC SURGERY

## 2023-05-24 PROCEDURE — 25010000002 DROPERIDOL PER 5 MG

## 2023-05-24 PROCEDURE — 25010000002 CEFAZOLIN IN DEXTROSE 2-4 GM/100ML-% SOLUTION: Performed by: ORTHOPAEDIC SURGERY

## 2023-05-24 PROCEDURE — G0378 HOSPITAL OBSERVATION PER HR: HCPCS

## 2023-05-24 PROCEDURE — 25010000002 ROPIVACAINE PER 1 MG: Performed by: ORTHOPAEDIC SURGERY

## 2023-05-24 PROCEDURE — 25010000002 ONDANSETRON PER 1 MG: Performed by: NURSE ANESTHETIST, CERTIFIED REGISTERED

## 2023-05-24 PROCEDURE — 25010000002 KETOROLAC TROMETHAMINE PER 15 MG: Performed by: ORTHOPAEDIC SURGERY

## 2023-05-24 PROCEDURE — 25010000002 DEXAMETHASONE PER 1 MG: Performed by: NURSE ANESTHETIST, CERTIFIED REGISTERED

## 2023-05-24 PROCEDURE — 97161 PT EVAL LOW COMPLEX 20 MIN: CPT

## 2023-05-24 PROCEDURE — 73560 X-RAY EXAM OF KNEE 1 OR 2: CPT

## 2023-05-24 PROCEDURE — 97110 THERAPEUTIC EXERCISES: CPT

## 2023-05-24 PROCEDURE — 25010000002 PROPOFOL 10 MG/ML EMULSION: Performed by: NURSE ANESTHETIST, CERTIFIED REGISTERED

## 2023-05-24 PROCEDURE — 25010000002 ONDANSETRON PER 1 MG

## 2023-05-24 PROCEDURE — 25010000002 MORPHINE PER 10 MG: Performed by: ORTHOPAEDIC SURGERY

## 2023-05-24 DEVICE — COMP FEM TRIATH CR CMTLS SZ4 LT: Type: IMPLANTABLE DEVICE | Site: KNEE | Status: FUNCTIONAL

## 2023-05-24 DEVICE — DEV CONTRL TISS STRATAFIX SPIRAL PDO BIDIR 1 36X36CM: Type: IMPLANTABLE DEVICE | Site: KNEE | Status: FUNCTIONAL

## 2023-05-24 DEVICE — PAT TRIATH TRITANIUM 32X36X10MM: Type: IMPLANTABLE DEVICE | Site: KNEE | Status: FUNCTIONAL

## 2023-05-24 DEVICE — CAP TOTAL KN CMTLS 4 PC: Type: IMPLANTABLE DEVICE | Site: KNEE | Status: FUNCTIONAL

## 2023-05-24 DEVICE — BASEPLT TIB TRIATH TRITANIUM SZ5: Type: IMPLANTABLE DEVICE | Site: KNEE | Status: FUNCTIONAL

## 2023-05-24 DEVICE — INSRT TIB/KN TRIATHLON CONDY/STBL X3 SZ5 9MM: Type: IMPLANTABLE DEVICE | Site: KNEE | Status: FUNCTIONAL

## 2023-05-24 RX ORDER — DOCUSATE SODIUM 100 MG/1
100 CAPSULE, LIQUID FILLED ORAL 2 TIMES DAILY
Qty: 60 CAPSULE | Refills: 0 | Status: SHIPPED | OUTPATIENT
Start: 2023-05-24

## 2023-05-24 RX ORDER — CEFAZOLIN SODIUM 2 G/100ML
2 INJECTION, SOLUTION INTRAVENOUS ONCE
Status: COMPLETED | OUTPATIENT
Start: 2023-05-24 | End: 2023-05-24

## 2023-05-24 RX ORDER — SODIUM CHLORIDE 0.9 % (FLUSH) 0.9 %
3 SYRINGE (ML) INJECTION EVERY 12 HOURS SCHEDULED
Status: DISCONTINUED | OUTPATIENT
Start: 2023-05-24 | End: 2023-05-24 | Stop reason: HOSPADM

## 2023-05-24 RX ORDER — PROPOFOL 10 MG/ML
VIAL (ML) INTRAVENOUS AS NEEDED
Status: DISCONTINUED | OUTPATIENT
Start: 2023-05-24 | End: 2023-05-24 | Stop reason: SURG

## 2023-05-24 RX ORDER — ONDANSETRON 2 MG/ML
INJECTION INTRAMUSCULAR; INTRAVENOUS AS NEEDED
Status: DISCONTINUED | OUTPATIENT
Start: 2023-05-24 | End: 2023-05-24 | Stop reason: SURG

## 2023-05-24 RX ORDER — LIDOCAINE HYDROCHLORIDE 10 MG/ML
0.5 INJECTION, SOLUTION EPIDURAL; INFILTRATION; INTRACAUDAL; PERINEURAL ONCE AS NEEDED
Status: COMPLETED | OUTPATIENT
Start: 2023-05-24 | End: 2023-05-24

## 2023-05-24 RX ORDER — ACETAMINOPHEN 500 MG
1000 TABLET ORAL EVERY 8 HOURS
Qty: 42 TABLET | Refills: 0 | Status: SHIPPED | OUTPATIENT
Start: 2023-05-24

## 2023-05-24 RX ORDER — FENTANYL CITRATE 50 UG/ML
50 INJECTION, SOLUTION INTRAMUSCULAR; INTRAVENOUS
Status: DISCONTINUED | OUTPATIENT
Start: 2023-05-24 | End: 2023-05-24 | Stop reason: HOSPADM

## 2023-05-24 RX ORDER — ACETAMINOPHEN 500 MG
1000 TABLET ORAL EVERY 8 HOURS
Status: DISCONTINUED | OUTPATIENT
Start: 2023-05-24 | End: 2023-05-24 | Stop reason: HOSPADM

## 2023-05-24 RX ORDER — MIDAZOLAM HYDROCHLORIDE 1 MG/ML
0.5 INJECTION INTRAMUSCULAR; INTRAVENOUS
Status: DISCONTINUED | OUTPATIENT
Start: 2023-05-24 | End: 2023-05-24 | Stop reason: HOSPADM

## 2023-05-24 RX ORDER — SODIUM CHLORIDE 0.9 % (FLUSH) 0.9 %
10 SYRINGE (ML) INJECTION EVERY 12 HOURS SCHEDULED
Status: CANCELLED | OUTPATIENT
Start: 2023-05-24

## 2023-05-24 RX ORDER — ONDANSETRON 2 MG/ML
4 INJECTION INTRAMUSCULAR; INTRAVENOUS EVERY 6 HOURS PRN
Status: DISCONTINUED | OUTPATIENT
Start: 2023-05-24 | End: 2023-05-24 | Stop reason: HOSPADM

## 2023-05-24 RX ORDER — LABETALOL HYDROCHLORIDE 5 MG/ML
10 INJECTION, SOLUTION INTRAVENOUS EVERY 4 HOURS PRN
Status: DISCONTINUED | OUTPATIENT
Start: 2023-05-24 | End: 2023-05-24 | Stop reason: HOSPADM

## 2023-05-24 RX ORDER — BUPIVACAINE HYDROCHLORIDE 5 MG/ML
INJECTION, SOLUTION PERINEURAL
Status: COMPLETED | OUTPATIENT
Start: 2023-05-24 | End: 2023-05-24

## 2023-05-24 RX ORDER — ROSUVASTATIN CALCIUM 10 MG/1
5 TABLET, COATED ORAL 3 TIMES WEEKLY
Status: DISCONTINUED | OUTPATIENT
Start: 2023-05-24 | End: 2023-05-24 | Stop reason: HOSPADM

## 2023-05-24 RX ORDER — PREGABALIN 75 MG/1
75 CAPSULE ORAL ONCE
Status: COMPLETED | OUTPATIENT
Start: 2023-05-24 | End: 2023-05-24

## 2023-05-24 RX ORDER — ASPIRIN 81 MG/1
81 TABLET ORAL EVERY 12 HOURS SCHEDULED
Status: DISCONTINUED | OUTPATIENT
Start: 2023-05-25 | End: 2023-05-24 | Stop reason: HOSPADM

## 2023-05-24 RX ORDER — MELOXICAM 15 MG/1
15 TABLET ORAL DAILY
Qty: 10 TABLET | Refills: 0 | Status: SHIPPED | OUTPATIENT
Start: 2023-05-25

## 2023-05-24 RX ORDER — CEFAZOLIN SODIUM 2 G/100ML
2 INJECTION, SOLUTION INTRAVENOUS EVERY 8 HOURS
Status: DISCONTINUED | OUTPATIENT
Start: 2023-05-24 | End: 2023-05-24 | Stop reason: HOSPADM

## 2023-05-24 RX ORDER — OXYCODONE HYDROCHLORIDE 5 MG/1
5 TABLET ORAL EVERY 4 HOURS PRN
Qty: 40 TABLET | Refills: 0 | Status: SHIPPED | OUTPATIENT
Start: 2023-05-24

## 2023-05-24 RX ORDER — SODIUM CHLORIDE 0.9 % (FLUSH) 0.9 %
10 SYRINGE (ML) INJECTION AS NEEDED
Status: CANCELLED | OUTPATIENT
Start: 2023-05-24

## 2023-05-24 RX ORDER — MELOXICAM 15 MG/1
15 TABLET ORAL ONCE
Status: COMPLETED | OUTPATIENT
Start: 2023-05-24 | End: 2023-05-24

## 2023-05-24 RX ORDER — ROPIVACAINE HYDROCHLORIDE 2 MG/ML
INJECTION, SOLUTION EPIDURAL; INFILTRATION; PERINEURAL CONTINUOUS
Status: DISCONTINUED | OUTPATIENT
Start: 2023-05-24 | End: 2023-05-24 | Stop reason: HOSPADM

## 2023-05-24 RX ORDER — OXYCODONE HYDROCHLORIDE 5 MG/1
5 TABLET ORAL EVERY 4 HOURS PRN
Status: DISCONTINUED | OUTPATIENT
Start: 2023-05-24 | End: 2023-05-24 | Stop reason: HOSPADM

## 2023-05-24 RX ORDER — FAMOTIDINE 10 MG/ML
20 INJECTION, SOLUTION INTRAVENOUS ONCE
Status: CANCELLED | OUTPATIENT
Start: 2023-05-24 | End: 2023-05-24

## 2023-05-24 RX ORDER — ASPIRIN 81 MG/1
81 TABLET ORAL EVERY 12 HOURS SCHEDULED
Qty: 60 TABLET | Refills: 0 | Status: SHIPPED | OUTPATIENT
Start: 2023-05-25

## 2023-05-24 RX ORDER — ACETAMINOPHEN 500 MG
1000 TABLET ORAL ONCE
Status: COMPLETED | OUTPATIENT
Start: 2023-05-24 | End: 2023-05-24

## 2023-05-24 RX ORDER — FAMOTIDINE 20 MG/1
20 TABLET, FILM COATED ORAL ONCE
Status: COMPLETED | OUTPATIENT
Start: 2023-05-24 | End: 2023-05-24

## 2023-05-24 RX ORDER — MELOXICAM 15 MG/1
15 TABLET ORAL DAILY
Status: DISCONTINUED | OUTPATIENT
Start: 2023-05-25 | End: 2023-05-24 | Stop reason: HOSPADM

## 2023-05-24 RX ORDER — DROPERIDOL 2.5 MG/ML
INJECTION, SOLUTION INTRAMUSCULAR; INTRAVENOUS
Status: COMPLETED
Start: 2023-05-24 | End: 2023-05-24

## 2023-05-24 RX ORDER — DROPERIDOL 2.5 MG/ML
0.62 INJECTION, SOLUTION INTRAMUSCULAR; INTRAVENOUS ONCE
Status: COMPLETED | OUTPATIENT
Start: 2023-05-24 | End: 2023-05-24

## 2023-05-24 RX ORDER — SODIUM CHLORIDE 9 MG/ML
40 INJECTION, SOLUTION INTRAVENOUS AS NEEDED
Status: CANCELLED | OUTPATIENT
Start: 2023-05-24

## 2023-05-24 RX ORDER — TRANEXAMIC ACID 10 MG/ML
1000 INJECTION, SOLUTION INTRAVENOUS ONCE
Status: COMPLETED | OUTPATIENT
Start: 2023-05-24 | End: 2023-05-24

## 2023-05-24 RX ORDER — DEXMEDETOMIDINE HYDROCHLORIDE 100 UG/ML
INJECTION, SOLUTION INTRAVENOUS AS NEEDED
Status: DISCONTINUED | OUTPATIENT
Start: 2023-05-24 | End: 2023-05-24 | Stop reason: SURG

## 2023-05-24 RX ORDER — ONDANSETRON 4 MG/1
4 TABLET, FILM COATED ORAL EVERY 6 HOURS PRN
Status: DISCONTINUED | OUTPATIENT
Start: 2023-05-24 | End: 2023-05-24 | Stop reason: HOSPADM

## 2023-05-24 RX ORDER — MAGNESIUM HYDROXIDE 1200 MG/15ML
LIQUID ORAL AS NEEDED
Status: DISCONTINUED | OUTPATIENT
Start: 2023-05-24 | End: 2023-05-24 | Stop reason: HOSPADM

## 2023-05-24 RX ORDER — SODIUM CHLORIDE 9 MG/ML
100 INJECTION, SOLUTION INTRAVENOUS CONTINUOUS
Status: DISCONTINUED | OUTPATIENT
Start: 2023-05-24 | End: 2023-05-24 | Stop reason: HOSPADM

## 2023-05-24 RX ORDER — HYDROMORPHONE HYDROCHLORIDE 1 MG/ML
0.5 INJECTION, SOLUTION INTRAMUSCULAR; INTRAVENOUS; SUBCUTANEOUS
Status: DISCONTINUED | OUTPATIENT
Start: 2023-05-24 | End: 2023-05-24 | Stop reason: HOSPADM

## 2023-05-24 RX ORDER — SODIUM CHLORIDE 0.9 % (FLUSH) 0.9 %
3-10 SYRINGE (ML) INJECTION AS NEEDED
Status: DISCONTINUED | OUTPATIENT
Start: 2023-05-24 | End: 2023-05-24 | Stop reason: HOSPADM

## 2023-05-24 RX ORDER — ONDANSETRON 2 MG/ML
4 INJECTION INTRAMUSCULAR; INTRAVENOUS EVERY 6 HOURS PRN
Status: DISCONTINUED | OUTPATIENT
Start: 2023-05-24 | End: 2023-05-24 | Stop reason: SDUPTHER

## 2023-05-24 RX ORDER — ONDANSETRON 2 MG/ML
INJECTION INTRAMUSCULAR; INTRAVENOUS
Status: COMPLETED
Start: 2023-05-24 | End: 2023-05-24

## 2023-05-24 RX ORDER — ONDANSETRON 2 MG/ML
4 INJECTION INTRAMUSCULAR; INTRAVENOUS ONCE AS NEEDED
Status: COMPLETED | OUTPATIENT
Start: 2023-05-24 | End: 2023-05-24

## 2023-05-24 RX ORDER — OXYCODONE HYDROCHLORIDE 5 MG/1
10 TABLET ORAL EVERY 4 HOURS PRN
Status: DISCONTINUED | OUTPATIENT
Start: 2023-05-24 | End: 2023-05-24 | Stop reason: HOSPADM

## 2023-05-24 RX ORDER — DEXAMETHASONE SODIUM PHOSPHATE 4 MG/ML
INJECTION, SOLUTION INTRA-ARTICULAR; INTRALESIONAL; INTRAMUSCULAR; INTRAVENOUS; SOFT TISSUE AS NEEDED
Status: DISCONTINUED | OUTPATIENT
Start: 2023-05-24 | End: 2023-05-24 | Stop reason: SURG

## 2023-05-24 RX ORDER — MIDAZOLAM HYDROCHLORIDE 1 MG/ML
1 INJECTION INTRAMUSCULAR; INTRAVENOUS
Status: DISCONTINUED | OUTPATIENT
Start: 2023-05-24 | End: 2023-05-24 | Stop reason: HOSPADM

## 2023-05-24 RX ORDER — SODIUM CHLORIDE, SODIUM LACTATE, POTASSIUM CHLORIDE, CALCIUM CHLORIDE 600; 310; 30; 20 MG/100ML; MG/100ML; MG/100ML; MG/100ML
9 INJECTION, SOLUTION INTRAVENOUS CONTINUOUS
Status: DISCONTINUED | OUTPATIENT
Start: 2023-05-24 | End: 2023-05-24 | Stop reason: HOSPADM

## 2023-05-24 RX ORDER — LIDOCAINE HYDROCHLORIDE 10 MG/ML
INJECTION, SOLUTION EPIDURAL; INFILTRATION; INTRACAUDAL; PERINEURAL AS NEEDED
Status: DISCONTINUED | OUTPATIENT
Start: 2023-05-24 | End: 2023-05-24 | Stop reason: SURG

## 2023-05-24 RX ORDER — BUPIVACAINE HYDROCHLORIDE 2.5 MG/ML
INJECTION, SOLUTION EPIDURAL; INFILTRATION; INTRACAUDAL
Status: COMPLETED | OUTPATIENT
Start: 2023-05-24 | End: 2023-05-24

## 2023-05-24 RX ORDER — PANTOPRAZOLE SODIUM 40 MG/1
40 TABLET, DELAYED RELEASE ORAL
Status: DISCONTINUED | OUTPATIENT
Start: 2023-05-25 | End: 2023-05-24 | Stop reason: HOSPADM

## 2023-05-24 RX ORDER — ROPIVACAINE HYDROCHLORIDE 2 MG/ML
1 INJECTION, SOLUTION EPIDURAL; INFILTRATION; PERINEURAL CONTINUOUS
Start: 2023-05-24

## 2023-05-24 RX ORDER — NALOXONE HCL 0.4 MG/ML
0.1 VIAL (ML) INJECTION
Status: DISCONTINUED | OUTPATIENT
Start: 2023-05-24 | End: 2023-05-24 | Stop reason: HOSPADM

## 2023-05-24 RX ADMIN — PROPOFOL 35 MCG/KG/MIN: 10 INJECTION, EMULSION INTRAVENOUS at 09:37

## 2023-05-24 RX ADMIN — CEFAZOLIN SODIUM 2 G: 2 INJECTION, SOLUTION INTRAVENOUS at 17:26

## 2023-05-24 RX ADMIN — Medication 1000 MG: at 12:09

## 2023-05-24 RX ADMIN — DEXMEDETOMIDINE HYDROCHLORIDE 12 MCG: 100 INJECTION, SOLUTION INTRAVENOUS at 09:37

## 2023-05-24 RX ADMIN — SODIUM CHLORIDE 100 ML/HR: 9 INJECTION, SOLUTION INTRAVENOUS at 15:48

## 2023-05-24 RX ADMIN — ACETAMINOPHEN 1000 MG: 500 TABLET ORAL at 16:00

## 2023-05-24 RX ADMIN — TRANEXAMIC ACID 1000 MG: 10 INJECTION, SOLUTION INTRAVENOUS at 09:40

## 2023-05-24 RX ADMIN — SODIUM CHLORIDE, POTASSIUM CHLORIDE, SODIUM LACTATE AND CALCIUM CHLORIDE: 600; 310; 30; 20 INJECTION, SOLUTION INTRAVENOUS at 11:26

## 2023-05-24 RX ADMIN — SODIUM CHLORIDE, POTASSIUM CHLORIDE, SODIUM LACTATE AND CALCIUM CHLORIDE 9 ML/HR: 600; 310; 30; 20 INJECTION, SOLUTION INTRAVENOUS at 09:06

## 2023-05-24 RX ADMIN — BUPIVACAINE HYDROCHLORIDE 2 ML: 5 INJECTION, SOLUTION PERINEURAL at 09:35

## 2023-05-24 RX ADMIN — PREGABALIN 75 MG: 75 CAPSULE ORAL at 09:05

## 2023-05-24 RX ADMIN — DROPERIDOL 0.62 MG: 2.5 INJECTION, SOLUTION INTRAMUSCULAR; INTRAVENOUS at 14:57

## 2023-05-24 RX ADMIN — TRANEXAMIC ACID 1000 MG: 10 INJECTION, SOLUTION INTRAVENOUS at 10:45

## 2023-05-24 RX ADMIN — ACETAMINOPHEN 1000 MG: 500 TABLET ORAL at 09:05

## 2023-05-24 RX ADMIN — CEFAZOLIN SODIUM 2 G: 2 INJECTION, SOLUTION INTRAVENOUS at 09:33

## 2023-05-24 RX ADMIN — ONDANSETRON 4 MG: 2 INJECTION INTRAMUSCULAR; INTRAVENOUS at 11:27

## 2023-05-24 RX ADMIN — LIDOCAINE HYDROCHLORIDE 50 MG: 10 INJECTION, SOLUTION EPIDURAL; INFILTRATION; INTRACAUDAL; PERINEURAL at 09:33

## 2023-05-24 RX ADMIN — PROPOFOL 40 MG: 10 INJECTION, EMULSION INTRAVENOUS at 09:33

## 2023-05-24 RX ADMIN — DEXMEDETOMIDINE HYDROCHLORIDE 8 MCG: 100 INJECTION, SOLUTION INTRAVENOUS at 09:45

## 2023-05-24 RX ADMIN — MELOXICAM 15 MG: 15 TABLET ORAL at 09:05

## 2023-05-24 RX ADMIN — FAMOTIDINE 20 MG: 20 TABLET ORAL at 09:05

## 2023-05-24 RX ADMIN — LIDOCAINE HYDROCHLORIDE 0.5 ML: 10 INJECTION, SOLUTION EPIDURAL; INFILTRATION; INTRACAUDAL; PERINEURAL at 09:05

## 2023-05-24 RX ADMIN — ONDANSETRON 4 MG: 2 INJECTION INTRAMUSCULAR; INTRAVENOUS at 13:33

## 2023-05-24 RX ADMIN — DEXAMETHASONE SODIUM PHOSPHATE 8 MG: 4 INJECTION, SOLUTION INTRAMUSCULAR; INTRAVENOUS at 09:40

## 2023-05-24 RX ADMIN — BUPIVACAINE HYDROCHLORIDE 20 ML: 2.5 INJECTION, SOLUTION EPIDURAL; INFILTRATION; INTRACAUDAL at 11:47

## 2023-05-24 RX ADMIN — DEXMEDETOMIDINE HYDROCHLORIDE 8 MCG: 100 INJECTION, SOLUTION INTRAVENOUS at 11:12

## 2023-05-24 NOTE — ANESTHESIA PROCEDURE NOTES
Spinal Block      Patient reassessed immediately prior to procedure    Patient location during procedure: OR  Indication:at surgeon's request  Performed By  CRNA/CAA: Chris Ramirez CRNA  Preanesthetic Checklist  Completed: patient identified, IV checked, site marked, risks and benefits discussed, surgical consent, monitors and equipment checked, pre-op evaluation and timeout performed  Spinal Block Prep:  Patient Position:sitting  Sterile Tech:cap, gloves, sterile barriers and mask  Prep:Chloraprep  Patient Monitoring:blood pressure monitoring, continuous pulse oximetry and EKG    Spinal Block Procedure  Approach:midline  Guidance:landmark technique and palpation technique  Location:L4-L5  Needle Type:Ana Maria  Needle Gauge:25 G  Placement of Spinal needle event:cerebrospinal fluid aspirated  Paresthesia: no  Fluid Appearance:clear  Medications: bupivacaine (MARCAINE) 0.5 % injection - Injection   2 mL - 5/24/2023 9:35:00 AM   Post Assessment  Patient Tolerance:patient tolerated the procedure well with no apparent complications  Complications no  Additional Notes  Procedure:  Pt assisted to sitting position, with legs in position of comfort over side of bed.  Pt. instructed in optimal spine presentation, the spine was prepped/ Draped and the skin at insertion site was anesthetized with 1% Lidocaine 2 ml.  The spinal needle was then advanced until CSF flow was obtained and LA was injected:

## 2023-05-24 NOTE — OP NOTE
OPERATIVE REPORT     DATE OF PROCEDURE: 5/24/2023    SURGEON: Jonas Lipscomb M.D.     ASSISTANT(S): Circulator: Jalen Akins RN; Eitan Narvaez RN; Magy Diaz RN  Scrub Person: Kelly Graham RN  Vendor Representative: Jacob Bautista  Nursing Assistant: Luis Arnett PCT  Assistant: Dmitriy Lopez PA-C  Assistant: Dmitriy Lopez PA-C    Note-PA was utilized during the case to facilitate positioning the patient, exposure, retraction, placement of final components and definitive closure.    PREOPERATIVE DIAGNOSIS: Advanced degenerative joint disease of the left knee secondary to osteoarthritis    POSTOPERATIVE DIAGNOSIS: same     PROCEDURE: Left total Knee Arthroplasty     SURGICAL DETAILS:     APPROACH: Medial parapatellar     ANESTHESIA: Spinal plus local periarticular block    PREOPERATIVE ANTIBIOTICS: Ancef 2 g IV    TRANEXAMIC ACID: IV    TOURNIQUET TIME: 61 min @300 mmHg     ESTIMATED BLOOD LOSS: 100 cc     SPECIMENS: None    IMPLANTS:   /Brand: [employer] triathlon  Tibial component size: 5 pressfit tritanium baseplate   Femoral component size: 4 pressfit cruciate retaining   Tibial polyethylene insert: 9 mm cruciate stabilizing   Patellar component: 32 mm asymmetric tritanium  Cement: None    DRAINS: None    LOCAL INJECTION: 1 cc Toradol 30mg/ml, 4 cc duramorph 2mg/ml, 20 cc 0.5% ropivicaine, 20 cc 0.5% lidocaine with 1:200,000 epinephrine, 15 cc preservative free normal saline     MODIFIER(S): None    COMPLICATIONS: None apparent    INDICATIONS FOR PROCEDURE: The patient has a long history of progressive knee pain, arthritis, and degeneration resulting in deformity in the left knee from predominantly medial wear and bone loss. Non-operative treatment and conservative therapeutic measures have been attempted, but have not improved or controlled the symptoms and pain that occurs during normal daily activities. Knee motion has also become limited and is restricting the patient. Total  knee arthroplasty was recommended. The risks, benefits, alternatives, and potential complications of the arthroplasty surgery were discussed with the patient in detail to include but not be limited to infection, bleeding, anesthesia risks, damage to neurovascular structures, osteolysis, aseptic loosening, instability, anterior knee pain, continued pain, iatrogenic fracture, dislocation, need for future surgery including the potential for amputation, blood clots, myocardial infarction, stroke, and death. Specific details of the surgical procedure, hospitalization, recovery, rehabilitation, and long-term precautions were also presented. Pre-operative teaching was provided. Implant/prosthesis selection was outlined, and the many options available were explained; the final choice will be made at the time of the procedure to match the anatomy and condition of the bone, ligaments, tendons, and muscles. The patient completed preoperative medical optimization and risk assessment, joint arthroplasty education, and MRSA decolonization using a universal decolonization protocol. Perioperative blood management and the potential for blood transfusion were discussed with risks and options clearly outlined.     INTRAOPERATIVE FINDINGS: Advanced tricompartmental osteoarthritis with genu varum alignment    PROCEDURE: The patient was identified in the preoperative holding area. The operative site was confirmed and marked. A sequential compression device was placed on the nonoperative leg. The risks, benefits, and alternatives to surgery were again confirmed with the patient and the patient wished to proceed. The patient was brought to the operating room and placed on the operating room table in the supine position. All bony prominences were padded. A huddle was performed with the patient and all vital surgical team members to confirm the correct operative site, procedure, anesthesia type, and operative plan with the patient. After  anesthesia was performed, a tourniquet was applied to the upper thigh of the operative leg. A full knee exam was performed once anesthesia was in full effect. Intravenous antibiotic prophylaxis was given and confirmed with the anesthesia team.     The operative leg was prepped and draped in the usual sterile fashion. A surgical time out was performed immediately preceding the incision with all personnel in the operating room to confirm patient identity, the correct operative site and extremity, correct radiographic studies, availability of appropriate surgical equipment and agreement on the planned procedure. The operative knee was elevated and exsanguinated using an esmarch and the tourniquet was inflated. The knee was exposed using a limited anterior-midline skin incision. Dissection was carried down through skin and subcutaneous tissue to the extensor mechanism with a scalpel. A medial parapatellar arthrotomy was made to enter the knee space sharply. A large amount of normal appearing joint fluid was encountered and suctioned. The synovium was thickened, hypertrophic, and inflamed. A partial synovectomy was performed for exposure, and the medial and lateral gutters were cleared of scar and synovial reflections. The superficial medial collateral ligament was carefully elevated off osteophytes which were then removed with a rongeur and osteotome. Degenerative meniscal remnants were excised medially and laterally. The patellar synovial reflections were released and the patella exposed to reveal complete wear through the articular surface. The trochlea demonstrated similar severe wear. The patella was then subluxed laterally. The knee was then flexed up to 90 degrees.     Assessment of the knee joint revealed severe end-stage articular damage with no remaining knee weight bearing cartilage. The medial compartment was severely eburnated with bone loss on the medial tibia and medial femoral condyle, resulting in the  varus deformity. Osteophytes were removed from the notch. The ACL was resected. Osteophytes were then further debrided from the femur and the tibia.     Attention was then turned to the femur. The medullary cavity of the femur was entered anterior-medial to the intra-condylar notch above the PCL with a 5/6th inch step drill. The femoral canal was over-reamed, irrigated, and suctioned to prevent fat embolization. An intramedullary alignment system was then placed, fixed to the femur with pins, and the distal femoral osteotomy was made in 5 degrees of valgus with an 8 mm resection. Attention was then turned to the tibia. Retractors were placed medially and laterally to protect the collateral ligaments and a Chester retractor was placed around the PCL in the intra-condylar notch. The tibia was then subluxed anteriorly for wide exposure. An extramedullary alignment system was then placed and the proximal tibial osteotomy was made measuring 1-2 mm off the most affected side and 9-10 mm off the unaffected side with approximately 3 degrees posterior slope. The guide was also confirmed to be perpendicular to the tibial axis prior to the osteotomy. A sizing guide was then used to select the tibial component size. The knee was then brought to extension and the appropriate sized spacer block was placed. This brought the knee to full extension with excellent medial and lateral balance.     The flexion gap was then inspected and measured both visually and with a tensioner device to assess the medial and lateral flexion balance. A femur posterior reference guide was placed in 6 degrees of external rotation in accordance with the soft tissue balance. This resulted in symmetric flexion and extension gaps and was verified against the epicondylar axis and Mu's line. The femur was sized using a posterior referencing guide and, using the previously determined degrees of rotation, drill holes were made for the cutting block. The  4 in 1 cutting block was impacted into place and secured. Cuts were completed using a saw with the collaterals protected by Z-retractors. Care was taken to preserve the PCL. A lamina  was placed with the knee in 90 degrees of flexion and a large curved osteotome, rongeur, and curettes were utilized to clear posterior osteophytes, loose bodies and meniscal remnants.     A femoral trial implant was placed; excellent fit was confirmed. The medial-lateral and anterior-posterior dimensions were checked; anatomic fit and coverage were achieved.The proximal tibia baseplate trial was placed with its mid-point at the junction of medial one-third and lateral two-thirds of the tibial tubercle and pinned to this fixed position. A trial reduction was then performed. Trial reduction demonstrated the knee achieved full extension with excellent stability and range of motion, and no tendency toward instability with varus-valgus stress at full extension, mid-flexion, or 90 degrees of flexion. The PCL was also found to be appropriately tensioned with normal posterior tibial excursion.     Next, attention was turned to the patella. It was measured and the posterior 9-10 mm was resected leaving a healthy remnant with greater than 11mm thickness. The patella was sized with the asymmetric guide, and drill holes were made. A trial button was placed and tracking of the patella and the entire knee trial was tested. The patella tracking was excellent throughout range of motion with no instability. Punches and drills were then placed through the trials to accommodate the final implants. All trials were removed.     The wound was copiously lavaged with a pulse irrigation/suction system. The posterior recess of the knee and areas of known bleeding were treated with the electrocautery to reduce post-operative bleeding. A pain cocktail was injected into the yuniel-articular tissues. The cut bone surfaces were then irrigated again, suctioned,  and dried. The final implants were impacted into place, tibia followed by tibial polyethylene followed by femur followed by patella. The tourniquet was released and no excessive bleeding was encountered. Synovial bleeding was further treated with the electrocautery until adequate hemostasis was obtained.     The wound was again irrigated with dilute betadine solution followed by saline. The extensor mechanism and capsule was then anatomically closed with interrupted #1 Vicryl suture and a running #2 Stratafix stitch. Knee stability and range of motion with the capsule closed was excellent, and range of motion was 0 to 135 degrees without excessive stress on the repair. Instrument and sponge count was completed and confirmed correct. Deep and superficial subcutaneous tissue was closed with interrupted 2-0 Vicryl suture. A running 3-0 Monocryl subcuticular stitch was used to re-approximate the skin edges followed by skin glue adhesive to seal the wound. A silver impregnated dressing was then placed, followed by a sequential compression device to the operative limb. The patient was sufficiently recovered from anesthesia, transferred to a hospital bed and taken to the PACU in stable condition.     One gram (1000 mg) of intravenous tranexamic acid was administered prior to incision. A second one gram (1000 mg) intravenous dose was given prior to wound closure.    No apparent complications occurred during the procedure. Instrument, sponge and needle counts were correct x 2.     The patient underwent risk stratification preoperatively and aspirin was chosen for DVT prophylaxis. Delay in starting chemical prophylaxis for 23 hours from surgical incision was over concerns for hematoma formation and wound related issues.     POST OPERATIVE PLAN:   Weight bearing as tolerated with knee range of motion as tolerated   Pain control with PO/IV meds   Adductor canal catheter placement by Anesthesia Pain Management Team in PACU.   23  hours perioperative antibiotic prophylaxis   PT/OT for mobilization and medical equipment needs   Keep silver dressing in place for 7 days post op. Change dressing only if saturated.   SCDs to bilateral lower extremities   Social work for discharge planning needs   Follow up in 3 weeks for post operative wound check with XR AP and lateral of operative knee.

## 2023-05-24 NOTE — ANESTHESIA PREPROCEDURE EVALUATION
Anesthesia Evaluation     Patient summary reviewed and Nursing notes reviewed   history of anesthetic complications: PONV  NPO Solid Status: > 8 hours  NPO Liquid Status: > 8 hours           Airway   Mallampati: II  TM distance: >3 FB  Neck ROM: full  No difficulty expected  Dental      Pulmonary - negative pulmonary ROS and normal exam   Cardiovascular - normal exam    (+) hyperlipidemia,       Neuro/Psych- negative ROS  GI/Hepatic/Renal/Endo    (+) morbid obesity, GERD,      Musculoskeletal     Abdominal    Substance History      OB/GYN          Other   arthritis,                      Anesthesia Plan    ASA 3     spinal     (Post op block)  intravenous induction     Anesthetic plan, risks, benefits, and alternatives have been provided, discussed and informed consent has been obtained with: patient.    Plan discussed with CRNA.        CODE STATUS:

## 2023-05-24 NOTE — BRIEF OP NOTE
TOTAL KNEE ARTHROPLASTY  Progress Note    Stephani Tovar  5/24/2023    Pre-op Diagnosis:   Primary osteoarthritis of left knee [M17.12]       Post-Op Diagnosis Codes:     * Primary osteoarthritis of left knee [M17.12]    Procedure/CPT® Codes:  IN ARTHRP KNE CONDYLE&PLATU MEDIAL&LAT COMPARTMENTS [86322]      Procedure(s):  TOTAL KNEE ARTHROPLASTY - LEFT    Surgical Approach: Knee Medial Parapatellar            Surgeon(s):  Jonas Lipscomb MD    Anesthesia: Spinal    Staff:   Circulator: Jalen Akins RN; Eitan Narvaze RN; Magy Diaz RN  Scrub Person: Kelly Graham RN  Vendor Representative: Jacob Bautista  Nursing Assistant: Luis Arnett PCT  Assistant: Dmitriy Lopez PA-C  Assistant: Dmitriy Lopez PA-C      Estimated Blood Loss: 100ml    Urine Voided: * No values recorded between 5/24/2023  9:31 AM and 5/24/2023 11:29 AM *    Specimens:                None          Drains: * No LDAs found *    Findings: Advanced tricompartmental osteoarthritis with genu varum alignment    Complications: None apparent    Assistant: Dmitriy Lopez PA-C  was responsible for performing the following activities: Retraction, Suction, Irrigation, Suturing, Closing and Placing Dressing and their skilled assistance was necessary for the success of this case.    Jonsa Lipscomb MD     Date: 5/24/2023  Time: 11:29 EDT

## 2023-05-24 NOTE — H&P
Patient Name: Stephani Tovar  MRN: [id number]  : 1955  DOS: 2023    Attending: Jonas Lipscomb MD    Primary Care Provider: Elen Tovar MD      Chief complaint: Left knee pain    Subjective   Patient is a pleasant 67 y.o. female presented for scheduled surgery by Dr. Lipscomb.  She underwent left total knee arthroplasty under spinal anesthesia.  She tolerated surgery well and was admitted for further medical management.  She had a knee injury about 8 years ago and has had worsening pain since then.  She has had previous meniscus repair.  Conservative treatments failed to provide lasting benefits.    When seen postop she is doing well.  Her pain is well controlled.  She denies nausea, shortness of breath or chest pain.  No history of DVT or PE.    Allergies:  Allergies   Allergen Reactions   • Statins Myalgia     Other reaction(s): Myalgias (Muscle Pain)         Meds:  Medications Prior to Admission   Medication Sig Dispense Refill Last Dose   • Ascorbic Acid (VITAMIN C PO) 1000mg once daily   Past Week   • Coenzyme Q10 400 MG capsule Take 1 capsule by mouth Every Night. 90 capsule 3 Past Week   • CREAM BASE EX Apply  topically to the appropriate area as directed. Apply topically Testosterone 0.3mg/progesterone 25mg/0.5ml TD daily in syringes OK TO FILL 4 MONTH SUPPLY .   2023   • estradiol (MINIVELLE, VIVELLE-DOT) 0.05 MG/24HR patch 1 patch 2 (Two) Times a Week.   2023   • loratadine (CLARITIN) 10 MG tablet Take 1 tablet by mouth Daily.   Past Week   • metFORMIN (GLUCOPHAGE) 500 MG tablet Take 2 tablets by mouth Daily With Dinner. 2 tablets every evening   Past Month   • NON FORMULARY Indoplex w/DIM  1 capsule daily   Past Week   • NON FORMULARY Reservatrol  Once daily   Past Week   • Nutritional Supplements (DHEA PO) Pt takes 5mg daily   Past Week   • rosuvastatin (CRESTOR) 5 MG tablet Take 1 tablet by mouth 3 (Three) Times a Week.   2023   • Semaglutide,0.25 or  0.5MG/DOS, (Ozempic, 0.25 or 0.5 MG/DOSE,) 2 MG/1.5ML solution pen-injector Inject 0.5 mg under the skin into the appropriate area as directed 1 (One) Time Per Week. 1.5 mL 5 Past Week   • fluocinonide (LIDEX) 0.05 % external solution Apply  topically to the appropriate area as directed 2 (Two) Times a Day. 60 mL 5 More than a month   • omeprazole (priLOSEC) 40 MG capsule Take 1 capsule by mouth Daily. 90 capsule 1 More than a month   • Triamcinolone Acetonide (NASACORT ALLERGY 24HR NA) Daily As Needed. 2 sprays in each nostril per day   More than a month   • valACYclovir (VALTREX) 500 MG tablet Take 1 tablet by mouth Daily. Takes PRN   More than a month         History:   Past Medical History:   Diagnosis Date   • Arthritis    • Arthritis of neck 2009   • Breast injury 03/2017    RT BREAST HIT S/P FALL   • Cervical disc disorder 2009    Cervical   • Frozen shoulder 2009    Right shoulder   • GERD (gastroesophageal reflux disease)    • Heartburn    • Hip arthrosis 2023    Right hip   • Hyperlipidemia    • Knee swelling 2022    Left knee   • PONV (postoperative nausea and vomiting)     zofran and phen help pt   • Psoriasis     scalp   • Rotator cuff syndrome 2009    Right shoulder   • Scoliosis 8 years ago   • Tear of meniscus of knee 2013    LEFT   • Wears glasses     readers     Past Surgical History:   Procedure Laterality Date   • AUGMENTATION MAMMAPLASTY Bilateral 01/2017    EXPLANTED & REPLACED 04/20/2022   • BREAST EXCISIONAL BIOPSY Bilateral 1980   • CHOLECYSTECTOMY      with hysterectomy   • COLONOSCOPY     • FOOT SURGERY  2013    Bilateral bunionectomy . 3rd toe right foot tendon release   • HYSTERECTOMY  2004    total   • KNEE SURGERY Left 2010    Torn meniscus Left   • OOPHORECTOMY Bilateral 2004   • REDUCTION MAMMAPLASTY Bilateral 2013    WITH MASTOPEXY   • SHOULDER SURGERY Right 2009    Torn rotator cuff repair right   • TONSILLECTOMY     • WISDOM TOOTH EXTRACTION       Family History   Problem Relation  "Age of Onset   • Arthritis Mother    • Hyperlipidemia Mother    • Osteoporosis Mother    • Hyperlipidemia Father    • Diabetes Father    • Brain cancer Father    • Hyperlipidemia Sister    • Hyperlipidemia Sister    • Hyperlipidemia Brother    • Coronary artery disease Brother    • Liver disease Brother    • Rheumatologic disease Maternal Aunt    • Ovarian cancer Neg Hx    • Breast cancer Neg Hx      Social History     Tobacco Use   • Smoking status: Never   • Smokeless tobacco: Never   Vaping Use   • Vaping Use: Never used   Substance Use Topics   • Alcohol use: Yes     Alcohol/week: 2.0 standard drinks     Types: 2 Glasses of wine per week     Comment: 2-3 drinks per week   • Drug use: No   She is  with no children.  She is a business owner.    Review of Systems  Pertinent items are noted in HPI, all other systems reviewed and negative    Vital Signs  /83   Pulse 90   Temp 97.2 °F (36.2 °C) (Temporal)   Resp 16   Ht 165.1 cm (65\")   Wt 85 kg (187 lb 6.3 oz)   SpO2 96%   BMI 31.18 kg/m²     Physical Exam:    General Appearance:    Alert, cooperative, in no acute distress   Head:    Normocephalic, without obvious abnormality, atraumatic   Eyes:            Lids and lashes normal, conjunctivae and sclerae normal, no   icterus, no pallor, corneas clear,    Ears:    Ears appear intact with no abnormalities noted   Throat:   No oral lesions, no thrush, oral mucosa moist   Neck:   No adenopathy, supple, trachea midline, no thyromegaly    Lungs:     Clear to auscultation,respirations regular, even and unlabored    Heart:    Regular rhythm and normal rate, normal S1 and S2, no murmur, no gallop   Abdomen:     Normal bowel sounds, no masses, no organomegaly, soft non-tender, non-distended, no guarding, no rebound  tenderness   Genitalia:    Deferred   Extremities:  Left knee Ace wrap CDI.  Nerve block present.   Pulses:   Pulses palpable and equal bilaterally   Skin:   No bleeding, bruising or rash "   Neurologic:   Cranial nerves 2 - 12 grossly intact. Flexion and dorsiflexion intact bilateral feet.        I reviewed the patient's new clinical results.     Lab Results   Component Value Date    HGBA1C 5.10 05/15/2023      Latest Reference Range & Units 05/15/23 10:56   Glucose 65 - 99 mg/dL 84   Sodium 136 - 145 mmol/L 140   Potassium 3.5 - 5.2 mmol/L 4.6   CO2 22.0 - 29.0 mmol/L 25.0   Chloride 98 - 107 mmol/L 104   Anion Gap 5.0 - 15.0 mmol/L 11.0   Creatinine 0.57 - 1.00 mg/dL 0.78   BUN 8 - 23 mg/dL 22   BUN/Creatinine Ratio 7.0 - 25.0  28.2 (H)   Calcium 8.6 - 10.5 mg/dL 9.9   eGFR >60.0 mL/min/1.73 84.4   Alkaline Phosphatase 39 - 117 U/L 59   Total Protein 6.0 - 8.5 g/dL 6.7   ALT (SGPT) 1 - 33 U/L 23   AST (SGOT) 1 - 32 U/L 23   Total Bilirubin 0.0 - 1.2 mg/dL 0.6   Albumin 3.5 - 5.2 g/dL 4.8   Globulin gm/dL 1.9   A/G Ratio g/dL 2.5   Hemoglobin A1C 4.80 - 5.60 % 5.10   Protime 12.2 - 14.5 Seconds 12.8   INR 0.89 - 1.12  0.96   PTT 22.0 - 39.0 seconds 28.4   WBC 3.40 - 10.80 10*3/mm3 6.20   RBC 3.77 - 5.28 10*6/mm3 5.34 (H)   Hemoglobin 12.0 - 15.9 g/dL 15.1   Hematocrit 34.0 - 46.6 % 48.2 (H)   RDW 12.3 - 15.4 % 12.0 (L)   MCV 79.0 - 97.0 fL 90.3   MCH 26.6 - 33.0 pg 28.3   MCHC 31.5 - 35.7 g/dL 31.3 (L)   MPV 6.0 - 12.0 fL 9.1   Platelets 140 - 450 10*3/mm3 252   (H): Data is abnormally high  (L): Data is abnormally low    Assessment and Plan:     Status post total left knee replacement    Primary osteoarthritis of left knee    Mixed hyperlipidemia      Plan  1. PT/OT- WBAT LLE  2. Pain control-prns, AC nerve   3. IS-encourage  4. DVT proph- Mechs/ASA  5. Bowel regimen  6. Resume home medications as appropriate  7. Monitor post-op labs  8. DC planning for home    Hyperlipidemia  -Continue home statin      Clemencia Domínguez, RODO  05/24/23  16:03 EDT

## 2023-05-24 NOTE — PLAN OF CARE
Goal Outcome Evaluation:  Plan of Care Reviewed With: patient        Progress: no change  Outcome Evaluation: PT eval complete. Pt amb in halls with FWW and CGAx2. No knee buckling or LOB noted. Activity limited by fatigue. HEP and precautions reviewed with pt. Recommend d/c home with assist and OPPT when medically appropriate. Pt cleared to d/c today from PT standpoint.

## 2023-05-24 NOTE — THERAPY EVALUATION
Patient Name: Stephani Tovar  : 1955    MRN: [id number]                              Today's Date: 2023       Admit Date: 2023    Visit Dx:     ICD-10-CM ICD-9-CM   1. S/P total knee arthroplasty, left  Z96.652 V43.65   2. Primary osteoarthritis of left knee  M17.12 715.16     Patient Active Problem List   Diagnosis   • Mixed hyperlipidemia   • Statin intolerance   • Chronic pain of left knee   • Class 1 obesity due to excess calories with serious comorbidity and body mass index (BMI) of 32.0 to 32.9 in adult   • Psoriasis of scalp   • Family history of fatty liver   • Perennial allergic rhinitis with seasonal variation   • Herpes labialis without complication   • Gastroesophageal reflux disease without esophagitis   • Epistaxis, recurrent   • Urinary frequency   • Dry skin   • Metabolic syndrome   • Primary osteoarthritis of left knee   • Status post total left knee replacement     Past Medical History:   Diagnosis Date   • Arthritis    • Arthritis of neck    • Breast injury 2017    RT BREAST HIT S/P FALL   • Cervical disc disorder     Cervical   • Frozen shoulder     Right shoulder   • GERD (gastroesophageal reflux disease)    • Heartburn    • Hip arthrosis     Right hip   • Hyperlipidemia    • Knee swelling     Left knee   • PONV (postoperative nausea and vomiting)     zofran and phen help pt   • Psoriasis     scalp   • Rotator cuff syndrome     Right shoulder   • Scoliosis 8 years ago   • Tear of meniscus of knee     LEFT   • Wears glasses     readers     Past Surgical History:   Procedure Laterality Date   • AUGMENTATION MAMMAPLASTY Bilateral 2017    EXPLANTED & REPLACED 2022   • BREAST EXCISIONAL BIOPSY Bilateral    • CHOLECYSTECTOMY      with hysterectomy   • COLONOSCOPY     • FOOT SURGERY      Bilateral bunionectomy . 3rd toe right foot tendon release   • HYSTERECTOMY  2004    total   • KNEE SURGERY Left     Torn meniscus Left    • OOPHORECTOMY Bilateral 2004   • REDUCTION MAMMAPLASTY Bilateral 2013    WITH MASTOPEXY   • SHOULDER SURGERY Right 2009    Torn rotator cuff repair right   • TONSILLECTOMY     • WISDOM TOOTH EXTRACTION        General Information     Row Name 05/24/23 1544          Physical Therapy Time and Intention    Document Type evaluation  -     Mode of Treatment physical therapy  -     Row Name 05/24/23 1544          General Information    Patient Profile Reviewed yes  -     Prior Level of Function min assist:;all household mobility;community mobility;gait;transfer;bed mobility;ADL's  -     Existing Precautions/Restrictions fall;other (see comments)  adductor canal nerve cath  -     Barriers to Rehab none identified  -     Row Name 05/24/23 1544          Living Environment    People in Home spouse  -     Row Name 05/24/23 1544          Home Main Entrance    Number of Stairs, Main Entrance none  -     Row Name 05/24/23 1544          Stairs Within Home, Primary    Number of Stairs, Within Home, Primary none  -[facility] Name 05/24/23 1544          Cognition    Orientation Status (Cognition) oriented x 3  -     Row Name 05/24/23 1544          Safety Issues, Functional Mobility    Safety Issues Affecting Function (Mobility) insight into deficits/self-awareness;awareness of need for assistance;safety precaution awareness  -     Impairments Affecting Function (Mobility) balance;endurance/activity tolerance;pain;strength;range of motion (ROM);sensation/sensory awareness;motor control  -           User Key  (r) = Recorded By, (t) = Taken By, (c) = Cosigned By    Initials Name Provider Type     Margarita Martinez PT Physical Therapist               Mobility     Row Name 05/24/23 1736          Bed Mobility    Bed Mobility supine-sit;sit-supine  -     Supine-Sit [place] (Bed Mobility) standby assist  -     Sit-Supine [place] (Bed Mobility) standby assist  -     Comment, (Bed Mobility) VC for  sequencing  -     Row Name 05/24/23 1731          Transfers    Comment, (Transfers) VC for hand placement  -     Row Name 05/24/23 1731          Sit-Stand Transfer    Sit-Stand [place] (Transfers) contact guard;2 person assist  -     Assistive Device (Sit-Stand Transfers) walker, front-wheeled  -HP     Row Name 05/24/23 1731          Gait/Stairs (Locomotion)    [place] Level (Gait) contact guard;2 person assist  -     Assistive Device (Gait) walker, front-wheeled  -HP     Ambulated day of surgery or within 4 hours of PACU discharge yes  -     Distance in Feet (Gait) 330  -     Deviations/Abnormal Patterns (Gait) bilateral deviations;base of support, wide;weight shifting decreased;stride length decreased;gait speed decreased  -     Bilateral Gait Deviations forward flexed posture;heel strike decreased  -     Comment, (Gait/Stairs) Pt amb in halls with FWW and CGAx2. Pt demonstrated step-through gait pattern with wide IZABELLA, decreased weight acceptance on LLE, forward flexed posture, slow gait pattern, and decreased stride length. VC for upright posture and increased heel strike with good improvement within session. No knee buckling or LOB noted. Activity limited by fatigue.  -     Row Name 05/24/23 1731          Mobility    Extremity Weight-bearing Status left lower extremity  -     Left Lower Extremity (Weight-bearing Status) weight-bearing as tolerated (WBAT)  -           User Key  (r) = Recorded By, (t) = Taken By, (c) = Cosigned By    Initials Name Provider Type     Margarita Martinez PT Physical Therapist               Obj/Interventions     Row Name 05/24/23 1734          Range of Motion Comprehensive    General Range of Motion lower extremity range of motion deficits identified  -     Comment, General Range of Motion L knee ROM 10-70  -     Row Name 05/24/23 1734          Strength Comprehensive (MMT)    General Manual Muscle Testing (MMT) Assessment lower extremity strength  deficits identified  -     Comment, General Manual Muscle Testing (MMT) Assessment Pt able to perform B active ankle DF and SLR  -     Row Name 05/24/23 1734          Motor Skills    Therapeutic Exercise knee;ankle  -[facility] Name 05/24/23 1734          Knee (Therapeutic Exercise)    Knee (Therapeutic Exercise) strengthening exercise;isometric exercises  -     Knee Isometrics (Therapeutic Exercise) left;quad sets;10 repetitions  -     Knee Strengthening (Therapeutic Exercise) left;SLR (straight leg raise);LAQ (long arc quad);heel slides;SAQ (short arc quad);5 repetitions  -[facility] Name 05/24/23 1734          Ankle (Therapeutic Exercise)    Ankle (Therapeutic Exercise) AROM (active range of motion)  -     Ankle AROM (Therapeutic Exercise) bilateral;dorsiflexion;plantarflexion;10 repetitions  -[facility] Name 05/24/23 1734          Balance    Balance Assessment sitting static balance;sitting dynamic balance;sit to stand dynamic balance;standing static balance;standing dynamic balance  -     Static Sitting Balance standby assist  -     Dynamic Sitting Balance standby assist  -     Position, Sitting Balance sitting edge of bed  -     Static Standing Balance contact guard  -     Dynamic Standing Balance contact guard  -     Position/Device Used, Standing Balance supported;walker, rolling  -     Balance Interventions sitting;standing;sit to stand;occupation based/functional task  -[facility] Name 05/24/23 1734          Sensory Assessment (Somatosensory)    Sensory Assessment (Somatosensory) LE sensation intact  -           User Key  (r) = Recorded By, (t) = Taken By, (c) = Cosigned By    Initials Name Provider Type     Margarita Martinez, JEAN CARLOS Physical Therapist               Goals/Plan     [facility] Name 05/24/23 1739          Bed Mobility Goal 1 (PT)    Activity/Assistive Device (Bed Mobility Goal 1, PT) sit to supine/supine to sit  -     [place] Level/Cues Needed (Bed Mobility Goal 1, PT)  modified independence  -HP     Time Frame (Bed Mobility Goal 1, PT) long term goal (LTG);3 days  -HP     Progress/Outcomes (Bed Mobility Goal 1, PT) goal ongoing  -     Row Name 05/24/23 1736          Transfer Goal 1 (PT)    Activity/Assistive Device (Transfer Goal 1, PT) sit-to-stand/stand-to-sit  -HP     [place] Level/Cues Needed (Transfer Goal 1, PT) modified independence  -HP     Time Frame (Transfer Goal 1, PT) long term goal (LTG);3 days  -HP     Progress/Outcome (Transfer Goal 1, PT) goal ongoing  -     Row Name 05/24/23 1736          Gait Training Goal 1 (PT)    Activity/Assistive Device (Gait Training Goal 1, PT) gait (walking locomotion)  -HP     [place] Level (Gait Training Goal 1, PT) modified independence  -HP     Distance (Gait Training Goal 1, PT) 500  -HP     Time Frame (Gait Training Goal 1, PT) long term goal (LTG);3 days  -HP     Progress/Outcome (Gait Training Goal 1, PT) goal ongoing  -     Row Name 05/24/23 1736          ROM Goal 1 (PT)    ROM Goal 1 (PT) L knee ROM 0-90  -HP     Time Frame (ROM Goal 1, PT) long-term goal (LTG);3 days  -HP     Progress/Outcome (ROM Goal 1, PT) goal ongoing  -     Row Name 05/24/23 1736          Therapy Assessment/Plan (PT)    Planned Therapy Interventions (PT) balance training;bed mobility training;gait training;home exercise program;patient/family education;transfer training;stretching;strengthening;stair training;ROM (range of motion)  -           User Key  (r) = Recorded By, (t) = Taken By, (c) = Cosigned By    Initials Name Provider Type    HP Margarita Martinez, PT Physical Therapist               Clinical Impression     Row Name 05/24/23 1733          Pain    Pretreatment Pain Rating 0/10 - no pain  -HP     Posttreatment Pain Rating 0/10 - no pain  -HP     Row Name 05/24/23 1735          Plan of Care Review    Plan of Care Reviewed With patient  -HP     Progress no change  -HP     Outcome Evaluation PT eval complete. Pt amb in halls with  FWW and CGAx2. No knee buckling or LOB noted. Activity limited by fatigue. HEP and precautions reviewed with pt. Recommend d/c home with assist and OPPT when medically appropriate. Pt cleared to d/c today from PT standpoint.  -     Row Name 05/24/23 1735          Therapy Assessment/Plan (PT)    Rehab Potential (PT) good, to achieve stated therapy goals  -     Criteria for Skilled Interventions Met (PT) yes;meets criteria;skilled treatment is necessary  -HP     Therapy Frequency (PT) 2 times/day  -     Row Name 05/24/23 1735          Vital Signs    Pre Systolic BP Rehab --  VSS  -HP     Pre Patient Position Supine  -HP     Intra Patient Position Standing  -HP     Post Patient Position Sitting  -HP     Row Name 05/24/23 1735          Positioning and Restraints    Pre-Treatment Position in bed  -HP     Post Treatment Position chair  -HP     In Chair notified nsg;reclined;sitting;call light within reach;encouraged to call for assist;exit alarm on;with family/caregiver;legs elevated;compression device  -           User Key  (r) = Recorded By, (t) = Taken By, (c) = Cosigned By    Initials Name Provider Type     Margairta Martinez, PT Physical Therapist               Outcome Measures     Row Name 05/24/23 1736 05/24/23 1541       How much help from another person do you currently need...    Turning from your back to your side while in flat bed without using bedrails? 4  -HP 3  -LB    Moving from lying on back to sitting on the side of a flat bed without bedrails? 4  -HP 3  -LB    Moving to and from a bed to a chair (including a wheelchair)? 3  -HP 3  -LB    Standing up from a chair using your arms (e.g., wheelchair, bedside chair)? 3  -HP 3  -LB    Climbing 3-5 steps with a railing? 3  -HP 3  -LB    To walk in hospital room? 3  -HP 3  -LB    AM-PAC 6 Clicks Score (PT) 20  -HP 18  -LB    Highest level of mobility 6 --> Walked 10 steps or more  -HP 6 --> Walked 10 steps or more  -LB    Row Name 05/24/23 1733           PADD    Diagnosis 1  -     Gender 1  -     Age Group 4  -     Gait Distance 1  -     Assist Level 1  -     Home Support 3  -     PADD Score 9  -     Patient Preference home with outpatient rehab  -     Prediction by PADD Score directly home (with home health or out-patient rehab)  -     Row Name 05/24/23 1736          Functional Assessment    Outcome Measure Options AM-PAC 6 Clicks Basic Mobility (PT);PADD  -HP           User Key  (r) = Recorded By, (t) = Taken By, (c) = Cosigned By    Initials Name Provider Type    Lauren Grace, RN Registered Nurse     Margarita Martinez, PT Physical Therapist                             Physical Therapy Education     Title: PT OT SLP Therapies (Done)     Topic: Physical Therapy (Done)     Point: Mobility training (Done)     Learning Progress Summary           Patient Acceptance, E,D, VU,DU by  at 5/24/2023 1737                   Point: Home exercise program (Done)     Learning Progress Summary           Patient Acceptance, E,D, VU,DU by  at 5/24/2023 1737                   Point: Body mechanics (Done)     Learning Progress Summary           Patient Acceptance, E,D, VU,DU by  at 5/24/2023 1737                   Point: Precautions (Done)     Learning Progress Summary           Patient Acceptance, E,D, VU,DU by  at 5/24/2023 1737                               User Key     Initials Effective Dates Name Provider Type Discipline     06/01/21 -  Margarita Martinez, JEAN CARLOS Physical Therapist PT              PT Recommendation and Plan  Planned Therapy Interventions (PT): balance training, bed mobility training, gait training, home exercise program, patient/family education, transfer training, stretching, strengthening, stair training, ROM (range of motion)  Plan of Care Reviewed With: patient  Progress: no change  Outcome Evaluation: PT eval complete. Pt amb in halls with FWW and CGAx2. No knee buckling or LOB noted. Activity limited by fatigue. HEP and precautions  reviewed with pt. Recommend d/c home with assist and OPPT when medically appropriate. Pt cleared to d/c today from PT standpoint.     Time Calculation:    PT Charges     Row Name 05/24/23 1500             Time Calculation    Start Time 1500  -HP      PT Received On 05/24/23  -HP      PT Goal Re-Cert Due Date 06/03/23  -HP         Timed Charges    37753 - PT Therapeutic Exercise Minutes 5  -HP      99143 - Gait Training Minutes  5  -HP         Untimed Charges    PT Eval/Re-eval Minutes 50  -HP         Total Minutes    Timed Charges Total Minutes 10  -HP      Untimed Charges Total Minutes 50  -HP       Total Minutes 60  -HP            User Key  (r) = Recorded By, (t) = Taken By, (c) = Cosigned By    Initials Name Provider Type     Margarita Martinez, JEAN CARLOS Physical Therapist              Therapy Charges for Today     Code Description Service Date Service Provider Modifiers Qty    36621351500 HC PT THER PROC EA 15 MIN 5/24/2023 Margarita Martinez, PT GP 1    31234163591 HC PT EVAL LOW COMPLEXITY 4 5/24/2023 Margarita Martinez, PT GP 1    09065176142 HC PT THER SUPP EA 15 MIN 5/24/2023 Magrarita Martinez, PT GP 3          PT G-Codes  Outcome Measure Options: AM-PAC 6 Clicks Basic Mobility (PT), PADD  AM-PAC 6 Clicks Score (PT): 20  PT Discharge Summary  Anticipated Discharge Disposition (PT): home with assist, home with outpatient therapy services    Margarita Martinez PT  5/24/2023

## 2023-05-24 NOTE — ANESTHESIA PROCEDURE NOTES
LEFT Adductor canal cath      Patient reassessed immediately prior to procedure    Reason for block: at surgeon's request and post-op pain management  Performed by  CRNA/CAA: Rishabh Rose, HAFSA  Assisted by: Radha Jefferson RN  Preanesthetic Checklist  Completed: patient identified, IV checked, site marked, risks and benefits discussed, surgical consent, monitors and equipment checked, pre-op evaluation and timeout performed  Prep:  Pt Position: supine  Sterile barriers:cap, gloves, mask, sterile barriers and washed/disinfected hands  Prep: ChloraPrep  Patient monitoring: blood pressure monitoring, continuous pulse oximetry and EKG  Procedure    Sedation: no  Performed under: spinal  Guidance:ultrasound guided    ULTRASOUND INTERPRETATION.  Using ultrasound guidance a 20 G gauge needle was placed in close proximity to the nerve, at which point, under ultrasound guidance anesthetic was injected in the area of the nerve and spread of the anesthesia was seen on ultrasound in close proximity thereto.  There were no abnormalities seen on ultrasound; a digital image was taken; and the patient tolerated the procedure with no complications. Images:still images obtained, printed/placed on chart    Laterality:left  Block Type:adductor canal block  Injection Technique:catheter  Needle Type:Tuohy and echogenic  Needle Gauge:18 G  Resistance on Injection: none  Catheter Size:20 G (20g)  Cath Depth at skin: 10 cm    Medications Used: bupivacaine PF (MARCAINE) 0.25 % injection - Injection   20 mL - 5/24/2023 11:47:00 AM      Post Assessment  Injection Assessment: negative aspiration for heme, incremental injection and no paresthesia on injection  Patient Tolerance:comfortable throughout block  Complications:no  Additional Notes  CATHETER   A high-frequency linear transducer, with sterile cover, was placed on the anterior mid-thigh (between the anterior superior iliac spine and patella). The transducer was then moved medially  "to identify the Sartorius muscle (Girish), Vastus Medialis muscle (VMM), Superficial Femoral Artery (SFA) and Vein. The transducer was then moved cephalad or caudad to position the SFA in the middle of the Girish. The insertion site was prepped and draped in sterile fashion. Skin and cutaneous tissue was infiltrated with 2-5 ml of 1% Lidocaine. Using ultrasound-guidance, an 18-gauge Groovesharkiplex Ultra 360 Touhy needle was advanced in plane from lateral to medial. Preservative-free normal saline was utilized for hydro-dissection of tissue, advancement of Touhy, and to confirm needle placement below the fascial plane of the Girish where the Nerve to the VMM is located. Local anesthetic (LA) 5 ml deposited here. The Touhy needle continues its path lateral to the SFA at the level of the Saphenous Nerve. The remainder of the LA was deposited at the 10-11 o'clock position of the SFA. This injection created a space between the Girish and the SFA. Aspiration every 5 ml to prevent intravascular injection. Injection was completed with negative aspiration of blood and negative intravascular injection. Injection pressures were normal with minimal resistance. A 20-gauge Groovesharkiplex Echo catheter was placed through the needle and advance out the tip of the Touhy 3-5 cm anterior to the SFA. The Touhy needle was then removed, and final catheter position verified at the 12 o'clock position to the SFA. The catheter was secured in the usual fashion with skin glue, benzoin, steri-strips, CHG tegaderm and label noting \"Nerve Block Catheter\". Jerk tape applied at yellow connector and catheter connection.           "

## 2023-05-24 NOTE — H&P
Pre-Op H&P  Stephani Tovar  [id number]  1955      Chief complaint: Left Knee Pain      Subjective:  Patient is a 67 y.o.female presents for scheduled surgery by Dr. Lipscomb. She anticipates a TOTAL KNEE ARTHROPLASTY - LEFT - Left today.  The patient stated that approximately 8 years ago the dog ran into her left leg, and since that time she has had worsening left knee pain.  She denies any other associated symptoms with her present condition.  She states that it is difficult to go down stairs.  She endorsed that the steroid shots helped alleviate her pain for a period of time.  She denies use of assistive devices to help her move around.  She denies any previous falls.      Review of Systems:  Constitutional-- No fever, chills or sweats. No fatigue.  CV-- No chest pain, palpitation or syncope. +HLD.  Resp-- No SOB, cough, hemoptysis  Skin--No rashes or lesions      Allergies:   Allergies   Allergen Reactions   • Statins Myalgia     Other reaction(s): Myalgias (Muscle Pain)           Home Meds:  Medications Prior to Admission   Medication Sig Dispense Refill Last Dose   • Ascorbic Acid (VITAMIN C PO) 1000mg once daily   Past Week   • Coenzyme Q10 400 MG capsule Take 1 capsule by mouth Every Night. 90 capsule 3 Past Week   • CREAM BASE EX Apply  topically to the appropriate area as directed. Apply topically Testosterone 0.3mg/progesterone 25mg/0.5ml TD daily in syringes OK TO FILL 4 MONTH SUPPLY .   5/23/2023   • estradiol (MINIVELLE, VIVELLE-DOT) 0.05 MG/24HR patch 1 patch 2 (Two) Times a Week.   5/24/2023   • loratadine (CLARITIN) 10 MG tablet Take 1 tablet by mouth Daily.   Past Week   • metFORMIN (GLUCOPHAGE) 500 MG tablet Take 2 tablets by mouth Daily With Dinner. 2 tablets every evening   Past Month   • NON FORMULARY Indoplex w/DIM  1 capsule daily   Past Week   • NON FORMULARY Reservatrol  Once daily   Past Week   • Nutritional Supplements (DHEA PO) Pt takes 5mg daily   Past Week   •  rosuvastatin (CRESTOR) 5 MG tablet Take 1 tablet by mouth 3 (Three) Times a Week.   5/23/2023   • Semaglutide,0.25 or 0.5MG/DOS, (Ozempic, 0.25 or 0.5 MG/DOSE,) 2 MG/1.5ML solution pen-injector Inject 0.5 mg under the skin into the appropriate area as directed 1 (One) Time Per Week. 1.5 mL 5 Past Week   • fluocinonide (LIDEX) 0.05 % external solution Apply  topically to the appropriate area as directed 2 (Two) Times a Day. 60 mL 5 More than a month   • omeprazole (priLOSEC) 40 MG capsule Take 1 capsule by mouth Daily. 90 capsule 1 More than a month   • Triamcinolone Acetonide (NASACORT ALLERGY 24HR NA) Daily As Needed. 2 sprays in each nostril per day   More than a month   • valACYclovir (VALTREX) 500 MG tablet Take 1 tablet by mouth Daily. Takes PRN   More than a month         PMH:   Past Medical History:   Diagnosis Date   • Arthritis    • Arthritis of neck 2009   • Breast injury 03/2017    RT BREAST HIT S/P FALL   • Cervical disc disorder 2009    Cervical   • Frozen shoulder 2009    Right shoulder   • GERD (gastroesophageal reflux disease)    • Heartburn    • Hip arthrosis 2023    Right hip   • Hyperlipidemia    • Knee swelling 2022    Left knee   • PONV (postoperative nausea and vomiting)     zofran and phen help pt   • Psoriasis     scalp   • Rotator cuff syndrome 2009    Right shoulder   • Scoliosis 8 years ago   • Tear of meniscus of knee 2013    LEFT   • Wears glasses     readers     PSH:    Past Surgical History:   Procedure Laterality Date   • AUGMENTATION MAMMAPLASTY Bilateral 01/2017    EXPLANTED & REPLACED 04/20/2022   • BREAST EXCISIONAL BIOPSY Bilateral 1980   • CHOLECYSTECTOMY      with hysterectomy   • COLONOSCOPY     • FOOT SURGERY  2013    Bilateral bunionectomy . 3rd toe right foot tendon release   • HYSTERECTOMY  2004    total   • KNEE SURGERY Left 2010    Torn meniscus Left   • OOPHORECTOMY Bilateral 2004   • REDUCTION MAMMAPLASTY Bilateral 2013    WITH MASTOPEXY   • SHOULDER SURGERY Right 2009  "   Torn rotator cuff repair right   • TONSILLECTOMY     • WISDOM TOOTH EXTRACTION         Immunization History:  Influenza: No  Pneumococcal: No  Tetanus: No  Covid : x2    Social History:   Tobacco:   Social History     Tobacco Use   Smoking Status Never   Smokeless Tobacco Never      Alcohol:     Social History     Substance and Sexual Activity   Alcohol Use Yes   • Alcohol/week: 2.0 standard drinks   • Types: 2 Glasses of wine per week    Comment: 2-3 drinks per week         Physical Exam:/84 (BP Location: Right arm, Patient Position: Lying)   Pulse 77   Temp 97.1 °F (36.2 °C) (Temporal)   Resp 16   Ht 165.1 cm (65\")   Wt 85 kg (187 lb 6.3 oz)   SpO2 97%   BMI 31.18 kg/m²       General Appearance:    Alert, cooperative, no distress, appears stated age   Head:    Normocephalic, without obvious abnormality, atraumatic   Lungs:     Clear to auscultation bilaterally, respirations unlabored    Heart:   Regular rate and rhythm, S1 and S2 normal    Abdomen:    Soft without tenderness   Extremities:   Extremities normal, atraumatic, no cyanosis or edema   Skin:   Skin color, texture, turgor normal, no rashes or lesions   Neurologic:   Grossly intact     Results Review:     LABS:  Lab Results   Component Value Date    WBC 6.20 05/15/2023    HGB 15.1 05/15/2023    HCT 48.2 (H) 05/15/2023    MCV 90.3 05/15/2023     05/15/2023    NEUTROABS 3.90 05/15/2023    GLUCOSE 84 05/15/2023    BUN 22 05/15/2023    CREATININE 0.78 05/15/2023    EGFRIFNONA 81 01/02/2020     05/15/2023    K 4.6 05/15/2023     05/15/2023    CO2 25.0 05/15/2023    CALCIUM 9.9 05/15/2023    ALBUMIN 4.8 05/15/2023    AST 23 05/15/2023    ALT 23 05/15/2023    BILITOT 0.6 05/15/2023       RADIOLOGY:  X-Ray on 5/2/23    Indication: Left knee pain, right hip pain     Comparison: No prior xrays are available for comparison    Left knee(s) 4 views: moderate to severe tricompartmental arthritis with genu varum alignment and " bone-on-bone articulation medial compartment, periarticular osteophytes visualized in all compartments     AP pelvis, 2 views right hip: mild joint space narrowing, minimal osteophyte formation    I reviewed the patient's new clinical results.    Impression:   Primary osteoarthritis of left knee      Plan: TOTAL KNEE ARTHROPLASTY - LEFT - Left      Vineet Mcknight, APRN   5/24/2023   09:13 EDT

## 2023-05-24 NOTE — ANESTHESIA POSTPROCEDURE EVALUATION
Patient: Stephani Tovar    Procedure Summary     Date: 05/24/23 Room / Location:  [facility]    Anesthesia Start: 0931 Anesthesia Stop: 1148    Procedure: TOTAL KNEE ARTHROPLASTY - LEFT (Left: Knee) Diagnosis:       Primary osteoarthritis of left knee      (Primary osteoarthritis of left knee [M17.12])    Surgeons: Jonas Lipscomb MD Provider: Uday Chavez MD    Anesthesia Type: spinal ASA Status: 3          Anesthesia Type: spinal    Vitals          Post Anesthesia Care and Evaluation    Patient location during evaluation: PACU  Patient participation: complete - patient participated  Level of consciousness: awake and alert  Pain management: adequate    Airway patency: patent  Anesthetic complications: No anesthetic complications  PONV Status: none  Cardiovascular status: hemodynamically stable and acceptable  Respiratory status: nonlabored ventilation, acceptable and room air  Hydration status: acceptable

## 2023-05-24 NOTE — DISCHARGE INSTRUCTIONS
"DISCHARGE INSTRUCTIONS   Dr. Lipscomb     Total Knee Replacement/ Partial (Uni) Knee Replacement     Wound Care   1) Keep wound / incision area clean and dry.   2) Dressing to remain in place until post-operative day 7. Upon dressing removal, assess for wound drainage. If no drainage is present, keep wound / incision area open to air as much as possible. If drainage is present, place sterile dressing to cover wound and assess daily. If drainage continues to occur after post-operative day 14, call the office for an urgent appointment. (You should be seen in the clinic within 1-2 days of calling). DO NOT REMOVE SUTURES (IF PRESENT) UNDER ANY CIRCUMSTANCES PRIOR TO FOLLOW UP APPOINTMENT.  3) No baths or swimming until otherwise instructed. The wound must remain dry for 10 days after surgery. After 10 days, you may begin to shower only if no drainage is present. No submerging the wound under standing water until cleared by your physician (no baths, hot tubs, swimming pools, etc). Sponge baths are the best way to perform personal hygiene while at the same time protecting the wound from moisture.   4) Prior to showering, the wound must remain dry for 72 consecutive hours (no drainage whatsoever) prior to showering. If the wound drains or spots, the clock \"resets\" - make sure the wound has been drainage-free for 72 consecutive hours.   5) Once you are allowed to get the wound wet, please use gentle soap to wash the wound area. DO NOT aggressively scrub the wound with a washcloth or bath sponge. Please visually inspect your wound(s) at least once daily. If the wound(s) are in a difficult to see location, please use a mirror or have someone else assist with visual inspection.   6) No scrubbing the wound. You may \"pad dry\" the wound, but do not rub, as this may open up the wound and pre-dispose to wound infection.   7) Do not apply lotions or creams to incision site, unless instructed otherwise.   8) Observe for redness, " "swelling, or drainage. Please call the clinic immediately if you have fevers, chills with warmth/redness surrounding wound site or if you notice pus drainage from the wound site     Activity   No heavy lifting objects greater than 10 pounds.   No driving while on narcotic pain medication.   No submerging wound under standing water (pool, bath tub, etc.) until otherwise instructed.   You may be protected weightbearing as tolerated on your operative (left lower) extremity   Use crutches or a walker for ambulation.   Wean as appropriate per physical therapist's discretion.   Do not sleep with a pillow behind your knee. You may sleep with a pillow behind your Achilles or foot. This will prevent your knee from getting stiff in the flexed (\"bent\") position and will encourage full extension (leg straightening).   Be vigilant in terms of working on full knee extension and flexion. Your goal should be 0 to 90 degrees by 2 weeks post-op - MINIMUM!   Knee range of motion as tolerated.    Blood Clot Prophylaxis   (Aspirin vs. Lovenox vs. Eliquis administration is determined by your surgeon and tailored to your specific risk profile. You will be discharged with one of these medications.) You will need to complete a total 4 week course of enteric coated aspirin 325 mg (or 81mg) twice daily or Eliquis 2.5mg twice daily, in order to minimize your risk of blood clots following surgery. You will be supplied with a prescription to obtain this. Alternatively, you will need to compete a total 2 week course of Lovenox after surgery (followed by a 2 week course of aspirin twice daily), in order to minimize the risk of blood clots following surgery. Lovenox requires a single shot in the abdomen, to be taken once daily. You will be supplied with the prescription to obtain this. Prior to your discharge from the hospital, the nursing staff will instruct you on self-administration of the Lovenox, if you will be returning directly home from the " "hospital.     Discharge Pain Medications   You will be given a prescription for pain medication. You should start taking this the same day after your surgery. Wean off as tolerated. Do not wait to take the pain medication until the pain is severe, as it will be difficult to \"catch up\" once this occurs. The pain medication usually reaches its full effect ~1 hour after ingesting. If you have been sent home on Colace, this medication should be taken until you are off all narcotic (i.e. Oxycodone, etc) pain medications, in order to prevent constipation. If you have been sent home with a combination of oxycodone and Tylenol, please take Tylenol as scheduled.  You must be careful not to exceed 4,000mg (4 grams) of Tylenol. The oxycodone is to be taken as needed for \"breakthrough\" pain.  Some common side effects of the narcotic pain medications include nausea and itching. Benadryl is a great over the counter medication that helps calm your stomach, decreases your anxiety levels, and minimizes the itching. You can easily purchase this at your local pharmacy as an over-the-counter medication. Please abide by the instructions as printed on the bottle. If your nausea persists, make sure to take small amounts of crackers or other lighter foods.     Follow-Up   Follow-up with Dr. Lipscomb's office in 3 weeks from the surgery date for a post-operative evaluation. Have the following xrays done upon arrival to the follow-up appointment: 3 views of operative knee. Please call Dr. Lipscomb's office at [phone] for orthopaedic appointments or questions.SMI COLD THERAPY - PATIENT INSTRUCTION SHEET    Cold Compression Therapy for your comfort and rehabilitation  Your caregivers want you to be productive in your rehab and comfortable during your stay. In keeping with those goals, you will be receiving an SMI Cold Therapy Wrap to help ease post-operative pain and swelling that might keep you from getting back on track! Your SMI Cold " Therapy Wrap is effective and simple-to-use, and you will be encouraged to apply it throughout your hospital stay and at home through the duration of your recovery.    When you are ready to go home  Be sure to take your SMI Cold Therapy Wrap and both sets of Gel Bags with you for continued comfort and use throughout your rehabilitation. If you don't already have them, ask your nurse or aide to retrieve your SMI Gel Bags from the patient freezer.    Home use precautions  Always follow your medical professional's application instructions upon discharge. Your SMI Cold Therapy Wrap and Gel Bags are designed to last for months following your surgery. Never heat the Gel Bags unless specified by your healthcare provider. Supervision is advised when using this product on children or geriatric patients. To avoid danger of suffocation, please keep the outer plastic packaging away from children & pets.    Cold Therapy Instructions  Place Gel Bags in a freezer set ¾ of the way to max temperature for at least (4) hours. For best results, lay the Gel Bags flat and alme-no-utuu in the freezer. Once frozen, slide Gel Bags into the gel pouch and secure your wrap to the affected area with the straps.  Gel wraps that have been stored in a freezer for an extended period of time may require a (10) minute period of softening up in a room temperature environment before application.  The gel pouch acts as a protective barrier. NEVER place frozen bags directly onto skin, as this may cause frostbite injury.  The SMI Cold Therapy Wrap is designed to be able to be worm while ambulating. The compression straps can be secured well enough so that the Wrap won't fall off while moving.  Wrap Application Videos can be viewed at [email or web address].  An additional protective barrier such as clothing, a washcloth, hand-towel or pillowcase may be used during prolonged treatment applications.  The Gel-Pouch and Wrap are both Latex-Free and the Gel  Bag ingredients are non toxic.    [facility] Wrap care instructions  The [facility] Cold Therapy Wrap may be hand washed and hung to dry when needed.    [facility] re-order information  Additional [facility] body specific wraps and/or Gel Bags can be re-ordered from [email or web address] or call [phone] ([phone])   InfuBLOCK - Patient Information    What is a pain pump?  InfuBLOCK is a postoperative, non-narcotic pain relief system that delivers local anesthetic to or near the surgical site. This is a pain minimizing therapy that delivers an anesthetic (numbing) medicine to the nerve.    The InfuBLOCK pain pump will continuously deliver a local anesthetic medication to block the pain in the area of your procedure.    Where can I find information about my pain pump?           For more information about your pain pump, scan the QR code.  For additional patient resources, visit [email or web address].                                                                                             The [employer] Nursing Hotline is Here for You 24/7.     Call [phone] for Assistance.  While your physician is your primary source for information about your treatment., there may be times during your treatment that you need assistance with your infusion pump. Our team of compassionate and knowledgeable Registered Nursed (RN) is here to assist every step of the way.    Answers to questions about your infusion pump                 Tubing disconnect  Assistance with pump alarms                                                      Dislodged catheter  Excessive leakage noted from pump                                         Inadequate pain control   Nerve Catheter Removal Instructions  When your device is empty:    Remove your catheter by pulling the dressing off slowly (like you would remove a regular bandage). The catheter should pull right out of the skin.  Check that the BLUE tip is intact.                                                                                      If the catheter is stuck, reposition your   extremity and pull slowly until removed.  *If catheter is HURTING and WON'T come out, stop and call [phone] for further assistance.    Remove medication bag from the black carrying case.  Cut the tubing on right and left side of pump, and discard the medication bag and tubing into garbage.  Place the pump and black carrying case into the plastic bag and then place this into the return box.  Seal box with blue stickers and return to [employer] postal service.    THIS IS PRE-PAID POSTAGE.

## 2023-05-25 ENCOUNTER — TELEPHONE (OUTPATIENT)
Dept: ORTHOPEDIC SURGERY | Facility: CLINIC | Age: 68
End: 2023-05-25
Payer: MEDICARE

## 2023-05-25 DIAGNOSIS — Z96.652 S/P TOTAL KNEE ARTHROPLASTY, LEFT: Primary | ICD-10-CM

## 2023-05-25 NOTE — TELEPHONE ENCOUNTER
[facility] called and wanted an order & protocol for PT faxxed over to their clinic. You can fax it to [phone]. Their number is [phone]

## 2023-05-26 LAB — REF LAB TEST RESULTS: NORMAL

## 2023-06-13 ENCOUNTER — OFFICE VISIT (OUTPATIENT)
Dept: ORTHOPEDIC SURGERY | Facility: CLINIC | Age: 68
End: 2023-06-13
Payer: MEDICARE

## 2023-06-13 VITALS — TEMPERATURE: 97.5 F

## 2023-06-13 DIAGNOSIS — Z96.652 S/P TOTAL KNEE ARTHROPLASTY, LEFT: Primary | ICD-10-CM

## 2023-06-13 NOTE — PROGRESS NOTES
[facility]opa[facility] Clinic Note        Subjective     Post-op (3 weeks s/p Left total knee arthroplasty 5/24/23)       HPI    Stephani Tovar is a 67 y.o. female.  Patient presents for their initial postop visit following left TKA performed on the above date by Dr. Lipscomb.    Pain scale: 6/10. Using a cane to assist with ambulation. Associated symptom--stiffness. Walking causes increased pain. Attending OPPT.     Patient denies any fever, chills, night sweats or other constitutional symptoms.          Objective      Physical Exam  Temp 97.5 °F (36.4 °C)     There is no height or weight on file to calculate BMI.        Ortho Exam  Integument:   Left knee: Incision is healing well without redness, warmth, drainage or signs of infection.    Lower Extremities:   Left Knee:    Tenderness:  Generalized tenderness throughout knee    Effusion:  1+    Swelling: Mild    Crepitus:  None    Range of motion:  Extension: 0°       Flexion: 110°  Instability:  No varus laxity, no valgus laxity, negative anterior drawer  Deformities:  None      Imaging Reviewed:  Ordered left knee plain films.  Imaging read/interpreted by Dr. Lipscomb.    Imaging Results (Last 24 Hours)       Procedure Component Value Units Date/Time    XR Knee 3+ View With [place] Left [[id number]] Resulted: 06/13/23 1346     Updated: 06/13/23 1346    Narrative:      Indication: Status post left total knee arthroplasty    Comparison: Todays xrays were compared to previous xrays from 5/24/2023      Impression:           Left Knee: Demonstrate well positioned knee arthroplasty components in   satisfactory alignment without evidence of wear, loosening, subsidence,   fracture, or osteolysis and No significant changes compared to prior   radiographs.              Assessment:  1. S/P total knee arthroplasty, left        Plan:  Status post left TKA, stable.  Reviewed imaging with patient.  Continue with outpatient PT.  Refilled pain medication but with  tramadol.  Has liposuction appointment 7/20/2023--to determine whether to keep or delay at next ortho appointment.   Ok to proceed with flying, manicure/pedicure after 6 week appointment.  Continue with ASA as directed for DVT prophylaxis.  Follow up with Dr. Lipscomb in 3 weeks for repeat evaluation. Needs knee imaging.   Questions and concerns answered.      Fiona Mora PA-C  06/13/23  20:50 EDT      Dictated Utilizing Dragon Dictation.

## 2023-06-14 RX ORDER — TRAMADOL HYDROCHLORIDE 50 MG/1
50 TABLET ORAL EVERY 6 HOURS PRN
Qty: 30 TABLET | Refills: 0 | Status: SHIPPED | OUTPATIENT
Start: 2023-06-14

## 2023-06-19 ENCOUNTER — TELEPHONE (OUTPATIENT)
Dept: INTERNAL MEDICINE | Facility: CLINIC | Age: 68
End: 2023-06-19

## 2023-06-19 RX ORDER — SEMAGLUTIDE 1.34 MG/ML
1 INJECTION, SOLUTION SUBCUTANEOUS WEEKLY
Qty: 3 ML | Refills: 5 | Status: SHIPPED | OUTPATIENT
Start: 2023-06-19

## 2023-06-19 NOTE — TELEPHONE ENCOUNTER
Caller: Stephani Bhardwaj    Relationship: Self    Best call back number: [phone]    What medication are you requesting: OZEMPIC 1.0MG    If a prescription is needed, what is your preferred pharmacy and phone number:    [facility] [phone]  Additional notes:  PATIENT NEEDS NEXT DOSAGE OF OZEMPIC 1.0MG. PLEASE SEND TO PHARMACY

## 2023-07-17 ENCOUNTER — TELEPHONE (OUTPATIENT)
Dept: INTERNAL MEDICINE | Facility: CLINIC | Age: 68
End: 2023-07-17

## 2023-07-17 NOTE — TELEPHONE ENCOUNTER
Caller: Stephani Bhardwaj    Relationship: Self    Best call back number: [phone]       What was the call regarding: Semaglutide, 1 MG/DOSE, (Ozempic, 1 MG/DOSE,) 4 MG/3ML solution pen-injector     PATIENT IS READY TO GO UP A DOSE IN THIS MEDICATION. PLEASE CALL INTO:    [id number] - [place], [place] - [address] AT [facility] [phone]  - [phone]  [phone]       rosuvastatin (CRESTOR) 5 MG tablet     PATIENT STATES THAT SHE IS OUT OF ENERGY SINCE TAKING THIS MEDICATION, RECOVERING FROM KNEE REPLACEMENT. PATIENT THINKS THAT THIS MEDICATION MAY BE TAKING A PART IN HER LACK OF ENERGY      Is it okay if the provider responds through MyChart: NO

## 2023-08-09 ENCOUNTER — TELEPHONE (OUTPATIENT)
Dept: INTERNAL MEDICINE | Facility: CLINIC | Age: 68
End: 2023-08-09
Payer: MEDICARE

## 2023-08-09 RX ORDER — PRAVASTATIN SODIUM 10 MG
10 TABLET ORAL NIGHTLY
Qty: 30 TABLET | Refills: 5 | Status: SHIPPED | OUTPATIENT
Start: 2023-08-09

## 2023-08-09 NOTE — TELEPHONE ENCOUNTER
PATIENT STATES THAT HER ENERGY LEVEL IS BACK UP. SHE ISOLATED THIS TO CRESTOR THAT WAS THE ISSUE. SHE WOULD LIKE TO INCREASE THE DOSE OF HER OZEMPIC TO 2 MG. THE ONLY SIDE EFFECT SHE HAS FROM OZEMPIC IS CONSTIPATION. SHE'S USING THE BATHROOM JUST NOT REGULARLY.    PATIENT ALSO STARTED A NEW MEDICATION GEMTESA 75 MG FOR URINARY RETENTION.    PHONE: [phone]

## 2023-08-10 NOTE — TELEPHONE ENCOUNTER
Patient understood and verbalized understanding.    She requested any needed labwork order to be placed for her upcoming appt in Oct. She states her endocrinologist placed a lab order for her lipids recently and will send over the lab work as soon as she has it completed

## 2023-09-05 ENCOUNTER — OFFICE VISIT (OUTPATIENT)
Dept: ORTHOPEDIC SURGERY | Facility: CLINIC | Age: 68
End: 2023-09-05
Payer: MEDICARE

## 2023-09-05 VITALS
HEIGHT: 65 IN | BODY MASS INDEX: 28.66 KG/M2 | DIASTOLIC BLOOD PRESSURE: 82 MMHG | SYSTOLIC BLOOD PRESSURE: 126 MMHG | WEIGHT: 172 LBS

## 2023-09-05 DIAGNOSIS — Z96.652 S/P TOTAL KNEE ARTHROPLASTY, LEFT: Primary | ICD-10-CM

## 2023-09-05 ASSESSMENT — KOOS JR
KOOS JR SCORE: 84.6
KOOS JR SCORE: 2

## 2023-09-05 NOTE — PROGRESS NOTES
Orthopaedic Clinic Note: Knee Established Patient    Chief Complaint   Patient presents with    Follow-up     2 month follow up - 3 months S/P total knee arthroplasty, left        HPI    It has been 2  month(s) since Ms. Rg Tovar's last visit. She returns to clinic today for follow-up left total knee arthroplasty.  She is 3-1/2 months out from surgery.  Rates her pain 3/10 on the pain scale.  She does have some minor discomfort especially at night along the medial aspect of the knee.  She is ambulating with no assistive device.  Has completed outpatient physical therapy.  Denies fevers chills or constitutional symptoms.  Overall she is happy with her outcome.     Past Medical History:   Diagnosis Date    Arthritis     Arthritis of neck 2009    Breast injury 03/2017    RT BREAST HIT S/P FALL    Cervical disc disorder 2009    Cervical    Frozen shoulder 2009    Right shoulder    GERD (gastroesophageal reflux disease)     Heartburn     Hip arthrosis 2023    Right hip    Hyperlipidemia     Knee swelling 2022    Left knee    PONV (postoperative nausea and vomiting)     zofran and phen help pt    Psoriasis     scalp    Rotator cuff syndrome 2009    Right shoulder    Scoliosis 8 years ago    Tear of meniscus of knee 2013    LEFT    Wears glasses     readers      Past Surgical History:   Procedure Laterality Date    AUGMENTATION MAMMAPLASTY Bilateral 01/2017    EXPLANTED & REPLACED 04/20/2022    BREAST EXCISIONAL BIOPSY Bilateral 1980    CHOLECYSTECTOMY      with hysterectomy    COLONOSCOPY      FOOT SURGERY  2013    Bilateral bunionectomy . 3rd toe right foot tendon release    HYSTERECTOMY  2004    total    KNEE SURGERY Left 2010    Torn meniscus Left    OOPHORECTOMY Bilateral 2004    REDUCTION MAMMAPLASTY Bilateral 2013    WITH MASTOPEXY    SHOULDER SURGERY Right 2009    Torn rotator cuff repair right    TONSILLECTOMY      TOTAL KNEE ARTHROPLASTY Left 5/24/2023    Procedure: TOTAL KNEE ARTHROPLASTY - LEFT;   Surgeon: Jonas Lipscomb MD;  Location: [facility];  Service: Orthopedics;  Laterality: Left;    WISDOM TOOTH EXTRACTION        Family History   Problem Relation Age of Onset    Arthritis Mother     Hyperlipidemia Mother     Osteoporosis Mother     Hyperlipidemia Father     Diabetes Father     Brain cancer Father     Hyperlipidemia Sister     Hyperlipidemia Sister     Hyperlipidemia Brother     Coronary artery disease Brother     Liver disease Brother     Rheumatologic disease Maternal Aunt     Ovarian cancer Neg Hx     Breast cancer Neg Hx      Social History     Socioeconomic History    Marital status:    Tobacco Use    Smoking status: Never    Smokeless tobacco: Never   Vaping Use    Vaping Use: Never used   Substance and Sexual Activity    Alcohol use: Yes     Alcohol/week: 2.0 standard drinks     Types: 2 Glasses of wine per week     Comment: 2-3 drinks per week    Drug use: No    Sexual activity: Not Currently     Partners: Male     Birth control/protection: None      Current Outpatient Medications on File Prior to Visit   Medication Sig Dispense Refill    acetaminophen (TYLENOL) 500 MG tablet Take 2 tablets by mouth Every 8 (Eight) Hours. 42 tablet 0    Ascorbic Acid (VITAMIN C PO) 1000mg once daily      Coenzyme Q10 400 MG capsule Take 1 capsule by mouth Every Night. 90 capsule 3    CREAM BASE EX Apply  topically to the appropriate area as directed. Apply topically Testosterone 0.3mg/progesterone 25mg/0.5ml TD daily in syringes OK TO FILL 4 MONTH SUPPLY .      estradiol (MINIVELLE, VIVELLE-DOT) 0.05 MG/24HR patch 1 patch 2 (Two) Times a Week.      fluocinonide (LIDEX) 0.05 % external solution Apply  topically to the appropriate area as directed 2 (Two) Times a Day. 60 mL 5    loratadine (CLARITIN) 10 MG tablet Take 1 tablet by mouth Daily.      meloxicam (MOBIC) 15 MG tablet 1 PO Daily with food.  Stop if you have upset stomach/stomach irritation. 30 tablet 1    metFORMIN (GLUCOPHAGE) 500 MG tablet  "Take 2 tablets by mouth Daily With Dinner. 2 tablets every evening      metroNIDAZOLE (METROCREAM) 0.75 % cream APPLY TOPICALLY TO FACE TWICE DAILY FOR ROSACEA      NON FORMULARY Indoplex w/DIM  1 capsule daily      NON FORMULARY Reservatrol  Once daily      Nutritional Supplements (DHEA PO) Pt takes 5mg daily      omeprazole (priLOSEC) 40 MG capsule Take 1 capsule by mouth Daily. 90 capsule 1    pravastatin (PRAVACHOL) 10 MG tablet Take 1 tablet by mouth Every Night. 30 tablet 5    Semaglutide, 2 MG/DOSE, (OZEMPIC) 8 MG/3ML solution pen-injector Inject 2 mg under the skin into the appropriate area as directed 1 (One) Time Per Week. 3 mL 5    Triamcinolone Acetonide (NASACORT ALLERGY 24HR NA) Daily As Needed. 2 sprays in each nostril per day      Vibegron 75 MG tablet Take 1 tablet by mouth Daily. 30 tablet      No current facility-administered medications on file prior to visit.      Allergies   Allergen Reactions    Statins Myalgia     Other reaction(s): Myalgias (Muscle Pain)          Review of Systems   Constitutional: Negative.    HENT: Negative.     Eyes: Negative.    Respiratory: Negative.     Cardiovascular: Negative.    Gastrointestinal: Negative.    Endocrine: Negative.    Genitourinary: Negative.    Musculoskeletal:  Positive for arthralgias.   Skin: Negative.    Allergic/Immunologic: Negative.    Neurological: Negative.    Hematological: Negative.    Psychiatric/Behavioral: Negative.        The patient's Review of Systems was personally reviewed and confirmed as accurate.    Physical Exam  Blood pressure 126/82, height 165.1 cm (65\"), weight 78 kg (172 lb), not currently breastfeeding.    Body mass index is 28.62 kg/m².    GENERAL APPEARANCE: awake, alert, oriented, in no acute distress and well developed, well nourished  LUNGS:  breathing nonlabored  EXTREMITIES: no clubbing, cyanosis  PERIPHERAL PULSES: palpable dorsalis pedis and posterior tibial pulses bilaterally.    GAIT:  Normal      "   ----------  Left Knee Exam:  ----------  ALIGNMENT: neutral  ----------  RANGE OF MOTION:  Decreased (0 - 115 degrees) with no extensor lag  LIGAMENTOUS STABILITY:   stable to varus and valgus stress at terminal extension and 30 degrees without any evidence of laxity  ----------  STRENGTH:  KNEE FLEXION 5/5  KNEE EXTENSION  5/5  ANKLE DORSIFLEXION  5/5  ANKLE PLANTARFLEXION  5/5  ----------  PAIN WITH PALPATION:denies tenderness to palpation about the knee  KNEE EFFUSION: yes, trace effusion  PAIN WITH KNEE ROM: no  PATELLAR CREPITUS:  no  ----------  SENSATION TO LIGHT TOUCH:  DEEP PERONEAL/SUPERFICIAL PERONEAL/SURAL/SAPHENOUS/TIBIAL:    intact  ----------  EDEMA:  no  ERYTHEMA:    no  WOUNDS/INCISIONS:   yes, well healed surgical incision without evidence of erythema or drainage  _____________________________________________________________________  _____________________________________________________________________    RADIOGRAPHIC FINDINGS:   Indication: Status post left total knee arthroplasty    Comparison: Todays xrays were compared to previous xrays from 7/6/2023    Knee films: Demonstrate well positioned knee arthroplasty components in satisfactory alignment without evidence of wear, loosening, subsidence, fracture, or osteolysis and No significant changes compared to prior radiographs.    Assessment/Plan:   Diagnosis Plan   1. S/P total knee arthroplasty, left  XR Knee 3+ View With [place] Left        Patient is doing well 3 and half month status post left total knee arthroplasty.  Encouraged patient continue working on gait training and strengthening.  Her residual discomfort will gradually improve with time.  Continue activity as tolerated without restrictions.  I will see the patient back in 9 months for repeat assessment x-ray 3 views left knee on return.  She is welcome to follow-up sooner should problems arise.    I recommend prophylactic antibiotics prior to invasive procedures indefinitely to  minimize risk of prosthetic joint infection.  Amoxicillin 2 g orally 1 hour prior to procedure is recommended.      Jonas Lipscomb MD  09/05/23  11:57 EDT

## 2023-10-09 ENCOUNTER — TELEPHONE (OUTPATIENT)
Dept: INTERNAL MEDICINE | Facility: CLINIC | Age: 68
End: 2023-10-09
Payer: MEDICARE

## 2023-10-09 RX ORDER — AMOXICILLIN AND CLAVULANATE POTASSIUM 875; 125 MG/1; MG/1
1 TABLET, FILM COATED ORAL 2 TIMES DAILY
Qty: 14 TABLET | Refills: 0 | Status: SHIPPED | OUTPATIENT
Start: 2023-10-09

## 2023-10-09 NOTE — TELEPHONE ENCOUNTER
Shayan stated she has just got back from [place] , they took a wine tour and it was moldy. Patient stated she is allergic to mold and now she is having heavy chest congestion , coughing up yellowish green mucous , post nasal drip that is causing her throat to be sore , SOB w/ exertion , and sinus pressure. She did a home covid test she was negative , has been using Nasacort and Claritin wants to know if you would send in an antibiotic since she is 1.5 hours away. She said she could do a video visit if needed.

## 2023-10-13 ENCOUNTER — LAB (OUTPATIENT)
Dept: LAB | Facility: HOSPITAL | Age: 68
End: 2023-10-13
Payer: MEDICARE

## 2023-10-13 DIAGNOSIS — R79.9 ABNORMAL FINDING OF BLOOD CHEMISTRY, UNSPECIFIED: ICD-10-CM

## 2023-10-13 DIAGNOSIS — E03.9 HYPOTHYROIDISM, ADULT: ICD-10-CM

## 2023-10-13 DIAGNOSIS — E78.2 MIXED HYPERLIPIDEMIA: Primary | ICD-10-CM

## 2023-10-13 DIAGNOSIS — E55.9 VITAMIN D DEFICIENCY: ICD-10-CM

## 2023-10-13 DIAGNOSIS — E88.810 METABOLIC SYNDROME: Chronic | ICD-10-CM

## 2023-10-13 LAB
25(OH)D3 SERPL-MCNC: 72.6 NG/ML (ref 30–100)
ALBUMIN SERPL-MCNC: 4.9 G/DL (ref 3.5–5.2)
ALBUMIN SERPL-MCNC: 5.3 G/DL (ref 3.5–5.2)
ALBUMIN UR-MCNC: <1.2 MG/DL
ALBUMIN/GLOB SERPL: 2 G/DL
ALBUMIN/GLOB SERPL: 2.5 G/DL
ALP SERPL-CCNC: 70 U/L (ref 39–117)
ALP SERPL-CCNC: 76 U/L (ref 39–117)
ALT SERPL W P-5'-P-CCNC: 23 U/L (ref 1–33)
ALT SERPL W P-5'-P-CCNC: 24 U/L (ref 1–33)
ANION GAP SERPL CALCULATED.3IONS-SCNC: 10.8 MMOL/L (ref 5–15)
ANION GAP SERPL CALCULATED.3IONS-SCNC: 12.8 MMOL/L (ref 5–15)
AST SERPL-CCNC: 22 U/L (ref 1–32)
AST SERPL-CCNC: 25 U/L (ref 1–32)
BACTERIA UR QL AUTO: ABNORMAL /HPF
BASOPHILS # BLD AUTO: 0.04 10*3/MM3 (ref 0–0.2)
BASOPHILS NFR BLD AUTO: 0.5 % (ref 0–1.5)
BILIRUB SERPL-MCNC: 0.7 MG/DL (ref 0–1.2)
BILIRUB SERPL-MCNC: 0.7 MG/DL (ref 0–1.2)
BILIRUB UR QL STRIP: NEGATIVE
BUN SERPL-MCNC: 16 MG/DL (ref 8–23)
BUN SERPL-MCNC: 18 MG/DL (ref 8–23)
BUN/CREAT SERPL: 20 (ref 7–25)
BUN/CREAT SERPL: 24 (ref 7–25)
CALCIUM SPEC-SCNC: 9.8 MG/DL (ref 8.6–10.5)
CALCIUM SPEC-SCNC: 9.8 MG/DL (ref 8.6–10.5)
CHLORIDE SERPL-SCNC: 102 MMOL/L (ref 98–107)
CHLORIDE SERPL-SCNC: 103 MMOL/L (ref 98–107)
CHOLEST SERPL-MCNC: 179 MG/DL (ref 0–200)
CLARITY UR: CLEAR
CO2 SERPL-SCNC: 23.2 MMOL/L (ref 22–29)
CO2 SERPL-SCNC: 24.2 MMOL/L (ref 22–29)
COLOR UR: YELLOW
CREAT SERPL-MCNC: 0.75 MG/DL (ref 0.57–1)
CREAT SERPL-MCNC: 0.8 MG/DL (ref 0.57–1)
CREAT UR-MCNC: 38.6 MG/DL
DEPRECATED RDW RBC AUTO: 38.8 FL (ref 37–54)
EGFRCR SERPLBLD CKD-EPI 2021: 81.9 ML/MIN/1.73
EGFRCR SERPLBLD CKD-EPI 2021: 88.5 ML/MIN/1.73
EOSINOPHIL # BLD AUTO: 0.07 10*3/MM3 (ref 0–0.4)
EOSINOPHIL NFR BLD AUTO: 0.9 % (ref 0.3–6.2)
ERYTHROCYTE [DISTWIDTH] IN BLOOD BY AUTOMATED COUNT: 12.7 % (ref 12.3–15.4)
GLOBULIN UR ELPH-MCNC: 2.1 GM/DL
GLOBULIN UR ELPH-MCNC: 2.4 GM/DL
GLUCOSE SERPL-MCNC: 84 MG/DL (ref 65–99)
GLUCOSE SERPL-MCNC: 86 MG/DL (ref 65–99)
GLUCOSE UR STRIP-MCNC: NEGATIVE MG/DL
HBA1C MFR BLD: 5.1 % (ref 4.8–5.6)
HCT VFR BLD AUTO: 46.3 % (ref 34–46.6)
HDLC SERPL-MCNC: 51 MG/DL (ref 40–60)
HGB BLD-MCNC: 15.5 G/DL (ref 12–15.9)
HGB UR QL STRIP.AUTO: NEGATIVE
HYALINE CASTS UR QL AUTO: ABNORMAL /LPF
IMM GRANULOCYTES # BLD AUTO: 0.02 10*3/MM3 (ref 0–0.05)
IMM GRANULOCYTES NFR BLD AUTO: 0.3 % (ref 0–0.5)
KETONES UR QL STRIP: NEGATIVE
LDLC SERPL CALC-MCNC: 114 MG/DL (ref 0–100)
LDLC/HDLC SERPL: 2.22 {RATIO}
LEUKOCYTE ESTERASE UR QL STRIP.AUTO: NEGATIVE
LYMPHOCYTES # BLD AUTO: 1.8 10*3/MM3 (ref 0.7–3.1)
LYMPHOCYTES NFR BLD AUTO: 23.6 % (ref 19.6–45.3)
MCH RBC QN AUTO: 28.4 PG (ref 26.6–33)
MCHC RBC AUTO-ENTMCNC: 33.5 G/DL (ref 31.5–35.7)
MCV RBC AUTO: 84.8 FL (ref 79–97)
MICROALBUMIN/CREAT UR: NORMAL MG/G{CREAT}
MONOCYTES # BLD AUTO: 0.73 10*3/MM3 (ref 0.1–0.9)
MONOCYTES NFR BLD AUTO: 9.6 % (ref 5–12)
NEUTROPHILS NFR BLD AUTO: 4.98 10*3/MM3 (ref 1.7–7)
NEUTROPHILS NFR BLD AUTO: 65.1 % (ref 42.7–76)
NITRITE UR QL STRIP: NEGATIVE
NRBC BLD AUTO-RTO: 0 /100 WBC (ref 0–0.2)
PH UR STRIP.AUTO: 6 [PH] (ref 5–8)
PLATELET # BLD AUTO: 286 10*3/MM3 (ref 140–450)
PMV BLD AUTO: 9.7 FL (ref 6–12)
POTASSIUM SERPL-SCNC: 4.2 MMOL/L (ref 3.5–5.2)
POTASSIUM SERPL-SCNC: 4.2 MMOL/L (ref 3.5–5.2)
PROT SERPL-MCNC: 7.3 G/DL (ref 6–8.5)
PROT SERPL-MCNC: 7.4 G/DL (ref 6–8.5)
PROT UR QL STRIP: NEGATIVE
RBC # BLD AUTO: 5.46 10*6/MM3 (ref 3.77–5.28)
RBC # UR STRIP: ABNORMAL /HPF
REF LAB TEST METHOD: ABNORMAL
SODIUM SERPL-SCNC: 137 MMOL/L (ref 136–145)
SODIUM SERPL-SCNC: 139 MMOL/L (ref 136–145)
SP GR UR STRIP: 1.01 (ref 1–1.03)
SQUAMOUS #/AREA URNS HPF: ABNORMAL /HPF
T3FREE SERPL-MCNC: 3.15 PG/ML (ref 2–4.4)
T4 FREE SERPL-MCNC: 1.52 NG/DL (ref 0.93–1.7)
TRIGL SERPL-MCNC: 74 MG/DL (ref 0–150)
TSH SERPL DL<=0.05 MIU/L-ACNC: 1.83 UIU/ML (ref 0.27–4.2)
TSH SERPL DL<=0.05 MIU/L-ACNC: 1.9 UIU/ML (ref 0.27–4.2)
UROBILINOGEN UR QL STRIP: NORMAL
VLDLC SERPL-MCNC: 14 MG/DL (ref 5–40)
WBC # UR STRIP: ABNORMAL /HPF
WBC NRBC COR # BLD: 7.64 10*3/MM3 (ref 3.4–10.8)

## 2023-10-13 PROCEDURE — 80053 COMPREHEN METABOLIC PANEL: CPT

## 2023-10-13 PROCEDURE — 81001 URINALYSIS AUTO W/SCOPE: CPT

## 2023-10-13 PROCEDURE — 84481 FREE ASSAY (FT-3): CPT

## 2023-10-13 PROCEDURE — 84443 ASSAY THYROID STIM HORMONE: CPT

## 2023-10-13 PROCEDURE — 82570 ASSAY OF URINE CREATININE: CPT

## 2023-10-13 PROCEDURE — 84439 ASSAY OF FREE THYROXINE: CPT

## 2023-10-13 PROCEDURE — 36415 COLL VENOUS BLD VENIPUNCTURE: CPT

## 2023-10-13 PROCEDURE — 85025 COMPLETE CBC W/AUTO DIFF WBC: CPT

## 2023-10-13 PROCEDURE — 82043 UR ALBUMIN QUANTITATIVE: CPT

## 2023-10-13 PROCEDURE — 80061 LIPID PANEL: CPT

## 2023-10-13 PROCEDURE — 83704 LIPOPROTEIN BLD QUAN PART: CPT

## 2023-10-13 PROCEDURE — 83036 HEMOGLOBIN GLYCOSYLATED A1C: CPT

## 2023-10-13 PROCEDURE — 82306 VITAMIN D 25 HYDROXY: CPT

## 2023-10-15 LAB
CHOLEST SERPL-MCNC: 185 MG/DL (ref 100–199)
HDL SERPL-SCNC: 32.8 UMOL/L
HDLC SERPL-MCNC: 53 MG/DL
LDL SERPL QN: 20.3 NM
LDL SERPL-SCNC: 1676 NMOL/L
LDL SMALL SERPL-SCNC: 991 NMOL/L
LDLC SERPL CALC-MCNC: 117 MG/DL (ref 0–99)
TRIGL SERPL-MCNC: 82 MG/DL (ref 0–149)

## 2023-10-16 ENCOUNTER — TELEPHONE (OUTPATIENT)
Dept: ORTHOPEDIC SURGERY | Facility: CLINIC | Age: 68
End: 2023-10-16
Payer: MEDICARE

## 2023-10-16 RX ORDER — AMOXICILLIN 500 MG/1
2000 TABLET, FILM COATED ORAL ONCE
Qty: 4 TABLET | Refills: 0 | Status: SHIPPED | OUTPATIENT
Start: 2023-10-16 | End: 2023-10-16

## 2023-10-16 NOTE — TELEPHONE ENCOUNTER
Dental prophylaxis placed.  Patient does not require antibiotics prior to having an eye procedure.

## 2023-10-16 NOTE — TELEPHONE ENCOUNTER
PATIENT CALLED IN REQUESTING ANTIBIOTICS FOR A DENTAL CLEANING COMING UP. SHE ALSO WANTS TO MAKE SURE SHE DOESN'T NEED TO TAKE ANYTHING BEFORE HAVING AN EYE PROCEDURE DONE.     [facility] IN [place].     CALL BACK #: [phone].

## 2023-10-16 NOTE — TELEPHONE ENCOUNTER
I spoke with the patient to let her know we sent in the medication for her dental cleaning coming up. I also let her know she does not need to take anything prior to an eye appointment. The patient verbalized understanding.     Nicky Dallas

## 2023-10-19 ENCOUNTER — TELEPHONE (OUTPATIENT)
Dept: INTERNAL MEDICINE | Facility: CLINIC | Age: 68
End: 2023-10-19

## 2023-10-19 ENCOUNTER — OFFICE VISIT (OUTPATIENT)
Dept: INTERNAL MEDICINE | Facility: CLINIC | Age: 68
End: 2023-10-19
Payer: MEDICARE

## 2023-10-19 VITALS
SYSTOLIC BLOOD PRESSURE: 122 MMHG | WEIGHT: 174 LBS | DIASTOLIC BLOOD PRESSURE: 60 MMHG | OXYGEN SATURATION: 99 % | HEIGHT: 65 IN | TEMPERATURE: 98.4 F | HEART RATE: 91 BPM | BODY MASS INDEX: 28.99 KG/M2

## 2023-10-19 DIAGNOSIS — E78.2 MIXED HYPERLIPIDEMIA: Primary | Chronic | ICD-10-CM

## 2023-10-19 DIAGNOSIS — E55.9 VITAMIN D DEFICIENCY: ICD-10-CM

## 2023-10-19 DIAGNOSIS — R79.9 ABNORMAL FINDING OF BLOOD CHEMISTRY, UNSPECIFIED: ICD-10-CM

## 2023-10-19 DIAGNOSIS — M21.70 LEG LENGTH INEQUALITY: ICD-10-CM

## 2023-10-19 DIAGNOSIS — M62.830 SPASM OF MUSCLE OF LOWER BACK: Primary | ICD-10-CM

## 2023-10-19 DIAGNOSIS — E66.3 OVERWEIGHT WITH BODY MASS INDEX (BMI) OF 29 TO 29.9 IN ADULT: Chronic | ICD-10-CM

## 2023-10-19 DIAGNOSIS — R05.1 ACUTE COUGH: Chronic | ICD-10-CM

## 2023-10-19 DIAGNOSIS — E88.810 METABOLIC SYNDROME: Chronic | ICD-10-CM

## 2023-10-19 DIAGNOSIS — E66.09 CLASS 1 OBESITY DUE TO EXCESS CALORIES WITH SERIOUS COMORBIDITY AND BODY MASS INDEX (BMI) OF 32.0 TO 32.9 IN ADULT: Chronic | ICD-10-CM

## 2023-10-19 DIAGNOSIS — K21.9 GASTROESOPHAGEAL REFLUX DISEASE WITHOUT ESOPHAGITIS: Chronic | ICD-10-CM

## 2023-10-19 DIAGNOSIS — R25.2 LEG CRAMPS: ICD-10-CM

## 2023-10-19 DIAGNOSIS — L40.9 PSORIASIS OF SCALP: Chronic | ICD-10-CM

## 2023-10-19 PROBLEM — E66.811 CLASS 1 OBESITY DUE TO EXCESS CALORIES WITH SERIOUS COMORBIDITY AND BODY MASS INDEX (BMI) OF 32.0 TO 32.9 IN ADULT: Chronic | Status: RESOLVED | Noted: 2022-12-13 | Resolved: 2023-10-19

## 2023-10-19 PROCEDURE — 1159F MED LIST DOCD IN RCRD: CPT | Performed by: INTERNAL MEDICINE

## 2023-10-19 PROCEDURE — 99214 OFFICE O/P EST MOD 30 MIN: CPT | Performed by: INTERNAL MEDICINE

## 2023-10-19 PROCEDURE — 1160F RVW MEDS BY RX/DR IN RCRD: CPT | Performed by: INTERNAL MEDICINE

## 2023-10-19 NOTE — ASSESSMENT & PLAN NOTE
She is doing very well losing weight by taking Ozempic and doing regular exercise and following low-fat, low-carbohydrate diet. I sent in refills on the Ozempic.

## 2023-10-19 NOTE — TELEPHONE ENCOUNTER
Caller: Stephani Bhardwaj     Relationship: Self     Best call back number: [phone]     What is the medical concern/diagnosis: AFTER TOTAL KNEE REPLACEMENT FEELS UNEVEN. WANTS AN ADJUSTMENT.     What specialty or service is being requested: CHIROPRACTOR    What is the provider[facility] practice or medical service [facility] [place]    What is the office location: [address] [place] [address]    What is the office phone number: [phone]    Any additional details: THE PATIENT FEELS AS IF ONE LEG IS SHORTER THAN THE OTHER. PLEASE CALL TO LET HER KNOW WHEN THIS IS SENT SO SHE CAN FOLLOW UP WITH THEM.

## 2023-10-19 NOTE — ASSESSMENT & PLAN NOTE
Continue taking metformin daily. Continue Ozempic weekly. Continue regular exercise and low fat, low carbohydrate diet. Continue weight loss.

## 2023-10-19 NOTE — ASSESSMENT & PLAN NOTE
She will continue omeprazole daily. Continue taking the meloxicam with food. Continue to avoid eating close to bedtime and avoid large meals.

## 2023-10-19 NOTE — PROGRESS NOTES
[facility]     Stephani Diez Guy  1955   [id number]      Patient Care Team:  Elen Tovar MD as PCP - General (Internal Medicine)    Chief Complaint   Patient presents with    Leg Pain     The past 2 nights, both legs are in pain            HPI  Patient is a 67 y.o. female presents for a 6-month follow-up.    Chest congestion  The patient states that she experienced chest congestion last week. She finished the antibiotics on Monday, 10/16/2022. She is still mildly congested. She has a mild, dry cough in the mornings. She denies shortness of breath. She notes postnasal drip, but it is improving. She has stopped taking Mucinex.    Hyperlipidemia  The patient has been taking pravastatin with no side effects. Her most recent labs shows an LDL of 114 mg/dL. Her triglycerides are normal. She is exercising regularly.     Leg cramps  The patient complains of painful leg cramps that have been present that last couple of nights. She is taking CoQ10. She plans to start taking magnesium tonight. Her potassium is good.     Metabolic syndrome  Her average blood glucose is very good around 106 to 108 mg/dL. The Ozempic has helped her significantly. She initially had some nausea with Ozempic, but that has resolved. She request to have Ozempic sent to [facility] as she is having difficulty getting her insurance to cover it. She is losing weight. She weighed 187 pounds in 05/2023 and today, she weighs 174 pounds.     Scoliosis  The patient has scoliosis, which has worsened as she has gotten older. She is not looking for surgical intervention. She denies back pain. She was doing a lot of Pilates, but stopped as there are no Pilates classes near where she lives. She will restart in the winter.    Left total knee arthroplasty  She had a left total knee arthroplasty. It is still painful. She is still doing the physical therapy exercises. She notes stiffness and pain with the cold weather. She was  told the pain could remain present for up to 1 year after the surgery. She plans to restart horse bike riding.    Health maintenance  The patient is due for a DEXA scan.    Immunizations  The patient inquires about the RSV vaccine.    Labs  LDL- 114 mg/dL.  Triglycerides- WNL.  Potassium- WNL.  Creatinine- WNL.  Blood glucose- WNL.  LFTs- WNL.  Thyroid- WNL.  CBC- WNL.  RBC- slightly elevated.  Hemoglobin- WNL.  Hematocrit- WNL.  WBC- WNL.  Urinalysis- no blood in urine, no infection.      CHRONIC CONDITIONS      Past Medical History:   Diagnosis Date    Arthritis     Arthritis of neck 2009    Breast injury 03/2017    RT BREAST HIT S/P FALL    Cervical disc disorder 2009    Cervical    Class 1 obesity due to excess calories with serious comorbidity and body mass index (BMI) of 32.0 to 32.9 in adult     Frozen shoulder 2009    Right shoulder    GERD (gastroesophageal reflux disease)     Heartburn     Hip arthrosis 2023    Right hip    Hyperlipidemia     Knee swelling 2022    Left knee    PONV (postoperative nausea and vomiting)     zofran and phen help pt    Psoriasis     scalp    Rotator cuff syndrome 2009    Right shoulder    Scoliosis 8 years ago    Tear of meniscus of knee 2013    LEFT    Wears glasses     readers       Past Surgical History:   Procedure Laterality Date    AUGMENTATION MAMMAPLASTY Bilateral 01/2017    EXPLANTED & REPLACED 04/20/2022    BREAST EXCISIONAL BIOPSY Bilateral 1980    CHOLECYSTECTOMY      with hysterectomy    COLONOSCOPY      FOOT SURGERY  2013    Bilateral bunionectomy . 3rd toe right foot tendon release    HYSTERECTOMY  2004    total    KNEE SURGERY Left 2010    Torn meniscus Left    OOPHORECTOMY Bilateral 2004    REDUCTION MAMMAPLASTY Bilateral 2013    WITH MASTOPEXY    SHOULDER SURGERY Right 2009    Torn rotator cuff repair right    TONSILLECTOMY      TOTAL KNEE ARTHROPLASTY Left 5/24/2023    Procedure: TOTAL KNEE ARTHROPLASTY - LEFT;  Surgeon: Jonas Lipscomb MD;  Location:   "[facility];  Service: Orthopedics;  Laterality: Left;    WISDOM TOOTH EXTRACTION         Family History   Problem Relation Age of Onset    Arthritis Mother     Hyperlipidemia Mother     Osteoporosis Mother     Hyperlipidemia Father     Diabetes Father     Brain cancer Father     Hyperlipidemia Sister     Hyperlipidemia Sister     Hyperlipidemia Brother     Coronary artery disease Brother     Liver disease Brother     Rheumatologic disease Maternal Aunt     Ovarian cancer Neg Hx     Breast cancer Neg Hx        Social History     Socioeconomic History    Marital status:    Tobacco Use    Smoking status: Never    Smokeless tobacco: Never   Vaping Use    Vaping Use: Never used   Substance and Sexual Activity    Alcohol use: Yes     Alcohol/week: 2.0 standard drinks of alcohol     Types: 2 Glasses of wine per week     Comment: 2-3 drinks per week    Drug use: No    Sexual activity: Not Currently     Partners: Male     Birth control/protection: None       Allergies   Allergen Reactions    Statins Myalgia     Other reaction(s): Myalgias (Muscle Pain)         Review of Systems:   The appropriate review of systems is included in the HPI.    Vital Signs  Vitals:    10/19/23 1048   BP: 122/60   BP Location: Left arm   Patient Position: Sitting   Cuff Size: Adult   Pulse: 91   Temp: 98.4 °F (36.9 °C)   TempSrc: Infrared   SpO2: 99%   Weight: 78.9 kg (174 lb)   Height: 165.1 cm (65\")     Body mass index is 28.96 kg/m².   BMI is >= 25 and <30. (Overweight) The following options were offered after discussion;: weight loss educational material (shared in after visit summary), exercise counseling/recommendations, and nutrition counseling/recommendations          Current Outpatient Medications:     acetaminophen (TYLENOL) 500 MG tablet, Take 2 tablets by mouth Every 8 (Eight) Hours., Disp: 42 tablet, Rfl: 0    Ascorbic Acid (VITAMIN C PO), 1000mg once daily, Disp: , Rfl:     Coenzyme Q10 400 MG capsule, Take 1 capsule by mouth " Every Night., Disp: 90 capsule, Rfl: 3    CREAM BASE EX, Apply  topically to the appropriate area as directed. Apply topically Testosterone 0.3mg/progesterone 25mg/0.5ml TD daily in syringes OK TO FILL 4 MONTH SUPPLY ., Disp: , Rfl:     estradiol (MINIVELLE, VIVELLE-DOT) 0.05 MG/24HR patch, 1 patch 2 (Two) Times a Week., Disp: , Rfl:     fluocinonide (LIDEX) 0.05 % external solution, Apply  topically to the appropriate area as directed 2 (Two) Times a Day., Disp: 60 mL, Rfl: 5    loratadine (CLARITIN) 10 MG tablet, Take 1 tablet by mouth Daily., Disp: , Rfl:     meloxicam (MOBIC) 15 MG tablet, 1 PO Daily with food.  Stop if you have upset stomach/stomach irritation., Disp: 30 tablet, Rfl: 1    metFORMIN (GLUCOPHAGE) 500 MG tablet, Take 2 tablets by mouth Daily With Dinner. 2 tablets every evening, Disp: , Rfl:     metroNIDAZOLE (METROCREAM) 0.75 % cream, APPLY TOPICALLY TO FACE TWICE DAILY FOR ROSACEA, Disp: , Rfl:     NON FORMULARY, Indoplex w/DIM  1 capsule daily, Disp: , Rfl:     NON FORMULARY, Reservatrol  Once daily, Disp: , Rfl:     Nutritional Supplements (DHEA PO), Pt takes 5mg daily, Disp: , Rfl:     omeprazole (priLOSEC) 40 MG capsule, Take 1 capsule by mouth Daily., Disp: 90 capsule, Rfl: 1    pravastatin (PRAVACHOL) 10 MG tablet, Take 1 tablet by mouth Every Night., Disp: 30 tablet, Rfl: 5    Semaglutide, 2 MG/DOSE, (OZEMPIC) 8 MG/3ML solution pen-injector, Inject 2 mg under the skin into the appropriate area as directed 1 (One) Time Per Week., Disp: 3 mL, Rfl: 5    Triamcinolone Acetonide (NASACORT ALLERGY 24HR NA), Daily As Needed. 2 sprays in each nostril per day, Disp: , Rfl:     Vibegron 75 MG tablet, Take 1 tablet by mouth Daily., Disp: 30 tablet, Rfl:     Physical Exam:    Physical Exam  Vitals and nursing note reviewed.   Constitutional:       Appearance: Normal appearance. She is well-developed.      Comments: Overweight.   HENT:      Head: Normocephalic.   Eyes:      Conjunctiva/sclera:  Conjunctivae normal.      Pupils: Pupils are equal, round, and reactive to light.   Neck:      Thyroid: No thyromegaly.   Cardiovascular:      Rate and Rhythm: Normal rate and regular rhythm.      Heart sounds: Normal heart sounds.   Pulmonary:      Effort: Pulmonary effort is normal.      Breath sounds: Normal breath sounds. No wheezing.   Musculoskeletal:         General: Normal range of motion.      Cervical back: Normal range of motion and neck supple.      Comments: Knee: Arthritic changes.   Lymphadenopathy:      Cervical: No cervical adenopathy.   Skin:     General: Skin is warm and dry.   Neurological:      Mental Status: She is alert and oriented to person, place, and time.   Psychiatric:         Thought Content: Thought content normal.        ACE III MINI        Results Review:    I reviewed the patient's new clinical results.    CMP:  Lab Results   Component Value Date    BUN 16 10/13/2023    BUN 18 10/13/2023    CREATININE 0.80 10/13/2023    CREATININE 0.75 10/13/2023    EGFRIFNONA 81 01/02/2020    BCR 20.0 10/13/2023    BCR 24.0 10/13/2023     10/13/2023     10/13/2023    K 4.2 10/13/2023    K 4.2 10/13/2023    CO2 23.2 10/13/2023    CO2 24.2 10/13/2023    CALCIUM 9.8 10/13/2023    CALCIUM 9.8 10/13/2023    ALBUMIN 4.9 10/13/2023    ALBUMIN 5.3 (H) 10/13/2023    BILITOT 0.7 10/13/2023    BILITOT 0.7 10/13/2023    ALKPHOS 70 10/13/2023    ALKPHOS 76 10/13/2023    AST 22 10/13/2023    AST 25 10/13/2023    ALT 23 10/13/2023    ALT 24 10/13/2023     HbA1c:  Lab Results   Component Value Date    HGBA1C 5.10 10/13/2023    HGBA1C 5.10 05/15/2023     Microalbumin:  Lab Results   Component Value Date    MICROALBUR <1.2 10/13/2023     Lipid Panel  Lab Results   Component Value Date    CHOL 179 10/13/2023    TRIG 82 10/13/2023    TRIG 74 10/13/2023    HDL 51 10/13/2023     (H) 10/13/2023    AST 22 10/13/2023    AST 25 10/13/2023    ALT 23 10/13/2023    ALT 24 10/13/2023       Medication Review:  Medications reviewed and noted  Patient Instructions   Problem List Items Addressed This Visit          Cardiac and Vasculature    Mixed hyperlipidemia - Primary (Chronic)    Overview     LDL 2022 was 183 with predominance of small LDL at 1513.  She has low HDL.  She has a strong family history of hyperlipidemia.  She has a brother who  of MI at age 65.  Several aunts and uncles on both parents side had heart disease.    Taking rosuvastatin 5 mg 3 times a week.  Her latest LDL  2023 was improved to 117.          Relevant Medications    pravastatin (PRAVACHOL) 10 MG tablet    Other Relevant Orders    CBC & Differential (Completed)    Comprehensive Metabolic Panel (Completed)    Lipid Panel (Completed)    Microalbumin / Creatinine Urine Ratio - Urine, Clean Catch (Completed)    Urinalysis With Microscopic - Urine, Clean Catch (Completed)    TSH (Completed)       Endocrine and Metabolic    RESOLVED: Class 1 obesity due to excess calories with serious comorbidity and body mass index (BMI) of 32.0 to 32.9 in adult (Chronic)    Metabolic syndrome (Chronic)    Overview     Metformin daily.  Continue Ozempic weekly.           Current Assessment & Plan     Continue taking metformin daily. Continue Ozempic weekly. Continue regular exercise and low fat, low carbohydrate diet. Continue weight loss.         Relevant Orders    Hemoglobin A1c (Completed)       Gastrointestinal Abdominal     Gastroesophageal reflux disease without esophagitis (Chronic)    Overview     Taking omeprazole daily.         Current Assessment & Plan     She will continue omeprazole daily. Continue taking the meloxicam with food. Continue to avoid eating close to bedtime and avoid large meals.         Relevant Medications    omeprazole (priLOSEC) 40 MG capsule       Skin    Psoriasis of scalp (Chronic)    Relevant Medications    fluocinonide (LIDEX) 0.05 % external solution    meloxicam (MOBIC) 15 MG tablet    metroNIDAZOLE (METROCREAM) 0.75  % cream       Symptoms and Signs    Leg cramps    Current Assessment & Plan     She will continue taking CoQ10 nightly. May add to 500 mg of magnesium twice per day as needed. Stay well hydrated.            Other    Acute cough (Chronic)    Current Assessment & Plan     She will continue Mucinex as needed.         Overweight with body mass index (BMI) of 29 to 29.9 in adult (Chronic)    Current Assessment & Plan     She is doing very well losing weight by taking Ozempic and doing regular exercise and following low-fat, low-carbohydrate diet. I sent in refills on the Ozempic.          Other Visit Diagnoses       Vitamin D deficiency        Relevant Orders    Vitamin D,25-Hydroxy (Completed)    Abnormal finding of blood chemistry, unspecified        Relevant Orders    Hemoglobin A1c (Completed)               Diagnosis Plan   1. Mixed hyperlipidemia  CBC & Differential    Comprehensive Metabolic Panel    Lipid Panel    Microalbumin / Creatinine Urine Ratio - Urine, Clean Catch    Urinalysis With Microscopic - Urine, Clean Catch    TSH      2. Acute cough        3. Leg cramps        4. Metabolic syndrome  Hemoglobin A1c      5. Gastroesophageal reflux disease without esophagitis        6. Psoriasis of scalp        7. Class 1 obesity due to excess calories with serious comorbidity and body mass index (BMI) of 32.0 to 32.9 in adult        8. Vitamin D deficiency  Vitamin D,25-Hydroxy      9. Abnormal finding of blood chemistry, unspecified  Hemoglobin A1c      10. Overweight with body mass index (BMI) of 29 to 29.9 in adult                Follow-up: She will come back to see me in 6 months for annual wellness visit.    Plan of care reviewed with patient at the conclusion of today's visit. Education was provided regarding diagnosis, management, and any prescribed or recommended OTC medications.Patient verbalizes understanding of and agreement with management plan.         Transcribed from ambient dictation for Elen YEAGER  MD Guy by Clemencia Lopez.  10/19/23   12:56 EDT    Patient or patient representative verbalized consent to the visit recording.  I have personally performed the services described in this document as transcribed by the above individual, and it is both accurate and complete.

## 2023-10-19 NOTE — PATIENT INSTRUCTIONS
Patient Instructions  Problem List Items Addressed This Visit          Cardiac and Vasculature    Mixed hyperlipidemia - Primary (Chronic)    Overview     LDL 2022 was 183 with predominance of small LDL at 1513.  She has low HDL.  She has a strong family history of hyperlipidemia.  She has a brother who  of MI at age 65.  Several aunts and uncles on both parents side had heart disease.    Taking rosuvastatin 5 mg 3 times a week.  Her latest LDL  2023 was improved to 117.          Relevant Medications    pravastatin (PRAVACHOL) 10 MG tablet    Other Relevant Orders    CBC & Differential (Completed)    Comprehensive Metabolic Panel (Completed)    Lipid Panel (Completed)    Microalbumin / Creatinine Urine Ratio - Urine, Clean Catch (Completed)    Urinalysis With Microscopic - Urine, Clean Catch (Completed)    TSH (Completed)       Endocrine and Metabolic    RESOLVED: Class 1 obesity due to excess calories with serious comorbidity and body mass index (BMI) of 32.0 to 32.9 in adult (Chronic)    Metabolic syndrome (Chronic)    Overview     Metformin daily.  Continue Ozempic weekly.           Current Assessment & Plan     Continue taking metformin daily. Continue Ozempic weekly. Continue regular exercise and low fat, low carbohydrate diet. Continue weight loss.         Relevant Orders    Hemoglobin A1c (Completed)       Gastrointestinal Abdominal     Gastroesophageal reflux disease without esophagitis (Chronic)    Overview     Taking omeprazole daily.         Current Assessment & Plan     She will continue omeprazole daily. Continue taking the meloxicam with food. Continue to avoid eating close to bedtime and avoid large meals.         Relevant Medications    omeprazole (priLOSEC) 40 MG capsule       Skin    Psoriasis of scalp (Chronic)    Relevant Medications    fluocinonide (LIDEX) 0.05 % external solution    meloxicam (MOBIC) 15 MG tablet    metroNIDAZOLE (METROCREAM) 0.75 % cream       Symptoms and Signs     Leg cramps    Current Assessment & Plan     She will continue taking CoQ10 nightly. May add to 500 mg of magnesium twice per day as needed. Stay well hydrated.            Other    Acute cough (Chronic)    Current Assessment & Plan     She will continue Mucinex as needed.         Overweight with body mass index (BMI) of 29 to 29.9 in adult (Chronic)    Current Assessment & Plan     She is doing very well losing weight by taking Ozempic and doing regular exercise and following low-fat, low-carbohydrate diet. I sent in refills on the Ozempic.          Other Visit Diagnoses       Vitamin D deficiency        Relevant Orders    Vitamin D,25-Hydroxy (Completed)    Abnormal finding of blood chemistry, unspecified        Relevant Orders    Hemoglobin A1c (Completed)            Exercising to Lose Weight  Getting regular exercise is important for everyone. It is especially important if you are overweight. Being overweight increases your risk of heart disease, stroke, diabetes, high blood pressure, and several types of cancer. Exercising, and reducing the calories you consume, can help you lose weight and improve fitness and health.  Exercise can be moderate or vigorous intensity. To lose weight, most people need to do a certain amount of moderate or vigorous-intensity exercise each week.  How can exercise affect me?  You lose weight when you exercise enough to burn more calories than you eat. Exercise also reduces body fat and builds muscle. The more muscle you have, the more calories you burn. Exercise also:  Improves mood.  Reduces stress and tension.  Improves your overall fitness, flexibility, and endurance.  Increases bone strength.  Moderate-intensity exercise  A person riding a bicycle wearing a safety helmet.      Moderate-intensity exercise is any activity that gets you moving enough to burn at least three times more energy (calories) than if you were sitting.  Examples of moderate exercise include:  Walking a mile  in 15 minutes.  Doing light yard work.  Biking at an easy pace.  Most people should get at least 150 minutes of moderate-intensity exercise a week to maintain their body weight.  Vigorous-intensity exercise  Vigorous-intensity exercise is any activity that gets you moving enough to burn at least six times more calories than if you were sitting. When you exercise at this intensity, you should be working hard enough that you are not able to carry on a conversation.  Examples of vigorous exercise include:  Running.  Playing a team sport, such as football, basketball, and soccer.  Jumping rope.  Most people should get at least 75 minutes a week of vigorous exercise to maintain their body weight.  What actions can I take to lose weight?  The amount of exercise you need to lose weight depends on:  Your age.  The type of exercise.  Any health conditions you have.  Your overall physical ability.  Talk to your health care provider about how much exercise you need and what types of activities are safe for you.  Nutrition  A plate with healthy, colorful foods.      Make changes to your diet as told by your health care provider or diet and nutrition specialist (dietitian). This may include:  Eating fewer calories.  Eating more protein.  Eating less unhealthy fats.  Eating a diet that includes fresh fruits and vegetables, whole grains, low-fat dairy products, and lean protein.  Avoiding foods with added fat, salt, and sugar.  Drink plenty of water while you exercise to prevent dehydration or heat stroke.  Activity  Choose an activity that you enjoy and set realistic goals. Your health care provider can help you make an exercise plan that works for you.  Exercise at a moderate or vigorous intensity most days of the week.  The intensity of exercise may vary from person to person. You can tell how intense a workout is for you by paying attention to your breathing and heartbeat. Most people will notice their breathing and heartbeat  get faster with more intense exercise.  Do resistance training twice each week, such as:  Push-ups.  Sit-ups.  Lifting weights.  Using resistance bands.  Getting short amounts of exercise can be just as helpful as long, structured periods of exercise. If you have trouble finding time to exercise, try doing these things as part of your daily routine:  Get up, stretch, and walk around every 30 minutes throughout the day.  Go for a walk during your lunch break.  Park your car farther away from your destination.  If you take public transportation, get off one stop early and walk the rest of the way.  Make phone calls while standing up and walking around.  Take the stairs instead of elevators or escalators.  Wear comfortable clothes and shoes with good support.  Do not exercise so much that you hurt yourself, feel dizzy, or get very short of breath.  Where to find more information  [place] Department of Health and Human Services: www.hhs.gov  Centers for Disease Control and Prevention: www.cdc.gov  Contact a health care provider:  Before starting a new exercise program.  If you have questions or concerns about your weight.  If you have a medical problem that keeps you from exercising.  Get help right away if:  You have any of the following while exercising:  Injury.  Dizziness.  Difficulty breathing or shortness of breath that does not go away when you stop exercising.  Chest pain.  Rapid heartbeat.  These symptoms may represent a serious problem that is an emergency. Do not wait to see if the symptoms will go away. Get medical help right away. Call your local emergency services (911 in the [place] Do not drive yourself to the hospital.  Summary  Getting regular exercise is especially important if you are overweight.  Being overweight increases your risk of heart disease, stroke, diabetes, high blood pressure, and several types of cancer.  Losing weight happens when you burn more calories than you eat.  Reducing the amount  of calories you eat, and getting regular moderate or vigorous exercise each week, helps you lose weight.  This information is not intended to replace advice given to you by your health care provider. Make sure you discuss any questions you have with your health care provider. Heart-Healthy Eating Plan  Many factors influence your heart (coronary) health, including eating and exercise habits. Coronary risk increases with abnormal blood fat (lipid) levels. Heart-healthy meal planning includes limiting unhealthy fats, increasing healthy fats, and making other diet and lifestyle changes.  What is my plan?  Your health care provider may recommend that you:  Limit your fat intake to _________% or less of your total calories each day.  Limit your saturated fat intake to _________% or less of your total calories each day.  Limit the amount of cholesterol in your diet to less than _________ mg per day.  What are tips for following this plan?  Cooking  Cook foods using methods other than frying. Baking, boiling, grilling, and broiling are all good options. Other ways to reduce fat include:  Removing the skin from poultry.  Removing all visible fats from meats.  Steaming vegetables in water or broth.  Meal planning  A plate with examples of foods in a healthy diet.      At meals, imagine dividing your plate into fourths:  Fill one-half of your plate with vegetables and green salads.  Fill one-fourth of your plate with whole grains.  Fill one-fourth of your plate with lean protein foods.  Eat 4-5 servings of vegetables per day. One serving equals 1 cup raw or cooked vegetable, or 2 cups raw leafy greens.  Eat 4-5 servings of fruit per day. One serving equals 1 medium whole fruit, ¼ cup dried fruit, ½ cup fresh, frozen, or canned fruit, or ½ cup 100% fruit juice.  Eat more foods that contain soluble fiber. Examples include apples, broccoli, carrots, beans, peas, and barley. Aim to get 25-30 g of fiber per day.  Increase your  consumption of legumes, nuts, and seeds to 4-5 servings per week. One serving of dried beans or legumes equals ½ cup cooked, 1 serving of nuts is ¼ cup, and 1 serving of seeds equals 1 tablespoon.  Fats  Choose healthy fats more often. Choose monounsaturated and polyunsaturated fats, such as olive and canola oils, flaxseeds, walnuts, almonds, and seeds.  Eat more omega-3 fats. Choose salmon, mackerel, sardines, tuna, flaxseed oil, and ground flaxseeds. Aim to eat fish at least 2 times each week.  Check food labels carefully to identify foods with trans fats or high amounts of saturated fat.  Limit saturated fats. These are found in animal products, such as meats, butter, and cream. Plant sources of saturated fats include palm oil, palm kernel oil, and coconut oil.  Avoid foods with partially hydrogenated oils in them. These contain trans fats. Examples are stick margarine, some tub margarines, cookies, crackers, and other baked goods.  Avoid fried foods.  General information  Eat more home-cooked food and less restaurant, buffet, and fast food.  Limit or avoid alcohol.  Limit foods that are high in starch and sugar.  Lose weight if you are overweight. Losing just 5-10% of your body weight can help your overall health and prevent diseases such as diabetes and heart disease.  Monitor your salt (sodium) intake, especially if you have high blood pressure. Talk with your health care provider about your sodium intake.  Try to incorporate more vegetarian meals weekly.  What foods can I eat?  Fruits  All fresh, canned (in natural juice), or frozen fruits.  Vegetables  Fresh or frozen vegetables (raw, steamed, roasted, or grilled). Green salads.  Grains  Most grains. Choose whole wheat and whole grains most of the time. Rice and pasta, including brown rice and pastas made with whole wheat.  Meats and other proteins  Lean, well-trimmed beef, veal, pork, and lamb. Chicken and turkey without skin. All fish and shellfish. Wild  duck, rabbit, pheasant, and venison. Egg whites or low-cholesterol egg substitutes. Dried beans, peas, lentils, and tofu. Seeds and most nuts.  Dairy  Low-fat or nonfat cheeses, including ricotta and mozzarella. Skim or 1% milk (liquid, powdered, or evaporated). Buttermilk made with low-fat milk. Nonfat or low-fat yogurt.  Fats and oils  Non-hydrogenated (trans-free) margarines. Vegetable oils, including soybean, sesame, sunflower, olive, peanut, safflower, corn, canola, and cottonseed. Salad dressings or mayonnaise made with a vegetable oil.  Beverages  Water (mineral or sparkling). Coffee and tea. Diet carbonated beverages.  Sweets and desserts  Sherbet, gelatin, and fruit ice. Small amounts of dark chocolate.  Limit all sweets and desserts.  Seasonings and condiments  All seasonings and condiments.  The items listed above may not be a complete list of foods and beverages you can eat. Contact a dietitian for more options.  What foods are not recommended?  Fruits  Canned fruit in heavy syrup. Fruit in cream or butter sauce. Fried fruit. Limit coconut.  Vegetables  Vegetables cooked in cheese, cream, or butter sauce. Fried vegetables.  Grains  Breads made with saturated or trans fats, oils, or whole milk. Croissants. Sweet rolls. Donuts. High-fat crackers, such as cheese crackers.  Meats and other proteins  Fatty meats, such as hot dogs, ribs, sausage, buckner, rib-eye roast or steak. High-fat deli meats, such as salami and bologna. Caviar. Domestic duck and goose. Organ meats, such as liver.  Dairy  Cream, sour cream, cream cheese, and creamed cottage cheese. Whole-milk cheeses. Whole or 2% milk (liquid, evaporated, or condensed). Whole buttermilk. Cream sauce or high-fat cheese sauce. Whole-milk yogurt.  Fats and oils  Meat fat, or shortening. Cocoa butter, hydrogenated oils, palm oil, coconut oil, palm kernel oil. Solid fats and shortenings, including buckner fat, salt pork, lard, and butter. Nondairy cream  substitutes. Salad dressings with cheese or sour cream.  Beverages  Regular sodas and any drinks with added sugar.  Sweets and desserts  Frosting. Pudding. Cookies. Cakes. Pies. Milk chocolate or white chocolate. Buttered syrups. Full-fat ice cream or ice cream drinks.  The items listed above may not be a complete list of foods and beverages to avoid. Contact a dietitian for more information.  Summary  Heart-healthy meal planning includes limiting unhealthy fats, increasing healthy fats, and making other diet and lifestyle changes.  Lose weight if you are overweight. Losing just 5-10% of your body weight can help your overall health and prevent diseases such as diabetes and heart disease.  Focus on eating a balance of foods, including fruits and vegetables, low-fat or nonfat dairy, lean protein, nuts and legumes, whole grains, and heart-healthy oils and fats.  This information is not intended to replace advice given to you by your health care provider. Make sure you discuss any questions you have with your health care provider.  Document Revised: 04/28/2022 Document Reviewed: 04/28/2022  [employer] Patient Education © 2022 [employer]    BMI for Adults  What is BMI?  Body mass index (BMI) is a number that is calculated from a person's weight and height. BMI can help estimate how much of a person's weight is composed of fat. BMI does not measure body fat directly. Rather, it is an alternative to procedures that directly measure body fat, which can be difficult and expensive.  BMI can help identify people who may be at higher risk for certain medical problems.  What are BMI measurements used for?  BMI is used as a screening tool to identify possible weight problems. It helps determine whether a person is obese, overweight, a healthy weight, or underweight.  BMI is useful for:  Identifying a weight problem that may be related to a medical condition or may increase the risk for medical problems.  Promoting changes, such  "as changes in diet and exercise, to help reach a healthy weight. BMI screening can be repeated to see if these changes are working.  How is BMI calculated?  BMI involves measuring your weight in relation to your height. Both height and weight are measured, and the BMI is calculated from those numbers. This can be done either in English ([place] or metric measurements. Note that charts and online BMI calculators are available to help you find your BMI quickly and easily without having to do these calculations yourself.  To calculate your BMI in English ([place] measurements:    Measure your weight in pounds (lb).  Multiply the number of pounds by 703.  For example, for a person who weighs 180 lb, multiply that number by 703, which equals 126,540.  Measure your height in inches. Then multiply that number by itself to get a measurement called \"inches squared.\"  For example, for a person who is 70 inches tall, the \"inches squared\" measurement is 70 inches x 70 inches, which equals 4,900 inches squared.  Divide the total from step 2 (number of lb x 703) by the total from step 3 (inches squared): 126,540 ÷ 4,900 = 25.8. This is your BMI.  To calculate your BMI in metric measurements:  Measure your weight in kilograms (kg).  Measure your height in meters (m). Then multiply that number by itself to get a measurement called \"meters squared.\"  For example, for a person who is 1.75 m tall, the \"meters squared\" measurement is 1.75 m x 1.75 m, which is equal to 3.1 meters squared.  Divide the number of kilograms (your weight) by the meters squared number. In this example: 70 ÷ 3.1 = 22.6. This is your BMI.  What do the results mean?  BMI charts are used to identify whether you are underweight, normal weight, overweight, or obese. The following guidelines will be used:  Underweight: BMI less than 18.5.  Normal weight: BMI between 18.5 and 24.9.  Overweight: BMI between 25 and 29.9.  Obese: BMI of 30 or above.  Keep these notes in " mind:  Weight includes both fat and muscle, so someone with a muscular build, such as an athlete, may have a BMI that is higher than 24.9. In cases like these, BMI is not an accurate measure of body fat.  To determine if excess body fat is the cause of a BMI of 25 or higher, further assessments may need to be done by a health care provider.  BMI is usually interpreted in the same way for men and women.  Where to find more information  For more information about BMI, including tools to quickly calculate your BMI, go to these websites:  Centers for Disease Control and Prevention: www.cdc.gov  American Heart Association: www.heart.org  National Heart, Lung, and Blood [place]: www.nhlbi.nih.gov  Summary  Body mass index (BMI) is a number that is calculated from a person's weight and height.  BMI may help estimate how much of a person's weight is composed of fat. BMI can help identify those who may be at higher risk for certain medical problems.  BMI can be measured using English measurements or metric measurements.  BMI charts are used to identify whether you are underweight, normal weight, overweight, or obese.  This information is not intended to replace advice given to you by your health care provider. Make sure you discuss any questions you have with your health care provider.  Document Revised: 05/31/2023 Document Reviewed: 07/17/2020  [employer] Patient Education © 2023 [employer]

## 2023-10-19 NOTE — ASSESSMENT & PLAN NOTE
She will continue taking CoQ10 nightly. May add to 500 mg of magnesium twice per day as needed. Stay well hydrated.

## 2023-10-23 ENCOUNTER — TELEPHONE (OUTPATIENT)
Dept: INTERNAL MEDICINE | Facility: CLINIC | Age: 68
End: 2023-10-23

## 2023-10-23 NOTE — TELEPHONE ENCOUNTER
Caller: Stephani Bhardwaj    Relationship: Self    Best call back number: [phone]     What medication are you requesting: SOMETHING TO HELP WITH SYMPTOMS    What are your current symptoms: DIARRHEA, ABDOMINAL CRAMPING, FATIGUE    How long have you been experiencing symptoms: 10/21    Have you had these symptoms before:    [] Yes  [x] No  If a prescription is needed, what is your preferred pharmacy and phone number: [id number] - [place], [place] - [address] [facility] [phone] [facility] [phone] FX

## 2023-10-23 NOTE — TELEPHONE ENCOUNTER
Patient understood and verbalized understanding. Scheduled her to see Clemencia Laws tomorrow at 1:15pm

## 2023-10-24 ENCOUNTER — HOSPITAL ENCOUNTER (OUTPATIENT)
Dept: BONE DENSITY | Facility: HOSPITAL | Age: 68
Discharge: HOME | End: 2023-10-24
Admitting: INTERNAL MEDICINE
Payer: MEDICARE

## 2023-10-24 ENCOUNTER — HOSPITAL ENCOUNTER (OUTPATIENT)
Dept: MAMMOGRAPHY | Facility: HOSPITAL | Age: 68
Discharge: HOME | End: 2023-10-24
Admitting: INTERNAL MEDICINE
Payer: MEDICARE

## 2023-10-24 ENCOUNTER — OFFICE VISIT (OUTPATIENT)
Dept: INTERNAL MEDICINE | Facility: CLINIC | Age: 68
End: 2023-10-24
Payer: MEDICARE

## 2023-10-24 VITALS
TEMPERATURE: 98.6 F | WEIGHT: 172 LBS | BODY MASS INDEX: 28.66 KG/M2 | HEIGHT: 65 IN | DIASTOLIC BLOOD PRESSURE: 64 MMHG | SYSTOLIC BLOOD PRESSURE: 102 MMHG | HEART RATE: 112 BPM

## 2023-10-24 DIAGNOSIS — R19.7 DIARRHEA, UNSPECIFIED TYPE: Primary | ICD-10-CM

## 2023-10-24 DIAGNOSIS — N95.9 MENOPAUSAL AND POSTMENOPAUSAL DISORDER: ICD-10-CM

## 2023-10-24 DIAGNOSIS — Z12.31 BREAST CANCER SCREENING BY MAMMOGRAM: ICD-10-CM

## 2023-10-24 DIAGNOSIS — R10.30 LOWER ABDOMINAL PAIN: ICD-10-CM

## 2023-10-24 PROCEDURE — 1160F RVW MEDS BY RX/DR IN RCRD: CPT

## 2023-10-24 PROCEDURE — 77080 DXA BONE DENSITY AXIAL: CPT

## 2023-10-24 PROCEDURE — 77067 SCR MAMMO BI INCL CAD: CPT

## 2023-10-24 PROCEDURE — 77063 BREAST TOMOSYNTHESIS BI: CPT

## 2023-10-24 PROCEDURE — 99213 OFFICE O/P EST LOW 20 MIN: CPT

## 2023-10-24 PROCEDURE — 1159F MED LIST DOCD IN RCRD: CPT

## 2023-10-24 NOTE — PROGRESS NOTES
Acute Office Visit      Name: Stephani Tovar    : 1955     MRN: [id number]   Care Team: Patient Care Team:  Elen Tovar MD as PCP - General (Internal Medicine)    Chief Complaint  GI Problem (Abdominal pain, diarrhea, and nausea x 2 days )    Subjective     History of Present Illness:  The patient is here for an office visit.    The patient had a doctor's visit on 10/19/2023.  On 10/20/2023 at night and 10/21/2023 in the morning she experienced diarrhea and abdominal pain. Today, 10/24/2023, she is describing diarrhea. The diarrhea has resolved because she has been taking Imodium which she takes when she has an episode. She denies any sick contacts.  Yesterday, 10/23/2023, she was experiencing nausea. She does not have an appetite.    She did not take metformin last night.      Review of Systems:  Diarrhea, abdominal pain  Past Medical History:   Diagnosis Date    Arthritis     Arthritis of neck     Breast injury 2017    RT BREAST HIT S/P FALL    Cervical disc disorder 2009    Cervical    Class 1 obesity due to excess calories with serious comorbidity and body mass index (BMI) of 32.0 to 32.9 in adult     Frozen shoulder     Right shoulder    GERD (gastroesophageal reflux disease)     Heartburn     Hip arthrosis     Right hip    Hyperlipidemia     Knee swelling     Left knee    PONV (postoperative nausea and vomiting)     zofran and phen help pt    Psoriasis     scalp    Rotator cuff syndrome 2009    Right shoulder    Scoliosis 8 years ago    Tear of meniscus of knee     LEFT    Wears glasses     readers       Past Surgical History:   Procedure Laterality Date    AUGMENTATION MAMMAPLASTY Bilateral 2017    EXPLANTED & REPLACED 2022    BREAST EXCISIONAL BIOPSY Bilateral     CHOLECYSTECTOMY      with hysterectomy    COLONOSCOPY      FOOT SURGERY  2013    Bilateral bunionectomy . 3rd toe right foot tendon release    HYSTERECTOMY  2004    total    KNEE  SURGERY Left 2010    Torn meniscus Left    OOPHORECTOMY Bilateral 2004    REDUCTION MAMMAPLASTY Bilateral 2013    WITH MASTOPEXY    SHOULDER SURGERY Right 2009    Torn rotator cuff repair right    TONSILLECTOMY      TOTAL KNEE ARTHROPLASTY Left 5/24/2023    Procedure: TOTAL KNEE ARTHROPLASTY - LEFT;  Surgeon: Jonas Lipscomb MD;  Location: [facility];  Service: Orthopedics;  Laterality: Left;    WISDOM TOOTH EXTRACTION         Social History     Socioeconomic History    Marital status:    Tobacco Use    Smoking status: Never    Smokeless tobacco: Never   Vaping Use    Vaping Use: Never used   Substance and Sexual Activity    Alcohol use: Yes     Alcohol/week: 2.0 standard drinks of alcohol     Types: 2 Glasses of wine per week     Comment: 2-3 drinks per week    Drug use: No    Sexual activity: Not Currently     Partners: Male     Birth control/protection: None         Current Outpatient Medications:     acetaminophen (TYLENOL) 500 MG tablet, Take 2 tablets by mouth Every 8 (Eight) Hours., Disp: 42 tablet, Rfl: 0    Ascorbic Acid (VITAMIN C PO), 1000mg once daily, Disp: , Rfl:     Coenzyme Q10 400 MG capsule, Take 1 capsule by mouth Every Night., Disp: 90 capsule, Rfl: 3    CREAM BASE EX, Apply  topically to the appropriate area as directed. Apply topically Testosterone 0.3mg/progesterone 25mg/0.5ml TD daily in syringes OK TO FILL 4 MONTH SUPPLY ., Disp: , Rfl:     estradiol (MINIVELLE, VIVELLE-DOT) 0.05 MG/24HR patch, 1 patch 2 (Two) Times a Week., Disp: , Rfl:     fluocinonide (LIDEX) 0.05 % external solution, Apply  topically to the appropriate area as directed 2 (Two) Times a Day., Disp: 60 mL, Rfl: 5    loratadine (CLARITIN) 10 MG tablet, Take 1 tablet by mouth Daily., Disp: , Rfl:     meloxicam (MOBIC) 15 MG tablet, 1 PO Daily with food.  Stop if you have upset stomach/stomach irritation., Disp: 30 tablet, Rfl: 1    metFORMIN (GLUCOPHAGE) 500 MG tablet, Take 2 tablets by mouth Daily With Dinner. 2  "tablets every evening, Disp: , Rfl:     metroNIDAZOLE (METROCREAM) 0.75 % cream, APPLY TOPICALLY TO FACE TWICE DAILY FOR ROSACEA, Disp: , Rfl:     NON FORMULARY, Indoplex w/DIM  1 capsule daily, Disp: , Rfl:     NON FORMULARY, Reservatrol  Once daily, Disp: , Rfl:     Nutritional Supplements (DHEA PO), Pt takes 5mg daily, Disp: , Rfl:     omeprazole (priLOSEC) 40 MG capsule, Take 1 capsule by mouth Daily., Disp: 90 capsule, Rfl: 1    pravastatin (PRAVACHOL) 10 MG tablet, Take 1 tablet by mouth Every Night., Disp: 30 tablet, Rfl: 5    Semaglutide, 2 MG/DOSE, (OZEMPIC) 8 MG/3ML solution pen-injector, Inject 2 mg under the skin into the appropriate area as directed 1 (One) Time Per Week., Disp: 3 mL, Rfl: 5    Triamcinolone Acetonide (NASACORT ALLERGY 24HR NA), Daily As Needed. 2 sprays in each nostril per day, Disp: , Rfl:     Vibegron 75 MG tablet, Take 1 tablet by mouth Daily., Disp: 30 tablet, Rfl:     Procedures    PHQ-9 Total Score:      Objective     Vital Signs  /64 (BP Location: Left arm, Patient Position: Sitting, Cuff Size: Adult)   Pulse 112   Temp 98.6 °F (37 °C) (Temporal)   Ht 165.1 cm (65\")   Wt 78 kg (172 lb)   BMI 28.62 kg/m²   Estimated body mass index is 28.62 kg/m² as calculated from the following:    Height as of this encounter: 165.1 cm (65\").    Weight as of this encounter: 78 kg (172 lb).            Physical Exam       Assessment and Plan     Assessment/Plan:  Diagnoses and all orders for this visit:    1. Diarrhea, unspecified type (Primary)  -Drink clear liquids such as water, Sprite or Jell-O.  -Introduce bland foods into her diet.  -Discontinue Imodium.  -Take a probiotic.  -Offered rectal swab for culture, pt refused.    2. Lower abdominal pain  -Same as above.     Return/Call if not resolved in 2-3 days.    There are no Patient Instructions on file for this visit.  Plan of care reviewed with patient at the conclusion of today's visit. Education was provided regarding diagnosis, " management and any prescribed or recommended OTC medications.  Patient verbalizes understanding of and agreement with management plan.    Follow Up  Return for Next Scheduled Follow up.    RODO Estrada     Transcribed from ambient dictation for RODO Estrada by Arin Wood.  10/24/23   16:21 EDT    Patient or patient representative verbalized consent to the visit recording.  I have personally performed the services described in this document as transcribed by the above individual, and it is both accurate and complete.

## 2023-11-03 ENCOUNTER — TELEPHONE (OUTPATIENT)
Dept: INTERNAL MEDICINE | Facility: CLINIC | Age: 68
End: 2023-11-03

## 2023-11-03 NOTE — TELEPHONE ENCOUNTER
Patient understood and verbalized understanding. She states she had a covid test this morning and she was negative for covid

## 2023-11-03 NOTE — TELEPHONE ENCOUNTER
Caller: Stephani Bhardwaj    Relationship: Self    Best call back number: [phone]       What was the call regarding: OZEMPIC IS OUT OF STOCK STATE WIDE. CAN PATIENT GET APPROVED FOR WEGOVY? PATIENT IS ALSO CONGESTED, TAKING MUCINEX, NOT SEEMING TO GET BETTER. PLEASE ADVISE.    Is it okay if the provider responds through MyChart: YES    [id number] - [place], [place] - [address] AT [facility] [address]  - [phone]  - [phone] FX

## 2023-11-07 RX ORDER — SEMAGLUTIDE 2.68 MG/ML
INJECTION, SOLUTION SUBCUTANEOUS
Qty: 3 ML | Refills: 5 | Status: SHIPPED | OUTPATIENT
Start: 2023-11-07

## 2023-11-09 RX ORDER — OMEPRAZOLE 40 MG/1
40 CAPSULE, DELAYED RELEASE ORAL DAILY
Qty: 90 CAPSULE | Refills: 1 | Status: SHIPPED | OUTPATIENT
Start: 2023-11-09

## 2024-01-15 RX ORDER — PRAVASTATIN SODIUM 10 MG
10 TABLET ORAL NIGHTLY
Qty: 30 TABLET | Refills: 5 | Status: SHIPPED | OUTPATIENT
Start: 2024-01-15

## 2024-01-15 NOTE — TELEPHONE ENCOUNTER
Caller: Stephani Bhardwaj    Relationship: Self    Best call back number: [phone]    Requested Prescriptions:   Requested Prescriptions     Pending Prescriptions Disp Refills    pravastatin (PRAVACHOL) 10 MG tablet 30 tablet 5     Sig: Take 1 tablet by mouth Every Night.        Pharmacy where request should be sent: [facility] #[id number] - [place], [place] - [address] AT [facility] [address] - [phone]  - [phone] FX     Last office visit with prescribing clinician: 10/19/2023   Last telemedicine visit with prescribing clinician: Visit date not found   Next office visit with prescribing clinician: 4/10/2024     Additional details provided by patient:  PATIENT HAS A WEEK LEFT AND HAS NO REFILLS    Does the patient have less than a 3 day supply:  [x] Yes  [] No    Silvia Stephen Rep   01/15/24 08:42 EST

## 2024-02-12 ENCOUNTER — TELEPHONE (OUTPATIENT)
Dept: INTERNAL MEDICINE | Facility: CLINIC | Age: 69
End: 2024-02-12
Payer: MEDICARE

## 2024-02-12 RX ORDER — SEMAGLUTIDE 2.68 MG/ML
INJECTION, SOLUTION SUBCUTANEOUS
Qty: 3 ML | Refills: 5 | Status: SHIPPED | OUTPATIENT
Start: 2024-02-12

## 2024-04-02 ENCOUNTER — TELEPHONE (OUTPATIENT)
Dept: INTERNAL MEDICINE | Facility: CLINIC | Age: 69
End: 2024-04-02
Payer: MEDICARE

## 2024-04-02 NOTE — TELEPHONE ENCOUNTER
PATIENT HAS CALLED REQUESTING IF LAB ORDERS IN IN PREPARATION FOR HER UPCOMING APPOINTMENT ON 04/10/24. PATIENT IS REQUESTING A CALL BACK ONCE LABS ORDERS ARE IN AND TO ALSO LET HER KNOW IF THESE ARE TO BE FASTING LABS.    CALL BACK NUMBER [phone]

## 2024-04-04 ENCOUNTER — LAB (OUTPATIENT)
Dept: LAB | Facility: HOSPITAL | Age: 69
End: 2024-04-04
Payer: MEDICARE

## 2024-04-04 DIAGNOSIS — E78.2 MIXED HYPERLIPIDEMIA: ICD-10-CM

## 2024-04-04 DIAGNOSIS — E88.810 METABOLIC SYNDROME: Chronic | ICD-10-CM

## 2024-04-04 DIAGNOSIS — R79.9 ABNORMAL FINDING OF BLOOD CHEMISTRY, UNSPECIFIED: ICD-10-CM

## 2024-04-04 DIAGNOSIS — E55.9 VITAMIN D DEFICIENCY: ICD-10-CM

## 2024-04-04 LAB
25(OH)D3 SERPL-MCNC: 72 NG/ML (ref 30–100)
ALBUMIN SERPL-MCNC: 4.8 G/DL (ref 3.5–5.2)
ALBUMIN/GLOB SERPL: 2.2 G/DL
ALP SERPL-CCNC: 70 U/L (ref 39–117)
ALT SERPL W P-5'-P-CCNC: 19 U/L (ref 1–33)
ANION GAP SERPL CALCULATED.3IONS-SCNC: 11.8 MMOL/L (ref 5–15)
AST SERPL-CCNC: 20 U/L (ref 1–32)
BASOPHILS # BLD AUTO: 0.04 10*3/MM3 (ref 0–0.2)
BASOPHILS NFR BLD AUTO: 0.6 % (ref 0–1.5)
BILIRUB SERPL-MCNC: 0.4 MG/DL (ref 0–1.2)
BUN SERPL-MCNC: 21 MG/DL (ref 8–23)
BUN/CREAT SERPL: 25.9 (ref 7–25)
CALCIUM SPEC-SCNC: 9.3 MG/DL (ref 8.6–10.5)
CHLORIDE SERPL-SCNC: 102 MMOL/L (ref 98–107)
CHOLEST SERPL-MCNC: 190 MG/DL (ref 0–200)
CO2 SERPL-SCNC: 27.2 MMOL/L (ref 22–29)
CREAT SERPL-MCNC: 0.81 MG/DL (ref 0.57–1)
DEPRECATED RDW RBC AUTO: 40.2 FL (ref 37–54)
EGFRCR SERPLBLD CKD-EPI 2021: 80.2 ML/MIN/1.73
EOSINOPHIL # BLD AUTO: 0.12 10*3/MM3 (ref 0–0.4)
EOSINOPHIL NFR BLD AUTO: 1.9 % (ref 0.3–6.2)
ERYTHROCYTE [DISTWIDTH] IN BLOOD BY AUTOMATED COUNT: 12.7 % (ref 12.3–15.4)
GLOBULIN UR ELPH-MCNC: 2.2 GM/DL
GLUCOSE SERPL-MCNC: 82 MG/DL (ref 65–99)
HBA1C MFR BLD: 5.4 % (ref 4.8–5.6)
HCT VFR BLD AUTO: 46 % (ref 34–46.6)
HCYS SERPL-MCNC: 12.2 UMOL/L (ref 0–15)
HDLC SERPL-MCNC: 65 MG/DL (ref 40–60)
HGB BLD-MCNC: 15.2 G/DL (ref 12–15.9)
IMM GRANULOCYTES # BLD AUTO: 0.01 10*3/MM3 (ref 0–0.05)
IMM GRANULOCYTES NFR BLD AUTO: 0.2 % (ref 0–0.5)
LDLC SERPL CALC-MCNC: 111 MG/DL (ref 0–100)
LDLC/HDLC SERPL: 1.68 {RATIO}
LYMPHOCYTES # BLD AUTO: 1.82 10*3/MM3 (ref 0.7–3.1)
LYMPHOCYTES NFR BLD AUTO: 28.7 % (ref 19.6–45.3)
MCH RBC QN AUTO: 28.7 PG (ref 26.6–33)
MCHC RBC AUTO-ENTMCNC: 33 G/DL (ref 31.5–35.7)
MCV RBC AUTO: 87 FL (ref 79–97)
MONOCYTES # BLD AUTO: 0.79 10*3/MM3 (ref 0.1–0.9)
MONOCYTES NFR BLD AUTO: 12.5 % (ref 5–12)
NEUTROPHILS NFR BLD AUTO: 3.56 10*3/MM3 (ref 1.7–7)
NEUTROPHILS NFR BLD AUTO: 56.1 % (ref 42.7–76)
NRBC BLD AUTO-RTO: 0 /100 WBC (ref 0–0.2)
PLATELET # BLD AUTO: 260 10*3/MM3 (ref 140–450)
PMV BLD AUTO: 9.7 FL (ref 6–12)
POTASSIUM SERPL-SCNC: 4.5 MMOL/L (ref 3.5–5.2)
PROT SERPL-MCNC: 7 G/DL (ref 6–8.5)
RBC # BLD AUTO: 5.29 10*6/MM3 (ref 3.77–5.28)
SODIUM SERPL-SCNC: 141 MMOL/L (ref 136–145)
TRIGL SERPL-MCNC: 79 MG/DL (ref 0–150)
TSH SERPL DL<=0.05 MIU/L-ACNC: 2.9 UIU/ML (ref 0.27–4.2)
VLDLC SERPL-MCNC: 14 MG/DL (ref 5–40)
WBC NRBC COR # BLD AUTO: 6.34 10*3/MM3 (ref 3.4–10.8)

## 2024-04-04 PROCEDURE — 80053 COMPREHEN METABOLIC PANEL: CPT

## 2024-04-04 PROCEDURE — 82306 VITAMIN D 25 HYDROXY: CPT

## 2024-04-04 PROCEDURE — 80061 LIPID PANEL: CPT

## 2024-04-04 PROCEDURE — 83090 ASSAY OF HOMOCYSTEINE: CPT

## 2024-04-04 PROCEDURE — 83036 HEMOGLOBIN GLYCOSYLATED A1C: CPT

## 2024-04-04 PROCEDURE — 85025 COMPLETE CBC W/AUTO DIFF WBC: CPT

## 2024-04-04 PROCEDURE — 84443 ASSAY THYROID STIM HORMONE: CPT

## 2024-04-10 ENCOUNTER — OFFICE VISIT (OUTPATIENT)
Dept: INTERNAL MEDICINE | Facility: CLINIC | Age: 69
End: 2024-04-10
Payer: MEDICARE

## 2024-04-10 ENCOUNTER — LAB (OUTPATIENT)
Dept: LAB | Facility: HOSPITAL | Age: 69
End: 2024-04-10
Payer: MEDICARE

## 2024-04-10 VITALS
HEART RATE: 83 BPM | BODY MASS INDEX: 31.21 KG/M2 | TEMPERATURE: 98.4 F | DIASTOLIC BLOOD PRESSURE: 52 MMHG | HEIGHT: 64 IN | WEIGHT: 182.8 LBS | SYSTOLIC BLOOD PRESSURE: 126 MMHG | OXYGEN SATURATION: 98 %

## 2024-04-10 DIAGNOSIS — Z00.00 MEDICARE ANNUAL WELLNESS VISIT, SUBSEQUENT: Primary | ICD-10-CM

## 2024-04-10 DIAGNOSIS — E55.9 VITAMIN D DEFICIENCY: ICD-10-CM

## 2024-04-10 DIAGNOSIS — E78.2 MIXED HYPERLIPIDEMIA: ICD-10-CM

## 2024-04-10 DIAGNOSIS — E88.810 METABOLIC SYNDROME: Chronic | ICD-10-CM

## 2024-04-10 DIAGNOSIS — R35.0 URINARY FREQUENCY: Chronic | ICD-10-CM

## 2024-04-10 DIAGNOSIS — E78.2 MIXED HYPERLIPIDEMIA: Chronic | ICD-10-CM

## 2024-04-10 DIAGNOSIS — E66.09 CLASS 1 OBESITY DUE TO EXCESS CALORIES WITH SERIOUS COMORBIDITY AND BODY MASS INDEX (BMI) OF 31.0 TO 31.9 IN ADULT: ICD-10-CM

## 2024-04-10 PROBLEM — E66.811 CLASS 1 OBESITY DUE TO EXCESS CALORIES WITH SERIOUS COMORBIDITY AND BODY MASS INDEX (BMI) OF 31.0 TO 31.9 IN ADULT: Status: ACTIVE | Noted: 2022-12-13

## 2024-04-10 PROBLEM — N95.9 MENOPAUSAL AND POSTMENOPAUSAL DISORDER: Chronic | Status: ACTIVE | Noted: 2024-04-10

## 2024-04-10 PROCEDURE — 1170F FXNL STATUS ASSESSED: CPT | Performed by: INTERNAL MEDICINE

## 2024-04-10 PROCEDURE — G0439 PPPS, SUBSEQ VISIT: HCPCS | Performed by: INTERNAL MEDICINE

## 2024-04-10 PROCEDURE — 81001 URINALYSIS AUTO W/SCOPE: CPT

## 2024-04-10 PROCEDURE — 1160F RVW MEDS BY RX/DR IN RCRD: CPT | Performed by: INTERNAL MEDICINE

## 2024-04-10 PROCEDURE — 1159F MED LIST DOCD IN RCRD: CPT | Performed by: INTERNAL MEDICINE

## 2024-04-10 RX ORDER — PRAVASTATIN SODIUM 10 MG
10 TABLET ORAL NIGHTLY
Qty: 90 TABLET | Refills: 1 | Status: SHIPPED | OUTPATIENT
Start: 2024-04-10

## 2024-04-10 RX ORDER — METFORMIN HYDROCHLORIDE 500 MG/1
500 TABLET, EXTENDED RELEASE ORAL 2 TIMES DAILY WITH MEALS
Qty: 180 TABLET | Refills: 3 | Status: SHIPPED | OUTPATIENT
Start: 2024-04-10

## 2024-04-10 RX ORDER — SOLIFENACIN SUCCINATE 10 MG/1
10 TABLET, FILM COATED ORAL DAILY
COMMUNITY

## 2024-04-10 RX ORDER — PHENTERMINE HYDROCHLORIDE 37.5 MG/1
37.5 TABLET ORAL
Qty: 30 TABLET | Refills: 2 | Status: SHIPPED | OUTPATIENT
Start: 2024-04-10

## 2024-04-10 NOTE — PATIENT INSTRUCTIONS
Patient Instructions  Problem List Items Addressed This Visit          Cardiac and Vasculature    Mixed hyperlipidemia (Chronic)    Overview     LDL 2022 was 183 with predominance of small LDL at 1513.  She has low HDL.  She has a strong family history of hyperlipidemia.  She has a brother who  of MI at age 65.  Several aunts and uncles on both parents side had heart disease.    Taking pravastatin nightly and Co Q 10         Relevant Medications    pravastatin (PRAVACHOL) 10 MG tablet    Other Relevant Orders    CBC & Differential (Completed)    Comprehensive Metabolic Panel (Completed)    Homocysteine (Completed)    Lipid Panel (Completed)    Urinalysis With Microscopic - Urine, Clean Catch    TSH (Completed)       Endocrine and Metabolic    Metabolic syndrome (Chronic)    Overview     Metformin            Relevant Orders    Hemoglobin A1c (Completed)    Class 1 obesity due to excess calories with serious comorbidity and body mass index (BMI) of 31.0 to 31.9 in adult    Relevant Medications    phentermine (ADIPEX-P) 37.5 MG tablet       Genitourinary and Reproductive     Urinary frequency (Chronic)     Other Visit Diagnoses       Medicare annual wellness visit, subsequent    -  Primary    Vitamin D deficiency        Relevant Orders    Vitamin D,25-Hydroxy (Completed)           Exercising to Stay Healthy  To become healthy and stay healthy, it is recommended that you do moderate-intensity and vigorous-intensity exercise. You can tell that you are exercising at a moderate intensity if your heart starts beating faster and you start breathing faster but can still hold a conversation. You can tell that you are exercising at a vigorous intensity if you are breathing much harder and faster and cannot hold a conversation while exercising.  How can exercise benefit me?  Exercising regularly is important. It has many health benefits, such as:  Improving overall fitness, flexibility, and endurance.  Increasing  bone density.  Helping with weight control.  Decreasing body fat.  Increasing muscle strength and endurance.  Reducing stress and tension, anxiety, depression, or anger.  Improving overall health.  What guidelines should I follow while exercising?  Before you start a new exercise program, talk with your health care provider.  Do not exercise so much that you hurt yourself, feel dizzy, or get very short of breath.  Wear comfortable clothes and wear shoes with good support.  Drink plenty of water while you exercise to prevent dehydration or heat stroke.  Work out until your breathing and your heartbeat get faster (moderate intensity).  How often should I exercise?  Choose an activity that you enjoy, and set realistic goals. Your health care provider can help you make an activity plan that is individually designed and works best for you.  Exercise regularly as told by your health care provider. This may include:  Doing strength training two times a week, such as:  Lifting weights.  Using resistance bands.  Push-ups.  Sit-ups.  Yoga.  Doing a certain intensity of exercise for a given amount of time. Choose from these options:  A total of 150 minutes of moderate-intensity exercise every week.  A total of 75 minutes of vigorous-intensity exercise every week.  A mix of moderate-intensity and vigorous-intensity exercise every week.  Children, pregnant women, people who have not exercised regularly, people who are overweight, and older adults may need to talk with a health care provider about what activities are safe to perform. If you have a medical condition, be sure to talk with your health care provider before you start a new exercise program.  What are some exercise ideas?  Moderate-intensity exercise ideas include:  Walking 1 mile (1.6 km) in about 15 minutes.  Biking.  Hiking.  Golfing.  Dancing.  Water aerobics.  Vigorous-intensity exercise ideas include:  Walking 4.5 miles (7.2 km) or more in about 1 hour.  Jogging  or running 5 miles (8 km) in about 1 hour.  Biking 10 miles (16.1 km) or more in about 1 hour.  Lap swimming.  Roller-skating or in-line skating.  Cross-country skiing.  Vigorous competitive sports, such as football, basketball, and soccer.  Jumping rope.  Aerobic dancing.  What are some everyday activities that can help me get exercise?  Yard work, such as:  Pushing a .  Raking and bagging leaves.  Washing your car.  Pushing a stroller.  Shoveling snow.  Gardening.  Washing windows or floors.  How can I be more active in my day-to-day activities?  Use stairs instead of an elevator.  Take a walk during your lunch break.  If you drive, park your car farther away from your work or school.  If you take public transportation, get off one stop early and walk the rest of the way.  Stand up or walk around during all of your indoor phone calls.  Get up, stretch, and walk around every 30 minutes throughout the day.  Enjoy exercise with a friend. Support to continue exercising will help you keep a regular routine of activity.  Where to find more information  You can find more information about exercising to stay healthy from:  [place] Department of Health and Human Services: www.hhs.gov  Centers for Disease Control and Prevention (CDC): www.cdc.gov  Summary  Exercising regularly is important. It will improve your overall fitness, flexibility, and endurance.  Regular exercise will also improve your overall health. It can help you control your weight, reduce stress, and improve your bone density.  Do not exercise so much that you hurt yourself, feel dizzy, or get very short of breath.  Before you start a new exercise program, talk with your health care provider.  This information is not intended to replace advice given to you by your health care provider. Make sure you discuss any questions you have with your health care provider.  Document Revised: 04/15/2022 Document Reviewed: 04/15/2022  [employer] Patient Education ©  "2023 [employer] BMI for Adults  Body mass index (BMI) is a number found using a person's weight and height. BMI can help tell how much of a person's weight is made up of fat. BMI does not measure body fat directly. It is used instead of tests that directly measure body fat, which can be difficult and expensive.  What are BMI measurements used for?  BMI is useful to:  Find out if your weight puts you at higher risk for medical problems.  Help recommend changes, such as in diet and exercise. This can help you reach a healthy weight. BMI screening can be done again to see if these changes are working.  How is BMI calculated?  Your height and weight are measured. The BMI is found from those numbers. This can be done with [place] or metric measurements. Note that charts and online BMI calculators are available to help you find your BMI quickly and easily without doing these calculations.  To calculate your BMI in [place] measurements:  Measure your weight in pounds (lb).  Multiply the number of pounds by 703.  So, for an adult who weighs 150 lb, multiply that number by 703: 150 x 703, which equals 105,450.  Measure your height in inches. Then multiply that number by itself to get a measurement called \"inches squared.\"  So, for an adult who is 70 inches tall, the \"inches squared\" measurement is 70 inches x 70 inches, which equals 4,900 inches squared.  Divide the total from step 2 (number of lb x 703) by the total from step 3 (inches squared): 105,450 ÷ 4,900 = 21.5. This is your BMI.  To calculate your BMI in metric measurements:    Measure your weight in kilograms (kg).  For this example, the weight is 70 kg.  Measure your height in meters (m). Then multiply that number by itself to get a measurement called \"meters squared.\"  So, for an adult who is 1.75 m tall, the \"meters squared\" measurement is 1.75 m x 1.75 m, which equals 3.1 meters squared.  Divide the number of kilograms (your weight) by the meters squared number. " In this example: 70 ÷ 3.1 = 22.6. This is your BMI.  What do the results mean?  BMI charts are used to see if you are underweight, normal weight, overweight, or obese. The following guidelines will be used:  Underweight: BMI less than 18.5.  Normal weight: BMI between 18.5 and 24.9.  Overweight: BMI between 25 and 29.9.  Obese: BMI of 30 or above.  BMI is a tool and cannot diagnose a condition. Talk with your health care provider about what your BMI means for you. Keep these notes in mind:  Weight includes fat and muscle. Someone with a muscular build, such as an athlete, may have a BMI that is higher than 24.9. In cases like these, BMI is not a correct measure of body fat.  If you have a BMI of 25 or higher, your provider may need to do more testing to find out if excess body fat is the cause.  BMI is measured the same way for males and females. Females usually have more body fat than males of the same height and weight.  Where to find more information  For more information about BMI, including tools to quickly find your BMI, go to:  Centers for Disease Control and Prevention: cdc.gov  American Heart Association: heart.org  National Heart, Lung, and Blood [place]: nhlbi.nih.gov  This information is not intended to replace advice given to you by your health care provider. Make sure you discuss any questions you have with your health care provider.  Document Revised: 09/07/2023 Document Reviewed: 08/31/2023  [employer] Patient Education © 2023 [employer]  Heart-Healthy Eating Plan  Many factors influence your heart (coronary) health, including eating and exercise habits. Coronary risk increases with abnormal blood fat (lipid) levels. Heart-healthy meal planning includes limiting unhealthy fats, increasing healthy fats, and making other diet and lifestyle changes.  What is my plan?  Your health care provider may recommend that you:  Limit your fat intake to _________% or less of your total calories each day.  Limit  your saturated fat intake to _________% or less of your total calories each day.  Limit the amount of cholesterol in your diet to less than _________ mg per day.  What are tips for following this plan?  Cooking  Cook foods using methods other than frying. Baking, boiling, grilling, and broiling are all good options. Other ways to reduce fat include:  Removing the skin from poultry.  Removing all visible fats from meats.  Steaming vegetables in water or broth.  Meal planning  A plate with examples of foods in a healthy diet.      At meals, imagine dividing your plate into fourths:  Fill one-half of your plate with vegetables and green salads.  Fill one-fourth of your plate with whole grains.  Fill one-fourth of your plate with lean protein foods.  Eat 4-5 servings of vegetables per day. One serving equals 1 cup raw or cooked vegetable, or 2 cups raw leafy greens.  Eat 4-5 servings of fruit per day. One serving equals 1 medium whole fruit, ¼ cup dried fruit, ½ cup fresh, frozen, or canned fruit, or ½ cup 100% fruit juice.  Eat more foods that contain soluble fiber. Examples include apples, broccoli, carrots, beans, peas, and barley. Aim to get 25-30 g of fiber per day.  Increase your consumption of legumes, nuts, and seeds to 4-5 servings per week. One serving of dried beans or legumes equals ½ cup cooked, 1 serving of nuts is ¼ cup, and 1 serving of seeds equals 1 tablespoon.  Fats  Choose healthy fats more often. Choose monounsaturated and polyunsaturated fats, such as olive and canola oils, flaxseeds, walnuts, almonds, and seeds.  Eat more omega-3 fats. Choose salmon, mackerel, sardines, tuna, flaxseed oil, and ground flaxseeds. Aim to eat fish at least 2 times each week.  Check food labels carefully to identify foods with trans fats or high amounts of saturated fat.  Limit saturated fats. These are found in animal products, such as meats, butter, and cream. Plant sources of saturated fats include palm oil, palm  kernel oil, and coconut oil.  Avoid foods with partially hydrogenated oils in them. These contain trans fats. Examples are stick margarine, some tub margarines, cookies, crackers, and other baked goods.  Avoid fried foods.  General information  Eat more home-cooked food and less restaurant, buffet, and fast food.  Limit or avoid alcohol.  Limit foods that are high in starch and sugar.  Lose weight if you are overweight. Losing just 5-10% of your body weight can help your overall health and prevent diseases such as diabetes and heart disease.  Monitor your salt (sodium) intake, especially if you have high blood pressure. Talk with your health care provider about your sodium intake.  Try to incorporate more vegetarian meals weekly.  What foods can I eat?  Fruits  All fresh, canned (in natural juice), or frozen fruits.  Vegetables  Fresh or frozen vegetables (raw, steamed, roasted, or grilled). Green salads.  Grains  Most grains. Choose whole wheat and whole grains most of the time. Rice and pasta, including brown rice and pastas made with whole wheat.  Meats and other proteins  Lean, well-trimmed beef, veal, pork, and lamb. Chicken and turkey without skin. All fish and shellfish. Wild duck, rabbit, pheasant, and venison. Egg whites or low-cholesterol egg substitutes. Dried beans, peas, lentils, and tofu. Seeds and most nuts.  Dairy  Low-fat or nonfat cheeses, including ricotta and mozzarella. Skim or 1% milk (liquid, powdered, or evaporated). Buttermilk made with low-fat milk. Nonfat or low-fat yogurt.  Fats and oils  Non-hydrogenated (trans-free) margarines. Vegetable oils, including soybean, sesame, sunflower, olive, peanut, safflower, corn, canola, and cottonseed. Salad dressings or mayonnaise made with a vegetable oil.  Beverages  Water (mineral or sparkling). Coffee and tea. Diet carbonated beverages.  Sweets and desserts  Sherbet, gelatin, and fruit ice. Small amounts of dark chocolate.  Limit all sweets and  desserts.  Seasonings and condiments  All seasonings and condiments.  The items listed above may not be a complete list of foods and beverages you can eat. Contact a dietitian for more options.  What foods are not recommended?  Fruits  Canned fruit in heavy syrup. Fruit in cream or butter sauce. Fried fruit. Limit coconut.  Vegetables  Vegetables cooked in cheese, cream, or butter sauce. Fried vegetables.  Grains  Breads made with saturated or trans fats, oils, or whole milk. Croissants. Sweet rolls. Donuts. High-fat crackers, such as cheese crackers.  Meats and other proteins  Fatty meats, such as hot dogs, ribs, sausage, buckner, rib-eye roast or steak. High-fat deli meats, such as salami and bologna. Caviar. Domestic duck and goose. Organ meats, such as liver.  Dairy  Cream, sour cream, cream cheese, and creamed cottage cheese. Whole-milk cheeses. Whole or 2% milk (liquid, evaporated, or condensed). Whole buttermilk. Cream sauce or high-fat cheese sauce. Whole-milk yogurt.  Fats and oils  Meat fat, or shortening. Cocoa butter, hydrogenated oils, palm oil, coconut oil, palm kernel oil. Solid fats and shortenings, including buckner fat, salt pork, lard, and butter. Nondairy cream substitutes. Salad dressings with cheese or sour cream.  Beverages  Regular sodas and any drinks with added sugar.  Sweets and desserts  Frosting. Pudding. Cookies. Cakes. Pies. Milk chocolate or white chocolate. Buttered syrups. Full-fat ice cream or ice cream drinks.  The items listed above may not be a complete list of foods and beverages to avoid. Contact a dietitian for more information.  Summary  Heart-healthy meal planning includes limiting unhealthy fats, increasing healthy fats, and making other diet and lifestyle changes.  Lose weight if you are overweight. Losing just 5-10% of your body weight can help your overall health and prevent diseases such as diabetes and heart disease.  Focus on eating a balance of foods, including fruits  and vegetables, low-fat or nonfat dairy, lean protein, nuts and legumes, whole grains, and heart-healthy oils and fats.  This information is not intended to replace advice given to you by your health care provider. Make sure you discuss any questions you have with your health care provider.  Document Revised: 04/28/2022 Document Reviewed: 04/28/2022  [employer] Patient Education © 2022 [employer]

## 2024-04-10 NOTE — PROGRESS NOTES
The ABCs of the Annual Wellness Visit  Initial Medicare Wellness Visit    Chief Complaint   Patient presents with    Medicare Wellness-subsequent    Hyperlipidemia       Subjective   History of Present Illness:  Stephani Tovar is a 68 y.o. female who presents for an Initial Medicare Wellness Visit.    CHRONIC CONDITIONS:    The patient is here for an annual physical.    The patient has not been engaging in regular physical activity, attributing this to her busy schedule caring for her  following an accident. She recently returned from [place] and anticipates resuming her exercise regimen next week. She was previously on Ozempic, which was effective, but discontinued due to insurance constraints. Although it did suppress her appetite significantly, she experienced severe nausea. She expresses interest in exploring mild appetite suppressants. Her goal is to reduce her belly fat to at least 145 pounds. She has been attempting to reduce her sugar intake, but during periods of stress, she consumes chocolate. She attempts to eat two meals a day, primarily consisting of a protein shake in the morning and a salad with protein for lunch and dinner. She is currently taking metformin 500 mg in the evening, but is considering increasing the dosage.    The patient reports occasional leg jerking, which she believes is due to aging. She finds her legs moving during massages. She denies any sleep disturbances.    The patient reports frequent urination, which she attributes to Vesicare. She was previously on Gemtesa, which was effective, but discontinued due to cost. She denies any dysuria.    The patient finds pravastatin more effective than rosuvastatin, which she takes every evening. She is scheduled for a colonoscopy in 06/2024. She denies experiencing heartburn or indigestion. She avoids high-acid foods and eats earlier, which alleviates her symptoms. She has omeprazole available for use as needed. She is not  using metronidazole cream for rosacea. She inquires about the necessity of fish oil.    The patient takes a daily dose of 5000 IU of vitamin D.    Supplemental Information  She had 2 squamous cell skin cancers removed in [place], one on her right thigh. She had implants put in. She had a breast reduction. She is not taking any meloxicam. She had a knee replacement. It took a full year for recovery.   Her grandmother had restless legs syndrome. Her sister has neuropathy in her feet.    HEALTH RISK ASSESSMENT    Recent Hospitalizations:  She was not admitted to the hospital during the last year.       Current Medical Providers:  Patient Care Team:  Elen Tovar MD as PCP - General (Internal Medicine)    Smoking Status:  Social History     Tobacco Use   Smoking Status Never   Smokeless Tobacco Never       Alcohol Consumption:  Social History     Substance and Sexual Activity   Alcohol Use Yes    Alcohol/week: 2.0 standard drinks of alcohol    Types: 2 Glasses of wine per week    Comment: 2-3 drinks per week       Depression Screen:       4/10/2024    11:50 AM   PHQ-2/PHQ-9 Depression Screening   Little Interest or Pleasure in Doing Things 0-->not at all   Feeling Down, Depressed or Hopeless 0-->not at all   PHQ-9: Brief Depression Severity Measure Score 0       Fall Risk Screen:  LANNY Fall Risk Assessment was completed, and patient is at LOW risk for falls.Assessment completed on:4/10/2024    Health Habits and Functional and Cognitive Screenin/10/2024    11:48 AM   Functional & Cognitive Status   Do you have difficulty preparing food and eating? No   Do you have difficulty bathing yourself, getting dressed or grooming yourself? No   Do you have difficulty using the toilet? No   Do you have difficulty moving around from place to place? No   Do you have trouble with steps or getting out of a bed or a chair? No   Current Diet Well Balanced Diet   Dental Exam Up to date   Eye Exam Up to date   Exercise  (times per week) 4 times per week   Current Exercises Include Walking   Do you need help using the phone?  No   Are you deaf or do you have serious difficulty hearing?  No   Do you need help to go to places out of walking distance? No   Do you need help shopping? No   Do you need help preparing meals?  No   Do you need help with housework?  No   Do you need help with laundry? No   Do you need help taking your medications? No   Do you need help managing money? No   Do you ever drive or ride in a car without wearing a seat belt? No   Have you felt unusual stress, anger or loneliness in the last month? No   Who do you live with? Spouse   If you need help, do you have trouble finding someone available to you? No   Have you been bothered in the last four weeks by sexual problems? No   Do you have difficulty concentrating, remembering or making decisions? No         Age-appropriate Screening Schedule:  Refer to the list below for future screening recommendations based on patient's age, sex and/or medical conditions. Orders for these recommended tests are listed in the plan section. The patient has been provided with a written plan.    Health Maintenance   Topic Date Due    HEPATITIS C SCREENING  Never done    RSV Vaccine - Adults (1 - 1-dose 60+ series) Never done    COLORECTAL CANCER SCREENING  04/06/2024    INFLUENZA VACCINE  08/01/2024    LIPID PANEL  04/04/2025    ANNUAL WELLNESS VISIT  04/10/2025    BMI FOLLOWUP  04/10/2025    MAMMOGRAM  10/24/2025    DXA SCAN  10/24/2025    TDAP/TD VACCINES (3 - Td or Tdap) 10/07/2030    Pneumococcal Vaccine 65+  Completed    ZOSTER VACCINE  Completed    COVID-19 Vaccine  Discontinued        The following portions of the patient's history were reviewed and updated as appropriate: allergies, current medications, past family history, past medical history, past social history, past surgical history, and problem list.    Does the patient have evidence of cognitive impairment?  No    Aspirin is not on active medication list.  Aspirin use is not indicated based on review of current medical condition/s. Risk of harm outweighs potential benefits.  .    Outpatient Medications Prior to Visit   Medication Sig Dispense Refill    acetaminophen (TYLENOL) 500 MG tablet Take 2 tablets by mouth Every 8 (Eight) Hours. 42 tablet 0    Ascorbic Acid (VITAMIN C PO) 1000mg once daily      CALCIUM PO Take 2 capsules by mouth Daily.      Coenzyme Q10 400 MG capsule Take 1 capsule by mouth Every Night. 90 capsule 3    CREAM BASE EX Apply  topically to the appropriate area as directed. Apply topically Testosterone 0.3mg/progesterone 25mg/0.5ml TD daily in syringes OK TO FILL 4 MONTH SUPPLY .      estradiol (MINIVELLE, VIVELLE-DOT) 0.05 MG/24HR patch 1 patch 2 (Two) Times a Week.      fluocinonide (LIDEX) 0.05 % external solution Apply  topically to the appropriate area as directed 2 (Two) Times a Day. 60 mL 5    loratadine (CLARITIN) 10 MG tablet Take 1 tablet by mouth Daily.      NON FORMULARY Indoplex w/DIM  1 capsule daily      NON FORMULARY Reservatrol  Once daily      Nutritional Supplements (DHEA PO) Pt takes 5mg daily      omeprazole (priLOSEC) 40 MG capsule TAKE 1 CAPSULE BY MOUTH DAILY (Patient taking differently: Take 1 capsule by mouth Daily As Needed.) 90 capsule 1    solifenacin (VESIcare) 10 MG tablet Take 1 tablet by mouth Daily.      Triamcinolone Acetonide (NASACORT ALLERGY 24HR NA) Daily As Needed. 2 sprays in each nostril per day      metFORMIN (GLUCOPHAGE) 500 MG tablet Take 2 tablets by mouth Daily With Dinner. 2 tablets every evening (Patient taking differently: Take 2 tablets by mouth Every Evening.)      Omega-3 Fatty Acids (OMEGA 3 PO) Take  by mouth Daily. Liquid      pravastatin (PRAVACHOL) 10 MG tablet Take 1 tablet by mouth Every Night. 30 tablet 5    meloxicam (MOBIC) 15 MG tablet 1 PO Daily with food.  Stop if you have upset stomach/stomach irritation. (Patient not taking:  "Reported on 4/10/2024) 30 tablet 1    metroNIDAZOLE (METROCREAM) 0.75 % cream APPLY TOPICALLY TO FACE TWICE DAILY FOR ROSACEA (Patient not taking: Reported on 4/10/2024)      Ozempic, 2 MG/DOSE, 8 MG/3ML solution pen-injector INJECT 2 MG UNDER THE SKIN ONE DAY A WEEK (Patient not taking: Reported on 4/10/2024) 3 mL 5    Vibegron 75 MG tablet Take 1 tablet by mouth Daily. (Patient not taking: Reported on 4/10/2024) 30 tablet      No facility-administered medications prior to visit.       Patient Active Problem List   Diagnosis    Mixed hyperlipidemia    Statin intolerance    Chronic pain of left knee    Class 1 obesity due to excess calories with serious comorbidity and body mass index (BMI) of 31.0 to 31.9 in adult    Psoriasis of scalp    Family history of fatty liver    Perennial allergic rhinitis with seasonal variation    Herpes labialis without complication    Gastroesophageal reflux disease without esophagitis    Epistaxis, recurrent    Urinary frequency    Dry skin    Metabolic syndrome    Primary osteoarthritis of left knee    Status post total left knee replacement    Leg cramps    Menopausal and postmenopausal disorder       Advanced Care Planning:  Advance Directive is on file.  ACP discussion was held with the patient during this visit. Patient has an advance directive in EMR which is still valid.     Review of Systems    Compared to one year ago, the patient feels her physical health is the same.  Compared to one year ago, the patient feels her mental health is the same.    Reviewed chart for potential of high risk medication in the elderly: yes  Reviewed chart for potential of harmful drug interactions in the elderly:yes    Objective         Vitals:    04/10/24 1140   BP: 126/52   BP Location: Left arm   Patient Position: Sitting   Cuff Size: Adult   Pulse: 83   Temp: 98.4 °F (36.9 °C)   TempSrc: Infrared   SpO2: 98%   Weight: 82.9 kg (182 lb 12.8 oz)   Height: 162.5 cm (63.98\")     BMI is >= 30 and " <35. (Class 1 Obesity). The following options were offered after discussion;: weight loss educational material (shared in after visit summary), exercise counseling/recommendations, nutrition counseling/recommendations, and Information on healthy weight added to patient's after visit summary.    Body mass index is 31.4 kg/m².  Discussed the patient's BMI with her. The BMI is above average; BMI management plan is completed.    Physical Exam  Vitals and nursing note reviewed.   Constitutional:       Appearance: She is well-developed. She is obese.   HENT:      Head: Normocephalic.   Eyes:      Conjunctiva/sclera: Conjunctivae normal.      Pupils: Pupils are equal, round, and reactive to light.   Neck:      Thyroid: No thyromegaly.   Cardiovascular:      Rate and Rhythm: Normal rate and regular rhythm.      Heart sounds: Normal heart sounds.   Pulmonary:      Effort: Pulmonary effort is normal.      Breath sounds: Normal breath sounds. No wheezing.   Abdominal:      General: Bowel sounds are normal.      Palpations: Abdomen is soft.      Tenderness: There is no abdominal tenderness.   Musculoskeletal:         General: No tenderness. Normal range of motion.      Cervical back: Normal range of motion and neck supple.   Lymphadenopathy:      Cervical: No cervical adenopathy.   Skin:     General: Skin is warm and dry.      Findings: No rash.   Neurological:      Mental Status: She is alert and oriented to person, place, and time.      Cranial Nerves: No cranial nerve deficit.      Sensory: No sensory deficit.      Coordination: Coordination normal.      Gait: Gait normal.   Psychiatric:         Speech: Speech normal.         Behavior: Behavior normal.         Thought Content: Thought content normal.         Judgment: Judgment normal.              Lab Results   Component Value Date    TRIG 79 04/04/2024    HDL 65 (H) 04/04/2024     (H) 04/04/2024    VLDL 14 04/04/2024    HGBA1C 5.40 04/04/2024        Assessment & Plan    Medicare Risks and Personalized Health Plan  CMS Preventative Services Quick Reference  Cardiovascular risk  Obesity/Overweight   Osteoporosis Risk    The above risks/problems have been discussed with the patient.  Pertinent information has been shared with the patient in the After Visit Summary.  Follow up plans and orders are seen below in the Assessment/Plan Section.  Patient Instructions   Problem List Items Addressed This Visit          Cardiac and Vasculature    Mixed hyperlipidemia (Chronic)    Overview     LDL 2022 was 183 with predominance of small LDL at 1513.  She has low HDL.  She has a strong family history of hyperlipidemia.  She has a brother who  of MI at age 65.  Several aunts and uncles on both parents side had heart disease.    Taking pravastatin nightly and Co Q 10         Relevant Medications    pravastatin (PRAVACHOL) 10 MG tablet    Other Relevant Orders    CBC & Differential (Completed)    Comprehensive Metabolic Panel (Completed)    Homocysteine (Completed)    Lipid Panel (Completed)    Urinalysis With Microscopic - Urine, Clean Catch (Completed)    TSH (Completed)       Endocrine and Metabolic    Metabolic syndrome (Chronic)    Overview     Metformin            Relevant Orders    Hemoglobin A1c (Completed)    Class 1 obesity due to excess calories with serious comorbidity and body mass index (BMI) of 31.0 to 31.9 in adult    Relevant Medications    phentermine (ADIPEX-P) 37.5 MG tablet       Genitourinary and Reproductive     Urinary frequency (Chronic)     Other Visit Diagnoses       Medicare annual wellness visit, subsequent    -  Primary    Vitamin D deficiency        Relevant Orders    Vitamin D,25-Hydroxy (Completed)             Follow Up:  Next Medicare Wellness visit to be scheduled in 1 year.    Return in about 6 months (around 10/10/2024) for recheck fasting.    An After Visit Summary and PPPS were given to the patient.     Objective   Vital Signs:  /52 (BP  "Location: Left arm, Patient Position: Sitting, Cuff Size: Adult)   Pulse 83   Temp 98.4 °F (36.9 °C) (Infrared)   Ht 162.5 cm (63.98\")   Wt 82.9 kg (182 lb 12.8 oz)   SpO2 98%   BMI 31.40 kg/m²     The following data was reviewed by: Elen Tovar MD on 04/10/2024:  CMP          5/15/2023    10:56 10/13/2023    10:39 4/4/2024    09:34   CMP   Glucose 84  84     86  82    BUN 22  16     18  21    Creatinine 0.78  0.80     0.75  0.81    EGFR 84.4  81.9     88.5  80.2    Sodium 140  137     139  141    Potassium 4.6  4.2     4.2  4.5    Chloride 104  103     102  102    Calcium 9.9  9.8     9.8  9.3    Total Protein 6.7  7.3     7.4  7.0    Albumin 4.8  4.9     5.3  4.8    Globulin 1.9  2.4     2.1  2.2    Total Bilirubin 0.6  0.7     0.7  0.4    Alkaline Phosphatase 59  70     76  70    AST (SGOT) 23  22     25  20    ALT (SGPT) 23  23     24  19    Albumin/Globulin Ratio 2.5  2.0     2.5  2.2    BUN/Creatinine Ratio 28.2  20.0     24.0  25.9    Anion Gap 11.0  10.8     12.8  11.8      CBC          5/15/2023    10:56 10/13/2023    10:39 4/4/2024    09:34   CBC   WBC 6.20  7.64  6.34    RBC 5.34  5.46  5.29    Hemoglobin 15.1  15.5  15.2    Hematocrit 48.2  46.3  46.0    MCV 90.3  84.8  87.0    MCH 28.3  28.4  28.7    MCHC 31.3  33.5  33.0    RDW 12.0  12.7  12.7    Platelets 252  286  260      CBC w/diff          5/15/2023    10:56 10/13/2023    10:39 4/4/2024    09:34   CBC w/Diff   WBC 6.20  7.64  6.34    RBC 5.34  5.46  5.29    Hemoglobin 15.1  15.5  15.2    Hematocrit 48.2  46.3  46.0    MCV 90.3  84.8  87.0    MCH 28.3  28.4  28.7    MCHC 31.3  33.5  33.0    RDW 12.0  12.7  12.7    Platelets 252  286  260    Neutrophil Rel % 62.9  65.1  56.1    Immature Granulocyte Rel % 0.2  0.3  0.2    Lymphocyte Rel % 26.1  23.6  28.7    Monocyte Rel % 9.5  9.6  12.5    Eosinophil Rel % 0.8  0.9  1.9    Basophil Rel % 0.5  0.5  0.6      Lipid Panel          10/13/2023    10:39 4/4/2024    09:34   Lipid Panel   Total " Cholesterol 179  190    Total Cholesterol 185     Triglycerides 82     74  79    HDL Cholesterol 51  65    VLDL Cholesterol 14  14    LDL Cholesterol  114  111    LDL/HDL Ratio 2.22  1.68      TSH          10/13/2023    10:39 4/4/2024    09:34   TSH   TSH 1.830     1.900  2.900      A1C Last 3 Results          5/15/2023    10:56 10/13/2023    10:39 4/4/2024    09:34   HGBA1C Last 3 Results   Hemoglobin A1C 5.10  5.10  5.40      Results  Laboratory Studies  LDL cholesterol is 111. HDL cholesterol is 65. Blood sugar is 82. A1c is slightly over our goal of around 100. Folate level is normal. Homocysteine is normal. Thyroid levels are normal. Vitamin D level is 72. Blood cell counts are good. White cells are good. RBC staying a little high. Hemoglobin, hematocrit are great. Platelets are very good.        Assessment and Plan   Diagnoses and all orders for this visit:    1. Medicare annual wellness visit, subsequent (Primary)    2. Mixed hyperlipidemia  -     CBC & Differential; Future  -     Comprehensive Metabolic Panel; Future  -     Homocysteine; Future  -     Lipid Panel; Future  -     Urinalysis With Microscopic - Urine, Clean Catch; Future  -     TSH; Future    3. Metabolic syndrome  -     Hemoglobin A1c; Future    4. Class 1 obesity due to excess calories with serious comorbidity and body mass index (BMI) of 31.0 to 31.9 in adult  -     phentermine (ADIPEX-P) 37.5 MG tablet; Take 1 tablet by mouth Every Morning Before Breakfast.  Dispense: 30 tablet; Refill: 2    5. Vitamin D deficiency  -     Vitamin D,25-Hydroxy; Future    6. Urinary frequency    Other orders  -     Mirabegron ER (Myrbetriq) 50 MG tablet sustained-release 24 hour 24 hr tablet; Take 50 mg by mouth Daily.  Dispense: 30 tablet; Refill: 5  -     metFORMIN ER (GLUCOPHAGE-XR) 500 MG 24 hr tablet; Take 1 tablet by mouth 2 (Two) Times a Day With Meals.  Dispense: 180 tablet; Refill: 3  -     pravastatin (PRAVACHOL) 10 MG tablet; Take 1 tablet by mouth  Every Night.  Dispense: 90 tablet; Refill: 1      Assessment & Plan  1. Hyperlipidemia.  The patient is advised to persist with the administration of pravastatin every evening and CoQ10 every evening. She is also encouraged to maintain a low-fat, low-carbohydrate diet and to incorporate more walking and regular exercise into her routine.    2. Metabolic syndrome.  The patient is advised to continue with the current regimen of metformin, regular exercise, and a low-fat diet.    3. Obesity.  The patient's condition will continue to improve with a low-fat, well-balanced diet. She is encouraged to increase her intake of vegetables, protein, fewer carbohydrates, and snack foods, increase her walking and regular exercise, and aim to lose approximately 4 pounds per month. A phentermine tablet will be trialed every morning to suppress her appetite. She is instructed to inform us when she requires a refill or if she experiences any side effects from it.    4. Urinary frequency.  The patient had responded positively to Gemtesa, but it was too costly. Myrbetriq will be sent to the pharmacy to determine if it is less costly by her insurance. If Myrbetriq is not too costly, she may discontinue Vesicare if she desires.    5. Vitamin D.  The patient is advised to decrease her 5000 unit vitamin D3 tablets to every other day, as her level is now over 70, and she is doing very well.    Follow-up  The patient is scheduled for a fasting follow-up in 6 months.          Follow Up   Return in about 6 months (around 10/10/2024) for recheck fasting.  Patient was given instructions and counseling regarding her condition or for health maintenance advice. Please see specific information pulled into the AVS if appropriate.     Patient or patient representative verbalized consent for the use of Ambient Listening during the visit with  Elen Tovar MD for chart documentation. 4/13/2024  12:49 EDT

## 2024-04-11 LAB
BACTERIA UR QL AUTO: ABNORMAL /HPF
BILIRUB UR QL STRIP: NEGATIVE
CLARITY UR: CLEAR
COLOR UR: YELLOW
GLUCOSE UR STRIP-MCNC: NEGATIVE MG/DL
HGB UR QL STRIP.AUTO: NEGATIVE
HYALINE CASTS UR QL AUTO: ABNORMAL /LPF
KETONES UR QL STRIP: NEGATIVE
LEUKOCYTE ESTERASE UR QL STRIP.AUTO: NEGATIVE
NITRITE UR QL STRIP: NEGATIVE
PH UR STRIP.AUTO: 7 [PH] (ref 5–8)
PROT UR QL STRIP: NEGATIVE
RBC # UR STRIP: ABNORMAL /HPF
REF LAB TEST METHOD: ABNORMAL
SP GR UR STRIP: 1.01 (ref 1–1.03)
SQUAMOUS #/AREA URNS HPF: ABNORMAL /HPF
UROBILINOGEN UR QL STRIP: NORMAL
WBC # UR STRIP: ABNORMAL /HPF

## 2024-04-22 ENCOUNTER — TELEPHONE (OUTPATIENT)
Dept: ORTHOPEDIC SURGERY | Facility: CLINIC | Age: 69
End: 2024-04-22
Payer: MEDICARE

## 2024-04-22 RX ORDER — AMOXICILLIN 500 MG/1
2000 TABLET, FILM COATED ORAL ONCE
Qty: 4 TABLET | Refills: 1 | Status: SHIPPED | OUTPATIENT
Start: 2024-04-22 | End: 2024-04-22

## 2024-04-22 NOTE — TELEPHONE ENCOUNTER
Patient is having dental procedure 5/17/24 and asked that we send amoxicillin prescription in. Patient is also having cosmetic sx 5/8/24 and wants to know if she needs any antibiotics for that as well.     Please advise     [id number] - [place], [place] - [address] AT [facility] [address] - [phone] PH

## 2024-04-22 NOTE — TELEPHONE ENCOUNTER
OK FOR HUB TO RELAY    Left message for pt to return call.  Need to verify what type of cosmetic surgery she is having in order to let her know whether an antibiotic is required.

## 2024-04-23 NOTE — TELEPHONE ENCOUNTER
Called patient to notify of msg below but call dropped. Called back but there was no answer. If patient returns call HUB okay to relay    **Antibiotic for dental procedure sent yesterday.  Should not need antibiotics ordered by us for cosmetic surgery.  Leave that up to the surgeon performing the procedure.

## 2024-04-23 NOTE — TELEPHONE ENCOUNTER
Patient returned call and advised procedure will be a lip lift.     Patient would like a call once prescription has been sent in, if any.    Thank you

## 2024-06-05 DIAGNOSIS — R25.2 LEG CRAMPS: Primary | ICD-10-CM

## 2024-06-07 ENCOUNTER — TELEPHONE (OUTPATIENT)
Dept: INTERNAL MEDICINE | Facility: CLINIC | Age: 69
End: 2024-06-07
Payer: MEDICARE

## 2024-06-07 NOTE — TELEPHONE ENCOUNTER
Provider: DR KENNY    Caller: PHILIP KENNY    Relationship to Patient: SELF    Phone Number: [phone]     Reason for Call: THE FAX NUMBER FOR THIE PATIENT'S CHIROPRACTIC REFERRAL -[phone] FOR JULITA INMAN.

## 2024-06-11 ENCOUNTER — OFFICE VISIT (OUTPATIENT)
Dept: ORTHOPEDIC SURGERY | Facility: CLINIC | Age: 69
End: 2024-06-11
Payer: MEDICARE

## 2024-06-11 VITALS
BODY MASS INDEX: 30.25 KG/M2 | WEIGHT: 177.2 LBS | HEIGHT: 64 IN | DIASTOLIC BLOOD PRESSURE: 62 MMHG | SYSTOLIC BLOOD PRESSURE: 118 MMHG

## 2024-06-11 DIAGNOSIS — Z96.652 STATUS POST TOTAL LEFT KNEE REPLACEMENT: Primary | ICD-10-CM

## 2024-06-11 PROCEDURE — 1159F MED LIST DOCD IN RCRD: CPT | Performed by: ORTHOPAEDIC SURGERY

## 2024-06-11 PROCEDURE — 1160F RVW MEDS BY RX/DR IN RCRD: CPT | Performed by: ORTHOPAEDIC SURGERY

## 2024-06-11 PROCEDURE — 99213 OFFICE O/P EST LOW 20 MIN: CPT | Performed by: ORTHOPAEDIC SURGERY

## 2024-06-11 NOTE — PROGRESS NOTES
Orthopaedic Clinic Note: Knee Established Patient    Chief Complaint   Patient presents with    Follow-up     9 month follow up -- 1 year S/P total knee arthroplasty, left (DOS 5/24/23)        HPI    It has been 9  month(s) since Ms. Rg Tovar's last visit. She returns to clinic today for follow-up left total knee arthroplasty.  Patient is a year out from surgery.  Rates her pain 0/10 on the pain scale.  She is ambulating with no assistive device.  Denies fevers chills or constitutional symptoms.  Overall she is happy with her outcome.    Past Medical History:   Diagnosis Date    Arthritis     Arthritis of neck 2009    Breast injury 03/2017    RT BREAST HIT S/P FALL    Cervical disc disorder 2009    Cervical    Class 1 obesity due to excess calories with serious comorbidity and body mass index (BMI) of 32.0 to 32.9 in adult     Frozen shoulder 2009    Right shoulder    GERD (gastroesophageal reflux disease)     Heartburn     Hip arthrosis 2023    Right hip    Hyperlipidemia     Knee swelling 2022    Left knee    PONV (postoperative nausea and vomiting)     zofran and phen help pt    Psoriasis     scalp    Rotator cuff syndrome 2009    Right shoulder    Scoliosis 8 years ago    Tear of meniscus of knee 2013    LEFT    Wears glasses     readers      Past Surgical History:   Procedure Laterality Date    AUGMENTATION MAMMAPLASTY Bilateral 01/2017    EXPLANTED & REPLACED 04/20/2022    BREAST EXCISIONAL BIOPSY Bilateral 1980    CHOLECYSTECTOMY      with hysterectomy    COLONOSCOPY      FOOT SURGERY  2013    Bilateral bunionectomy . 3rd toe right foot tendon release    HYSTERECTOMY  2004    total    KNEE SURGERY Left 2010    Torn meniscus Left    OOPHORECTOMY Bilateral 2004    REDUCTION MAMMAPLASTY Bilateral 2013    WITH MASTOPEXY    SHOULDER SURGERY Right 2009    Torn rotator cuff repair right    SKIN CANCER EXCISION      TONSILLECTOMY      TOTAL KNEE ARTHROPLASTY Left 05/24/2023    Procedure: TOTAL KNEE  ARTHROPLASTY - LEFT;  Surgeon: Jonas Lipscomb MD;  Location: [facility];  Service: Orthopedics;  Laterality: Left;    WISDOM TOOTH EXTRACTION        Family History   Problem Relation Age of Onset    Arthritis Mother     Hyperlipidemia Mother     Osteoporosis Mother     Hyperlipidemia Father     Diabetes Father     Brain cancer Father     Hyperlipidemia Sister     Hyperlipidemia Sister     Hyperlipidemia Brother     Coronary artery disease Brother     Liver disease Brother     Rheumatologic disease Maternal Aunt     Ovarian cancer Neg Hx     Breast cancer Neg Hx      Social History     Socioeconomic History    Marital status:    Tobacco Use    Smoking status: Never    Smokeless tobacco: Never   Vaping Use    Vaping status: Never Used   Substance and Sexual Activity    Alcohol use: Yes     Alcohol/week: 2.0 standard drinks of alcohol     Types: 2 Glasses of wine per week     Comment: 2-3 drinks per week    Drug use: No    Sexual activity: Not Currently     Partners: Male     Birth control/protection: None      Current Outpatient Medications on File Prior to Visit   Medication Sig Dispense Refill    acetaminophen (TYLENOL) 500 MG tablet Take 2 tablets by mouth Every 8 (Eight) Hours. 42 tablet 0    Ascorbic Acid (VITAMIN C PO) 1000mg once daily      CALCIUM PO Take 2 capsules by mouth Daily.      Coenzyme Q10 400 MG capsule Take 1 capsule by mouth Every Night. 90 capsule 3    CREAM BASE EX Apply  topically to the appropriate area as directed. Apply topically Testosterone 0.3mg/progesterone 25mg/0.5ml TD daily in syringes OK TO FILL 4 MONTH SUPPLY .      estradiol (MINIVELLE, VIVELLE-DOT) 0.05 MG/24HR patch 1 patch 2 (Two) Times a Week.      fluocinonide (LIDEX) 0.05 % external solution Apply  topically to the appropriate area as directed 2 (Two) Times a Day. 60 mL 5    loratadine (CLARITIN) 10 MG tablet Take 1 tablet by mouth Daily.      metFORMIN ER (GLUCOPHAGE-XR) 500 MG 24 hr tablet Take 1 tablet by mouth  "2 (Two) Times a Day With Meals. 180 tablet 3    Mirabegron ER (Myrbetriq) 50 MG tablet sustained-release 24 hour 24 hr tablet Take 50 mg by mouth Daily. 30 tablet 5    NON FORMULARY Indoplex w/DIM  1 capsule daily      NON FORMULARY Reservatrol  Once daily      Nutritional Supplements (DHEA PO) Pt takes 5mg daily      omeprazole (priLOSEC) 40 MG capsule TAKE 1 CAPSULE BY MOUTH DAILY (Patient taking differently: Take 1 capsule by mouth Daily As Needed.) 90 capsule 1    phentermine (ADIPEX-P) 37.5 MG tablet Take 1 tablet by mouth Every Morning Before Breakfast. 30 tablet 2    pravastatin (PRAVACHOL) 10 MG tablet Take 1 tablet by mouth Every Night. 90 tablet 1    solifenacin (VESIcare) 10 MG tablet Take 1 tablet by mouth Daily.      Triamcinolone Acetonide (NASACORT ALLERGY 24HR NA) Daily As Needed. 2 sprays in each nostril per day       No current facility-administered medications on file prior to visit.      Allergies   Allergen Reactions    Statins Myalgia     Other reaction(s): Myalgias (Muscle Pain)          Review of Systems   Constitutional: Negative.    HENT: Negative.     Eyes: Negative.    Respiratory: Negative.     Cardiovascular: Negative.    Gastrointestinal: Negative.    Endocrine: Negative.    Genitourinary: Negative.    Musculoskeletal:  Positive for arthralgias.   Skin: Negative.    Allergic/Immunologic: Negative.    Neurological: Negative.    Hematological: Negative.    Psychiatric/Behavioral: Negative.          The patient's Review of Systems was personally reviewed and confirmed as accurate.    Physical Exam  Blood pressure 118/62, height 162.6 cm (64\"), weight 80.4 kg (177 lb 3.2 oz), not currently breastfeeding.    Body mass index is 30.42 kg/m².    GENERAL APPEARANCE: awake, alert, oriented, in no acute distress and well developed, well nourished  LUNGS:  breathing nonlabored  EXTREMITIES: no clubbing, cyanosis  PERIPHERAL PULSES: palpable dorsalis pedis and posterior tibial pulses bilaterally.  "   GAIT:  Normal        ----------  Left Knee Exam:  ----------  ALIGNMENT: neutral  ----------  RANGE OF MOTION:  Normal (0-120 degrees) with no extensor lag or flexion contracture  LIGAMENTOUS STABILITY:   stable to varus and valgus stress at terminal extension and 30 degrees without any evidence of laxity  ----------  STRENGTH:  KNEE FLEXION 5/5  KNEE EXTENSION  5/5  ANKLE DORSIFLEXION  5/5  ANKLE PLANTARFLEXION  5/5  ----------  PAIN WITH PALPATION:denies tenderness to palpation about the knee  KNEE EFFUSION: no  PAIN WITH KNEE ROM: no  PATELLAR CREPITUS:  no  ----------  SENSATION TO LIGHT TOUCH:  DEEP PERONEAL/SUPERFICIAL PERONEAL/SURAL/SAPHENOUS/TIBIAL:    intact  ----------  EDEMA:  no  ERYTHEMA:    no  WOUNDS/INCISIONS:   yes, well healed surgical incision without evidence of erythema or drainage  _____________________________________________________________________  _____________________________________________________________________    RADIOGRAPHIC FINDINGS:   Indication: Status post left total knee arthroplasty    Comparison: Todays xrays were compared to previous xrays from 9/5/2023    Knee films: Demonstrate well positioned knee arthroplasty components in satisfactory alignment without evidence of wear, loosening, subsidence, fracture, or osteolysis and No significant changes compared to prior radiographs.    Assessment/Plan:   Diagnosis Plan   1. Status post total left knee replacement  XR Knee 3+ View With [place] Left        Patient doing well 1 year status post left total knee arthroplasty.  Recommend activity as tolerated without restrictions.  I will see the patient back in 5 years for repeat evaluation with x-ray 3 views left knee on return.  Patient welcome follow-up sooner should problems arise.    I recommend prophylactic antibiotics prior to invasive procedures indefinitely to minimize risk of prosthetic joint infection.  Amoxicillin 2 g orally 1 hour prior to procedure is  recommended.        Jonas Lipscomb MD  06/11/24  15:27 EDT

## 2024-06-17 ENCOUNTER — TELEPHONE (OUTPATIENT)
Dept: INTERNAL MEDICINE | Facility: CLINIC | Age: 69
End: 2024-06-17

## 2024-06-17 NOTE — TELEPHONE ENCOUNTER
Caller: Stephani Bhardwaj    Relationship: Self    Best call back number: [phone]     What medication are you requesting: SOMETHING FOR A UTI    What are your current symptoms: BURNING,ITCHING,FREQUENCY TO URINATE,SOME STOMACH ACHE    How long have you been experiencing symptoms: WED 6/12/24 AFTER A COLONOSCOPY        If a prescription is needed, what is your preferred pharmacy and phone number[id number] - [place], [place] - [address] [facility] [phone] [facility] [phone]      Additional notes:CALL PATIENT TO DISCUSS

## 2024-06-19 ENCOUNTER — CLINICAL SUPPORT (OUTPATIENT)
Dept: INTERNAL MEDICINE | Facility: CLINIC | Age: 69
End: 2024-06-19
Payer: MEDICARE

## 2024-06-19 DIAGNOSIS — R35.0 FREQUENCY OF URINATION: Primary | ICD-10-CM

## 2024-06-19 LAB
BILIRUB BLD-MCNC: NEGATIVE MG/DL
CLARITY, POC: CLEAR
COLOR UR: YELLOW
EXPIRATION DATE: NORMAL
GLUCOSE UR STRIP-MCNC: NEGATIVE MG/DL
KETONES UR QL: NEGATIVE
LEUKOCYTE EST, POC: NEGATIVE
Lab: NORMAL
NITRITE UR-MCNC: NEGATIVE MG/ML
PH UR: 7 [PH] (ref 5–8)
PROT UR STRIP-MCNC: NEGATIVE MG/DL
RBC # UR STRIP: NEGATIVE /UL
SP GR UR: 1.01 (ref 1–1.03)
UROBILINOGEN UR QL: NORMAL

## 2024-06-19 PROCEDURE — 81003 URINALYSIS AUTO W/O SCOPE: CPT | Performed by: INTERNAL MEDICINE

## 2024-08-06 ENCOUNTER — TELEPHONE (OUTPATIENT)
Dept: INTERNAL MEDICINE | Facility: CLINIC | Age: 69
End: 2024-08-06
Payer: MEDICARE

## 2024-08-06 RX ORDER — ONDANSETRON 4 MG/1
4 TABLET, ORALLY DISINTEGRATING ORAL EVERY 8 HOURS PRN
Qty: 15 TABLET | Refills: 0 | Status: SHIPPED | OUTPATIENT
Start: 2024-08-06

## 2024-08-06 NOTE — TELEPHONE ENCOUNTER
Caller: Stephani Bhardwaj    Relationship: Self    Best call back number: [phone]     What medication are you requesting: ZOFRAN     What are your current symptoms: NO CURRENT SYMPTOMS YET     If a prescription is needed, what is your preferred pharmacy and phone number: [id number] - [place], [place] - [address] AT [facility] [phone] [facility] [phone]      Additional notes: PATIENT HAS BEEN APPROVED FOR OZEMPIC AGAIN AND STATES ON THE FIRST COUPLE DAYS IT MAKES HER VERY NAUSEOUS AND SHE WOULD LIKE THIS PRESCRIBED TO HELP WITH THE NAUSEA.

## 2024-08-12 ENCOUNTER — TELEPHONE (OUTPATIENT)
Dept: INTERNAL MEDICINE | Facility: CLINIC | Age: 69
End: 2024-08-12
Payer: MEDICARE

## 2024-08-12 NOTE — TELEPHONE ENCOUNTER
Spoke with pt and she cannot come in until Wednesday morning. Appt scheduled. You sent in Ozempic back in February, you removed it on 4/10 but there isn't a reason in the chart. Looks like she isn't diabetic and you gave her phentermine for weight loss. She wants to know if she should continue on the Ozempic 2mg next week.

## 2024-08-12 NOTE — TELEPHONE ENCOUNTER
Caller: Stephani Bhardwaj    Relationship: Self    Best call back number: [phone]     Which medication are you concerned about: PHENEGRAN    Who prescribed you this medication: DR. KENNY    What are your concerns: PATIENT STATED THE CURRENT DOSE OF THIS IS NOT WORKING. SHE WOULD LIKE EITHER A STRONGER DOSE OR AN INJECTABLE VERSION. PLEASE CALL IN TO [facility] IN [place], OR CALL TO DISCUSS. SHE ALSO WANTED TO KNOW IF SHE SHOULD CONTINUE ON 2MG OF HER OZEMPIC WHICH IS WHAT IS CAUSING THE NAUSEA.

## 2024-08-14 ENCOUNTER — OFFICE VISIT (OUTPATIENT)
Dept: INTERNAL MEDICINE | Facility: CLINIC | Age: 69
End: 2024-08-14
Payer: MEDICARE

## 2024-08-14 VITALS
HEIGHT: 64 IN | WEIGHT: 178.6 LBS | SYSTOLIC BLOOD PRESSURE: 112 MMHG | DIASTOLIC BLOOD PRESSURE: 82 MMHG | OXYGEN SATURATION: 99 % | BODY MASS INDEX: 30.49 KG/M2 | HEART RATE: 88 BPM | TEMPERATURE: 97.1 F

## 2024-08-14 DIAGNOSIS — E66.09 CLASS 1 OBESITY DUE TO EXCESS CALORIES WITH SERIOUS COMORBIDITY AND BODY MASS INDEX (BMI) OF 30.0 TO 30.9 IN ADULT: Chronic | ICD-10-CM

## 2024-08-14 DIAGNOSIS — K59.03 DRUG-INDUCED CONSTIPATION: Chronic | ICD-10-CM

## 2024-08-14 DIAGNOSIS — T50.905A MEDICATION SIDE EFFECT, INITIAL ENCOUNTER: ICD-10-CM

## 2024-08-14 DIAGNOSIS — R11.2 NAUSEA AND VOMITING, UNSPECIFIED VOMITING TYPE: Primary | ICD-10-CM

## 2024-08-14 PROBLEM — R53.83 OTHER FATIGUE: Status: ACTIVE | Noted: 2024-08-14

## 2024-08-14 PROCEDURE — 1160F RVW MEDS BY RX/DR IN RCRD: CPT | Performed by: INTERNAL MEDICINE

## 2024-08-14 PROCEDURE — 1159F MED LIST DOCD IN RCRD: CPT | Performed by: INTERNAL MEDICINE

## 2024-08-14 PROCEDURE — 99214 OFFICE O/P EST MOD 30 MIN: CPT | Performed by: INTERNAL MEDICINE

## 2024-08-14 PROCEDURE — 1126F AMNT PAIN NOTED NONE PRSNT: CPT | Performed by: INTERNAL MEDICINE

## 2024-08-14 RX ORDER — LORATADINE 10 MG/1
10 TABLET ORAL DAILY
Qty: 90 TABLET | Refills: 1 | Status: SHIPPED | OUTPATIENT
Start: 2024-08-14

## 2024-08-14 RX ORDER — POLYETHYLENE GLYCOL 3350 17 G/17G
17 POWDER, FOR SOLUTION ORAL DAILY
Start: 2024-08-14

## 2024-08-14 RX ORDER — SEMAGLUTIDE 2.68 MG/ML
2 INJECTION, SOLUTION SUBCUTANEOUS WEEKLY
COMMUNITY
Start: 2024-08-08

## 2024-08-14 NOTE — PROGRESS NOTES
[facility]     Stephani Diez Guy  1955   [id number]      Patient Care Team:  Elen Tovar MD as PCP - General (Internal Medicine)    Chief Complaint   Patient presents with    Nausea     Thinks it's from the Ozempic that she just started taking. 2mg thinking she needs to go down to 1mg.        Patient is a 68 y.o. female.   History of Present Illness  The patient is here for an office visit.    She experienced severe nausea after her first 2 mg dose of Ozempic on Sunday, which has since improved but still persists. Previously, she had been on a 1 mg dose without any side effects. She reports no abdominal pain or urinary symptoms. Her appetite has decreased significantly. She has tried Zofran 4 mg for her nausea, but it was ineffective.    She has been dealing with grief and stress due to the loss of three friends, which led to emotional eating. She had previously lost weight on Ozempic but faced issues with insurance coverage. She discontinued phentermine due to jitteriness. She plans to resume metformin tonight, which she had stopped while on Ozempic. She had reduced her metformin intake to once daily in the evening due to stomach upset.    She reports that Ozempic slows her bowel movements, leading to constipation with a stool only about every three days. She has been maintaining high fluid intake and used MiraLAX last night. She also has Colace available. She has been feeling fatigued since starting Ozempic, which has affected her ability to exercise. She noticed crepey skin changes after weight loss on Ozempic previously and wonders if weight lifting could help restore her previous muscle mass. She has a home gym.    She has had a headache since experiencing projectile vomiting on Sunday and Monday, and hopes it will resolve soon. She also noticed small red spots around her eyes.    She needs a refill of her Claritin prescription.         CHRONIC CONDITIONS      Past Medical  History:   Diagnosis Date    Arthritis     Arthritis of neck 2009    Breast injury 03/2017    RT BREAST HIT S/P FALL    Cervical disc disorder 2009    Cervical    Class 1 obesity due to excess calories with serious comorbidity and body mass index (BMI) of 32.0 to 32.9 in adult     Frozen shoulder 2009    Right shoulder    GERD (gastroesophageal reflux disease)     Heartburn     Hip arthrosis 2023    Right hip    Hyperlipidemia     Knee swelling 2022    Left knee    PONV (postoperative nausea and vomiting)     zofran and phen help pt    Psoriasis     scalp    Rotator cuff syndrome 2009    Right shoulder    Scoliosis 8 years ago    Tear of meniscus of knee 2013    LEFT    Wears glasses     readers       Past Surgical History:   Procedure Laterality Date    AUGMENTATION MAMMAPLASTY Bilateral 01/2017    EXPLANTED & REPLACED 04/20/2022    BREAST EXCISIONAL BIOPSY Bilateral 1980    CHOLECYSTECTOMY      with hysterectomy    COLONOSCOPY      FOOT SURGERY  2013    Bilateral bunionectomy . 3rd toe right foot tendon release    HYSTERECTOMY  2004    total    KNEE SURGERY Left 2010    Torn meniscus Left    OOPHORECTOMY Bilateral 2004    REDUCTION MAMMAPLASTY Bilateral 2013    WITH MASTOPEXY    SHOULDER SURGERY Right 2009    Torn rotator cuff repair right    SKIN CANCER EXCISION      TONSILLECTOMY      TOTAL KNEE ARTHROPLASTY Left 05/24/2023    Procedure: TOTAL KNEE ARTHROPLASTY - LEFT;  Surgeon: Jonas Lipscomb MD;  Location: [facility];  Service: Orthopedics;  Laterality: Left;    WISDOM TOOTH EXTRACTION         Family History   Problem Relation Age of Onset    Arthritis Mother     Hyperlipidemia Mother     Osteoporosis Mother     Hyperlipidemia Father     Diabetes Father     Brain cancer Father     Hyperlipidemia Sister     Hyperlipidemia Sister     Hyperlipidemia Brother     Coronary artery disease Brother     Liver disease Brother     Rheumatologic disease Maternal Aunt     Ovarian cancer Neg Hx     Breast cancer Neg Hx   "      Social History     Socioeconomic History    Marital status:    Tobacco Use    Smoking status: Never    Smokeless tobacco: Never   Vaping Use    Vaping status: Never Used   Substance and Sexual Activity    Alcohol use: Yes     Alcohol/week: 2.0 standard drinks of alcohol     Types: 2 Glasses of wine per week     Comment: 2-3 drinks per week    Drug use: No    Sexual activity: Not Currently     Partners: Male     Birth control/protection: None       Allergies   Allergen Reactions    Statins Myalgia     Other reaction(s): Myalgias (Muscle Pain)         Review of Systems:     Review of Systems    Vital Signs  Vitals:    08/14/24 1010   BP: 112/82   BP Location: Left arm   Patient Position: Sitting   Cuff Size: Adult   Pulse: 88   Temp: 97.1 °F (36.2 °C)   SpO2: 99%   Weight: 81 kg (178 lb 9.6 oz)   Height: 162.6 cm (64.02\")   PainSc: 0-No pain     Body mass index is 30.64 kg/m².  BMI is >= 30 and <35. (Class 1 Obesity). The following options were offered after discussion;: weight loss educational material (shared in after visit summary), exercise counseling/recommendations, nutrition counseling/recommendations, and Information on healthy weight added to patient's after visit summary.          Current Outpatient Medications:     acetaminophen (TYLENOL) 500 MG tablet, Take 2 tablets by mouth Every 8 (Eight) Hours., Disp: 42 tablet, Rfl: 0    Ascorbic Acid (VITAMIN C PO), 1000mg once daily, Disp: , Rfl:     CALCIUM PO, Take 2 capsules by mouth Daily., Disp: , Rfl:     Coenzyme Q10 400 MG capsule, Take 1 capsule by mouth Every Night., Disp: 90 capsule, Rfl: 3    CREAM BASE EX, Apply  topically to the appropriate area as directed. Apply topically Testosterone 0.3mg/progesterone 25mg/0.5ml TD daily in syringes OK TO FILL 4 MONTH SUPPLY ., Disp: , Rfl:     estradiol (MINIVELLE, VIVELLE-DOT) 0.05 MG/24HR patch, 1 patch 2 (Two) Times a Week., Disp: , Rfl:     fluocinonide (LIDEX) 0.05 % external solution, Apply  " topically to the appropriate area as directed 2 (Two) Times a Day., Disp: 60 mL, Rfl: 5    loratadine (CLARITIN) 10 MG tablet, Take 1 tablet by mouth Daily., Disp: , Rfl:     metFORMIN ER (GLUCOPHAGE-XR) 500 MG 24 hr tablet, Take 1 tablet by mouth 2 (Two) Times a Day With Meals., Disp: 180 tablet, Rfl: 3    Mirabegron ER (Myrbetriq) 50 MG tablet sustained-release 24 hour 24 hr tablet, Take 50 mg by mouth Daily., Disp: 30 tablet, Rfl: 5    NON FORMULARY, Indoplex w/DIM  1 capsule daily, Disp: , Rfl:     NON FORMULARY, Reservatrol  Once daily, Disp: , Rfl:     Nutritional Supplements (DHEA PO), Pt takes 5mg daily, Disp: , Rfl:     omeprazole (priLOSEC) 40 MG capsule, TAKE 1 CAPSULE BY MOUTH DAILY (Patient taking differently: Take 1 capsule by mouth Daily As Needed.), Disp: 90 capsule, Rfl: 1    ondansetron ODT (ZOFRAN-ODT) 4 MG disintegrating tablet, Place 1 tablet on the tongue Every 8 (Eight) Hours As Needed for Nausea or Vomiting., Disp: 15 tablet, Rfl: 0    Ozempic, 2 MG/DOSE, 8 MG/3ML solution pen-injector, Inject 2 mg under the skin into the appropriate area as directed 1 (One) Time Per Week., Disp: , Rfl:     phentermine (ADIPEX-P) 37.5 MG tablet, Take 1 tablet by mouth Every Morning Before Breakfast., Disp: 30 tablet, Rfl: 2    pravastatin (PRAVACHOL) 10 MG tablet, Take 1 tablet by mouth Every Night., Disp: 90 tablet, Rfl: 1    solifenacin (VESIcare) 10 MG tablet, Take 1 tablet by mouth Daily., Disp: , Rfl:     Triamcinolone Acetonide (NASACORT ALLERGY 24HR NA), Daily As Needed. 2 sprays in each nostril per day, Disp: , Rfl:     Physical Exam:    Physical Exam  Vitals and nursing note reviewed.   Constitutional:       Appearance: She is well-developed. She is obese.   HENT:      Head: Normocephalic.   Eyes:      Conjunctiva/sclera: Conjunctivae normal.      Pupils: Pupils are equal, round, and reactive to light.   Neck:      Thyroid: No thyromegaly.   Cardiovascular:      Rate and Rhythm: Normal rate and  regular rhythm.      Heart sounds: Normal heart sounds.   Pulmonary:      Effort: Pulmonary effort is normal.      Breath sounds: Normal breath sounds. No wheezing.   Abdominal:      Palpations: Abdomen is soft. There is no hepatomegaly or splenomegaly.      Tenderness: There is no abdominal tenderness.   Musculoskeletal:         General: Normal range of motion.      Cervical back: Normal range of motion and neck supple.   Lymphadenopathy:      Cervical: No cervical adenopathy.   Skin:     General: Skin is warm and dry.   Neurological:      Mental Status: She is alert and oriented to person, place, and time.   Psychiatric:         Attention and Perception: Attention normal.         Mood and Affect: Mood normal.         Thought Content: Thought content normal.        ACE III MINI        Results Review:    I reviewed the patient's new clinical results.  Results  Laboratory Studies  Kidney function is good.       CMP:  Lab Results   Component Value Date    BUN 21 04/04/2024    CREATININE 0.81 04/04/2024    EGFRIFNONA 81 01/02/2020    BCR 25.9 (H) 04/04/2024     04/04/2024    K 4.5 04/04/2024    CO2 27.2 04/04/2024    CALCIUM 9.3 04/04/2024    ALBUMIN 4.8 04/04/2024    BILITOT 0.4 04/04/2024    ALKPHOS 70 04/04/2024    AST 20 04/04/2024    ALT 19 04/04/2024     HbA1c:  Lab Results   Component Value Date    HGBA1C 5.40 04/04/2024    HGBA1C 5.10 10/13/2023     Microalbumin:  Lab Results   Component Value Date    MICROALBUR <1.2 10/13/2023     Lipid Panel  Lab Results   Component Value Date    CHOL 190 04/04/2024    TRIG 79 04/04/2024    HDL 65 (H) 04/04/2024     (H) 04/04/2024    AST 20 04/04/2024    ALT 19 04/04/2024       Medication Review: Medications reviewed and noted  Patient Instructions   Problem List Items Addressed This Visit    None        No diagnosis found.  Assessment & Plan  1. Nausea and vomiting.  Medication side effect  She experienced significant nausea after starting Ozempic 2 mg, which  began on Sunday.  She had been on 1 mg Ozempic in the past but had been off of it for quite some time.  The nausea persisted for several days but has slightly improved.  No abdominal pain or fever or chills.  She did not experience nausea with the 1 mg dose or prior to starting the 2 mg dose. A prescription for Ozempic 1 mg will be sent to [facility] [place], with the recommendation to use this dosage for a month before considering an increase to 2 mg. For nausea management, she can take one to two Zofran 4 mg tablets at a time if necessary.    2.  Drug-induced constipation.  She reports constipation associated with Ozempic use. She is advised to maintain high fluid intake and consider using Metamucil fiber or Citrucel fiber supplements daily.  Continue daily MiraLAX.  Drink lots of fluids.  Colace can also be used as needed.    3. Fatigue.  She reports fatigue, which may be a side effect of Ozempic. She is encouraged to engage in light exercise, including walking and using small hand weights at home. Weight workouts in her home gym can be performed three days a week.    Obesity class I with BMI of 30   She will resume Ozempic 1 mg weekly for a month then may move to 2 mg weekly.  Phentermine will be discontinued due to causing jitteriness. Metformin should be taken once daily with the evening meal for a few weeks, after which she can attempt to increase the dosage to twice daily.  She will continue to improve low-fat low sugar diet.  Eat small portions at mealtimes.  Avoid snacking.  Gradually increase walking and physical activity.    A refill for Claritin will be provided to ensure insurance coverage.           Plan of care reviewed with patient at the conclusion of today's visit. Education was provided regarding diagnosis, management, and any prescribed or recommended OTC medications. Patient verbalizes understanding of and agreement with management plan.         08/14/24   10:12 EDT    Patient  or patient representative verbalized consent for the use of Ambient Listening during the visit with  Elen Tovar MD for chart documentation. 8/14/2024  20:56 EDT

## 2024-08-15 NOTE — PATIENT INSTRUCTIONS
Problem List Items Addressed This Visit          Endocrine and Metabolic    Class 1 obesity due to excess calories with serious comorbidity and body mass index (BMI) of 30.0 to 30.9 in adult (Chronic)       Gastrointestinal Abdominal     Drug-induced constipation (Chronic)    Overview     Due to ozempic.         Nausea and vomiting - Primary       Other    Medication side effect, initial encounter     Exercising to Stay Healthy  To become healthy and stay healthy, it is recommended that you do moderate-intensity and vigorous-intensity exercise. You can tell that you are exercising at a moderate intensity if your heart starts beating faster and you start breathing faster but can still hold a conversation. You can tell that you are exercising at a vigorous intensity if you are breathing much harder and faster and cannot hold a conversation while exercising.  How can exercise benefit me?  Exercising regularly is important. It has many health benefits, such as:  Improving overall fitness, flexibility, and endurance.  Increasing bone density.  Helping with weight control.  Decreasing body fat.  Increasing muscle strength and endurance.  Reducing stress and tension, anxiety, depression, or anger.  Improving overall health.  What guidelines should I follow while exercising?  Before you start a new exercise program, talk with your health care provider.  Do not exercise so much that you hurt yourself, feel dizzy, or get very short of breath.  Wear comfortable clothes and wear shoes with good support.  Drink plenty of water while you exercise to prevent dehydration or heat stroke.  Work out until your breathing and your heartbeat get faster (moderate intensity).  How often should I exercise?  Choose an activity that you enjoy, and set realistic goals. Your health care provider can help you make an activity plan that is individually designed and works best for you.  Exercise regularly as told by your health care provider. This  may include:  Doing strength training two times a week, such as:  Lifting weights.  Using resistance bands.  Push-ups.  Sit-ups.  Yoga.  Doing a certain intensity of exercise for a given amount of time. Choose from these options:  A total of 150 minutes of moderate-intensity exercise every week.  A total of 75 minutes of vigorous-intensity exercise every week.  A mix of moderate-intensity and vigorous-intensity exercise every week.  Children, pregnant women, people who have not exercised regularly, people who are overweight, and older adults may need to talk with a health care provider about what activities are safe to perform. If you have a medical condition, be sure to talk with your health care provider before you start a new exercise program.  What are some exercise ideas?  Moderate-intensity exercise ideas include:  Walking 1 mile (1.6 km) in about 15 minutes.  Biking.  Hiking.  Golfing.  Dancing.  Water aerobics.  Vigorous-intensity exercise ideas include:  Walking 4.5 miles (7.2 km) or more in about 1 hour.  Jogging or running 5 miles (8 km) in about 1 hour.  Biking 10 miles (16.1 km) or more in about 1 hour.  Lap swimming.  Roller-skating or in-line skating.  Cross-country skiing.  Vigorous competitive sports, such as football, basketball, and soccer.  Jumping rope.  Aerobic dancing.  What are some everyday activities that can help me get exercise?  Yard work, such as:  Pushing a .  Raking and bagging leaves.  Washing your car.  Pushing a stroller.  Shoveling snow.  Gardening.  Washing windows or floors.  How can I be more active in my day-to-day activities?  Use stairs instead of an elevator.  Take a walk during your lunch break.  If you drive, park your car farther away from your work or school.  If you take public transportation, get off one stop early and walk the rest of the way.  Stand up or walk around during all of your indoor phone calls.  Get up, stretch, and walk around every 30 minutes  throughout the day.  Enjoy exercise with a friend. Support to continue exercising will help you keep a regular routine of activity.  Where to find more information  You can find more information about exercising to stay healthy from:  [place] Department of Health and Human Services: www.hhs.gov  Centers for Disease Control and Prevention (CDC): www.cdc.gov  Summary  Exercising regularly is important. It will improve your overall fitness, flexibility, and endurance.  Regular exercise will also improve your overall health. It can help you control your weight, reduce stress, and improve your bone density.  Do not exercise so much that you hurt yourself, feel dizzy, or get very short of breath.  Before you start a new exercise program, talk with your health care provider.  This information is not intended to replace advice given to you by your health care provider. Make sure you discuss any questions you have with your health care provider.  Document Revised: 04/15/2022 Document Reviewed: 04/15/2022  [employer] Patient Education © 2023 [employer] BMI for Adults  Body mass index (BMI) is a number found using a person's weight and height. BMI can help tell how much of a person's weight is made up of fat. BMI does not measure body fat directly. It is used instead of tests that directly measure body fat, which can be difficult and expensive.  What are BMI measurements used for?  BMI is useful to:  Find out if your weight puts you at higher risk for medical problems.  Help recommend changes, such as in diet and exercise. This can help you reach a healthy weight. BMI screening can be done again to see if these changes are working.  How is BMI calculated?  Your height and weight are measured. The BMI is found from those numbers. This can be done with [place] or metric measurements. Note that charts and online BMI calculators are available to help you find your BMI quickly and easily without doing these calculations.  To calculate  "your BMI in [place] measurements:  Measure your weight in pounds (lb).  Multiply the number of pounds by 703.  So, for an adult who weighs 150 lb, multiply that number by 703: 150 x 703, which equals 105,450.  Measure your height in inches. Then multiply that number by itself to get a measurement called \"inches squared.\"  So, for an adult who is 70 inches tall, the \"inches squared\" measurement is 70 inches x 70 inches, which equals 4,900 inches squared.  Divide the total from step 2 (number of lb x 703) by the total from step 3 (inches squared): 105,450 ÷ 4,900 = 21.5. This is your BMI.  To calculate your BMI in metric measurements:    Measure your weight in kilograms (kg).  For this example, the weight is 70 kg.  Measure your height in meters (m). Then multiply that number by itself to get a measurement called \"meters squared.\"  So, for an adult who is 1.75 m tall, the \"meters squared\" measurement is 1.75 m x 1.75 m, which equals 3.1 meters squared.  Divide the number of kilograms (your weight) by the meters squared number. In this example: 70 ÷ 3.1 = 22.6. This is your BMI.  What do the results mean?  BMI charts are used to see if you are underweight, normal weight, overweight, or obese. The following guidelines will be used:  Underweight: BMI less than 18.5.  Normal weight: BMI between 18.5 and 24.9.  Overweight: BMI between 25 and 29.9.  Obese: BMI of 30 or above.  BMI is a tool and cannot diagnose a condition. Talk with your health care provider about what your BMI means for you. Keep these notes in mind:  Weight includes fat and muscle. Someone with a muscular build, such as an athlete, may have a BMI that is higher than 24.9. In cases like these, BMI is not a correct measure of body fat.  If you have a BMI of 25 or higher, your provider may need to do more testing to find out if excess body fat is the cause.  BMI is measured the same way for males and females. Females usually have more body fat than males of the " same height and weight.  Where to find more information  For more information about BMI, including tools to quickly find your BMI, go to:  Centers for Disease Control and Prevention: cdc.gov  American Heart Association: heart.org  National Heart, Lung, and [employer]: nhlbi.nih.gov  This information is not intended to replace advice given to you by your health care provider. Make sure you discuss any questions you have with your health care provider.  Document Revised: 09/07/2023 Document Reviewed: 08/31/2023  [employer] Patient Education © 2024 [employer]  Heart-Healthy Eating Plan  Many factors influence your heart (coronary) health, including eating and exercise habits. Coronary risk increases with abnormal blood fat (lipid) levels. Heart-healthy meal planning includes limiting unhealthy fats, increasing healthy fats, and making other diet and lifestyle changes.  What is my plan?  Your health care provider may recommend that you:  Limit your fat intake to _________% or less of your total calories each day.  Limit your saturated fat intake to _________% or less of your total calories each day.  Limit the amount of cholesterol in your diet to less than _________ mg per day.  What are tips for following this plan?  Cooking  Cook foods using methods other than frying. Baking, boiling, grilling, and broiling are all good options. Other ways to reduce fat include:  Removing the skin from poultry.  Removing all visible fats from meats.  Steaming vegetables in water or broth.  Meal planning  A plate with examples of foods in a healthy diet.      At meals, imagine dividing your plate into fourths:  Fill one-half of your plate with vegetables and green salads.  Fill one-fourth of your plate with whole grains.  Fill one-fourth of your plate with lean protein foods.  Eat 4-5 servings of vegetables per day. One serving equals 1 cup raw or cooked vegetable, or 2 cups raw leafy greens.  Eat 4-5 servings of fruit per day.  One serving equals 1 medium whole fruit, ¼ cup dried fruit, ½ cup fresh, frozen, or canned fruit, or ½ cup 100% fruit juice.  Eat more foods that contain soluble fiber. Examples include apples, broccoli, carrots, beans, peas, and barley. Aim to get 25-30 g of fiber per day.  Increase your consumption of legumes, nuts, and seeds to 4-5 servings per week. One serving of dried beans or legumes equals ½ cup cooked, 1 serving of nuts is ¼ cup, and 1 serving of seeds equals 1 tablespoon.  Fats  Choose healthy fats more often. Choose monounsaturated and polyunsaturated fats, such as olive and canola oils, flaxseeds, walnuts, almonds, and seeds.  Eat more omega-3 fats. Choose salmon, mackerel, sardines, tuna, flaxseed oil, and ground flaxseeds. Aim to eat fish at least 2 times each week.  Check food labels carefully to identify foods with trans fats or high amounts of saturated fat.  Limit saturated fats. These are found in animal products, such as meats, butter, and cream. Plant sources of saturated fats include palm oil, palm kernel oil, and coconut oil.  Avoid foods with partially hydrogenated oils in them. These contain trans fats. Examples are stick margarine, some tub margarines, cookies, crackers, and other baked goods.  Avoid fried foods.  General information  Eat more home-cooked food and less restaurant, buffet, and fast food.  Limit or avoid alcohol.  Limit foods that are high in starch and sugar.  Lose weight if you are overweight. Losing just 5-10% of your body weight can help your overall health and prevent diseases such as diabetes and heart disease.  Monitor your salt (sodium) intake, especially if you have high blood pressure. Talk with your health care provider about your sodium intake.  Try to incorporate more vegetarian meals weekly.  What foods can I eat?  Fruits  All fresh, canned (in natural juice), or frozen fruits.  Vegetables  Fresh or frozen vegetables (raw, steamed, roasted, or grilled). Green  salads.  Grains  Most grains. Choose whole wheat and whole grains most of the time. Rice and pasta, including brown rice and pastas made with whole wheat.  Meats and other proteins  Lean, well-trimmed beef, veal, pork, and lamb. Chicken and turkey without skin. All fish and shellfish. Wild duck, rabbit, pheasant, and venison. Egg whites or low-cholesterol egg substitutes. Dried beans, peas, lentils, and tofu. Seeds and most nuts.  Dairy  Low-fat or nonfat cheeses, including ricotta and mozzarella. Skim or 1% milk (liquid, powdered, or evaporated). Buttermilk made with low-fat milk. Nonfat or low-fat yogurt.  Fats and oils  Non-hydrogenated (trans-free) margarines. Vegetable oils, including soybean, sesame, sunflower, olive, peanut, safflower, corn, canola, and cottonseed. Salad dressings or mayonnaise made with a vegetable oil.  Beverages  Water (mineral or sparkling). Coffee and tea. Diet carbonated beverages.  Sweets and desserts  Sherbet, gelatin, and fruit ice. Small amounts of dark chocolate.  Limit all sweets and desserts.  Seasonings and condiments  All seasonings and condiments.  The items listed above may not be a complete list of foods and beverages you can eat. Contact a dietitian for more options.  What foods are not recommended?  Fruits  Canned fruit in heavy syrup. Fruit in cream or butter sauce. Fried fruit. Limit coconut.  Vegetables  Vegetables cooked in cheese, cream, or butter sauce. Fried vegetables.  Grains  Breads made with saturated or trans fats, oils, or whole milk. Croissants. Sweet rolls. Donuts. High-fat crackers, such as cheese crackers.  Meats and other proteins  Fatty meats, such as hot dogs, ribs, sausage, buckner, rib-eye roast or steak. High-fat deli meats, such as salami and bologna. Caviar. Domestic duck and goose. Organ meats, such as liver.  Dairy  Cream, sour cream, cream cheese, and creamed cottage cheese. Whole-milk cheeses. Whole or 2% milk (liquid, evaporated, or condensed).  Whole buttermilk. Cream sauce or high-fat cheese sauce. Whole-milk yogurt.  Fats and oils  Meat fat, or shortening. Cocoa butter, hydrogenated oils, palm oil, coconut oil, palm kernel oil. Solid fats and shortenings, including buckner fat, salt pork, lard, and butter. Nondairy cream substitutes. Salad dressings with cheese or sour cream.  Beverages  Regular sodas and any drinks with added sugar.  Sweets and desserts  Frosting. Pudding. Cookies. Cakes. Pies. Milk chocolate or white chocolate. Buttered syrups. Full-fat ice cream or ice cream drinks.  The items listed above may not be a complete list of foods and beverages to avoid. Contact a dietitian for more information.  Summary  Heart-healthy meal planning includes limiting unhealthy fats, increasing healthy fats, and making other diet and lifestyle changes.  Lose weight if you are overweight. Losing just 5-10% of your body weight can help your overall health and prevent diseases such as diabetes and heart disease.  Focus on eating a balance of foods, including fruits and vegetables, low-fat or nonfat dairy, lean protein, nuts and legumes, whole grains, and heart-healthy oils and fats.  This information is not intended to replace advice given to you by your health care provider. Make sure you discuss any questions you have with your health care provider.  Document Revised: 04/28/2022 Document Reviewed: 04/28/2022  [employer] Patient Education © 2022 [employer]

## 2024-09-09 ENCOUNTER — TRANSCRIBE ORDERS (OUTPATIENT)
Dept: ADMINISTRATIVE | Facility: HOSPITAL | Age: 69
End: 2024-09-09
Payer: MEDICARE

## 2024-09-09 DIAGNOSIS — Z12.31 BREAST CANCER SCREENING BY MAMMOGRAM: Primary | ICD-10-CM

## 2024-10-07 ENCOUNTER — TELEPHONE (OUTPATIENT)
Dept: INTERNAL MEDICINE | Facility: CLINIC | Age: 69
End: 2024-10-07
Payer: MEDICARE

## 2024-10-07 DIAGNOSIS — N39.41 URGE URINARY INCONTINENCE: Primary | ICD-10-CM

## 2024-10-07 RX ORDER — TROSPIUM CHLORIDE 20 MG/1
20 TABLET, FILM COATED ORAL DAILY
Qty: 90 TABLET | Refills: 1 | Status: SHIPPED | OUTPATIENT
Start: 2024-10-07

## 2024-10-07 NOTE — TELEPHONE ENCOUNTER
Caller: Stephani Bhardwaj    Relationship: Self    Best call back number: [phone]    Which medication are you concerned about:     Mirabegron ER (Myrbetriq) 50 MG tablet sustained-release 24 hour 24 hr tablet       Who prescribed you this medication: EFRAÍN    When did you start taking this medication:     What are your concerns: THIS MEDICATION IS NOT COVERED UNDER INSURANCE AND WOULD LIKE TO GET A DIFFERENT ONE CALLED IN THAT HAS VERY LITTLE SIDE EFFECTS PER PT    How long have you had these concerns:

## 2024-10-15 RX ORDER — AMOXICILLIN 500 MG/1
CAPSULE ORAL
Qty: 4 CAPSULE | Refills: 0 | Status: SHIPPED | OUTPATIENT
Start: 2024-10-15

## 2024-10-15 NOTE — TELEPHONE ENCOUNTER
Patient is having a dental procedure 10/24 and would like for us to call in the dental premed since her dentist will not do that.     She uses the [facility] in [place].     Could someone please call her when it's sent in please, [phone].

## 2024-10-15 NOTE — TELEPHONE ENCOUNTER
Pended a antibiotic for patient's dental procedure. Please advise, thank you.    Viola Hill CMA ([facility])

## 2024-10-22 RX ORDER — PRAVASTATIN SODIUM 10 MG
10 TABLET ORAL NIGHTLY
Qty: 90 TABLET | Refills: 1 | Status: SHIPPED | OUTPATIENT
Start: 2024-10-22

## 2024-10-28 ENCOUNTER — HOSPITAL ENCOUNTER (OUTPATIENT)
Dept: MAMMOGRAPHY | Facility: HOSPITAL | Age: 69
Discharge: HOME | End: 2024-10-28
Admitting: INTERNAL MEDICINE
Payer: MEDICARE

## 2024-10-28 DIAGNOSIS — Z12.31 BREAST CANCER SCREENING BY MAMMOGRAM: ICD-10-CM

## 2024-10-28 PROCEDURE — 77063 BREAST TOMOSYNTHESIS BI: CPT

## 2024-10-28 PROCEDURE — 77067 SCR MAMMO BI INCL CAD: CPT

## 2024-11-18 RX ORDER — SEMAGLUTIDE 2.68 MG/ML
2 INJECTION, SOLUTION SUBCUTANEOUS WEEKLY
Qty: 3 ML | Refills: 2 | Status: SHIPPED | OUTPATIENT
Start: 2024-11-18

## 2024-11-18 NOTE — TELEPHONE ENCOUNTER
Rx Refill Note  Requested Prescriptions     Pending Prescriptions Disp Refills    Ozempic, 2 MG/DOSE, 8 MG/3ML solution pen-injector [Pharmacy Med Name: OZEMPIC 2MG PER DOSE (8MG/3ML) PFP] 3 mL      Sig: INJECT 2MG UNDER THE SKIN ONCE A WEEK      Last office visit with prescribing clinician: 8/14/2024   Last telemedicine visit with prescribing clinician: Visit date not found   Next office visit with prescribing clinician: 12/12/2024                         Would you like a call back once the refill request has been completed: [] Yes [] No    If the office needs to give you a call back, can they leave a voicemail: [] Yes [] No    Ann Hayes RN  11/18/24, 14:44 EST

## 2024-11-26 RX ORDER — OMEPRAZOLE 40 MG/1
40 CAPSULE, DELAYED RELEASE ORAL DAILY
Qty: 90 CAPSULE | Refills: 1 | Status: SHIPPED | OUTPATIENT
Start: 2024-11-26

## 2024-12-12 ENCOUNTER — OFFICE VISIT (OUTPATIENT)
Dept: INTERNAL MEDICINE | Facility: CLINIC | Age: 69
End: 2024-12-12
Payer: MEDICARE

## 2024-12-12 VITALS
TEMPERATURE: 97.8 F | DIASTOLIC BLOOD PRESSURE: 78 MMHG | BODY MASS INDEX: 29.53 KG/M2 | WEIGHT: 173 LBS | SYSTOLIC BLOOD PRESSURE: 130 MMHG | HEIGHT: 64 IN | HEART RATE: 100 BPM | OXYGEN SATURATION: 99 %

## 2024-12-12 DIAGNOSIS — E78.2 MIXED HYPERLIPIDEMIA: Chronic | ICD-10-CM

## 2024-12-12 DIAGNOSIS — E66.3 OVERWEIGHT WITH BODY MASS INDEX (BMI) OF 29 TO 29.9 IN ADULT: Chronic | ICD-10-CM

## 2024-12-12 DIAGNOSIS — E88.810 METABOLIC SYNDROME: Primary | Chronic | ICD-10-CM

## 2024-12-12 DIAGNOSIS — G25.81 RESTLESS LEGS: ICD-10-CM

## 2024-12-12 DIAGNOSIS — H93.A1 PULSATILE TINNITUS OF RIGHT EAR: Chronic | ICD-10-CM

## 2024-12-12 PROCEDURE — 1160F RVW MEDS BY RX/DR IN RCRD: CPT | Performed by: INTERNAL MEDICINE

## 2024-12-12 PROCEDURE — 1126F AMNT PAIN NOTED NONE PRSNT: CPT | Performed by: INTERNAL MEDICINE

## 2024-12-12 PROCEDURE — 1159F MED LIST DOCD IN RCRD: CPT | Performed by: INTERNAL MEDICINE

## 2024-12-12 PROCEDURE — 99214 OFFICE O/P EST MOD 30 MIN: CPT | Performed by: INTERNAL MEDICINE

## 2024-12-12 RX ORDER — SEMAGLUTIDE 1.34 MG/ML
4 INJECTION, SOLUTION SUBCUTANEOUS WEEKLY
Qty: 3 ML | Refills: 5 | Status: CANCELLED | OUTPATIENT
Start: 2024-12-12

## 2024-12-12 NOTE — PROGRESS NOTES
[facility]     Stephani Diez Guy  1955   [id number]      Patient Care Team:  Elen Tovar MD as PCP - General (Internal Medicine)    Chief Complaint   Patient presents with    Weight Loss        Patient is a 68 y.o. female.   History of Present Illness  The patient presents for a yearly office visit.    She reports experiencing a pulsatile sensation in her right ear, described as akin to hearing her heartbeat. This symptom is predominantly noticeable when she is in a supine position at night and has been present intermittently for the past month. She has been under extreme stress. She has been trying to take some cortisol at night to calm down. The intensity of the sensation varies, with some instances being particularly pronounced. She has not attempted positional changes to alleviate the symptom, as she typically sleeps on her back. She does not experience any associated tinnitus, aural fullness, or hearing loss. Additionally, she reports no sinus congestion. She had suffered from vertigo years ago, it has not recurred.    She has recently resumed modeling and expresses a desire to avoid weight gain, aiming for an additional weight loss of 10 pounds. She is currently on a regimen of Ozempic 2 mg, with approximately 2 weeks' supply remaining. She is considering discontinuing the medication due to financial constraints and concerns about potential weight regain. Her physical activity is limited due to leg issues, but she has made dietary modifications. Despite these efforts, she feels her weight loss has plateaued. She has been on the 2 mg dose of Ozempic for an extended period and has not trialed a higher dose. She reports a significant loss of muscle tone since starting Ozempic, which has decreased her motivation to exercise. However, she has recently begun weight training and enrolled in a workout program. She also takes a probiotic supplement, which she finds beneficial,  particularly in reducing bloating.    She experiences restlessness in her legs at night, a symptom she attributes to her statin medication. This symptom typically resolves once she falls asleep.    FAMILY HISTORY  Her grandmother had restless leg syndrome.    MEDICATIONS  Current: Ozempic         CHRONIC CONDITIONS      Past Medical History:   Diagnosis Date    Arthritis     Arthritis of neck 2009    Breast injury 03/2017    RT BREAST HIT S/P FALL    Cervical disc disorder 2009    Cervical    Class 1 obesity due to excess calories with serious comorbidity and body mass index (BMI) of 32.0 to 32.9 in adult     Frozen shoulder 2009    Right shoulder    GERD (gastroesophageal reflux disease)     Heartburn     Hip arthrosis 2023    Right hip    Hyperlipidemia     Knee swelling 2022    Left knee    PONV (postoperative nausea and vomiting)     zofran and phen help pt    Psoriasis     scalp    Rotator cuff syndrome 2009    Right shoulder    Scoliosis 8 years ago    Tear of meniscus of knee 2013    LEFT    Wears glasses     readers       Past Surgical History:   Procedure Laterality Date    AUGMENTATION MAMMAPLASTY Bilateral 01/2017    EXPLANTED & REPLACED 04/20/2022    BREAST EXCISIONAL BIOPSY Bilateral 1980    CHOLECYSTECTOMY      with hysterectomy    COLONOSCOPY      FOOT SURGERY  2013    Bilateral bunionectomy . 3rd toe right foot tendon release    HYSTERECTOMY  2004    total    KNEE SURGERY Left 2010    Torn meniscus Left    OOPHORECTOMY Bilateral 2004    REDUCTION MAMMAPLASTY Bilateral 2013    WITH MASTOPEXY    SHOULDER SURGERY Right 2009    Torn rotator cuff repair right    SKIN CANCER EXCISION      TONSILLECTOMY      TOTAL KNEE ARTHROPLASTY Left 05/24/2023    Procedure: TOTAL KNEE ARTHROPLASTY - LEFT;  Surgeon: Jonas Lipscomb MD;  Location: [facility];  Service: Orthopedics;  Laterality: Left;    WISDOM TOOTH EXTRACTION         Family History   Problem Relation Age of Onset    Arthritis Mother     Hyperlipidemia  "Mother     Osteoporosis Mother     Hyperlipidemia Father     Diabetes Father     Brain cancer Father     Hyperlipidemia Sister     Hyperlipidemia Sister     Hyperlipidemia Brother     Coronary artery disease Brother     Liver disease Brother     Rheumatologic disease Maternal Aunt     Ovarian cancer Neg Hx     Breast cancer Neg Hx        Social History     Socioeconomic History    Marital status:    Tobacco Use    Smoking status: Never    Smokeless tobacco: Never   Vaping Use    Vaping status: Never Used   Substance and Sexual Activity    Alcohol use: Yes     Alcohol/week: 2.0 standard drinks of alcohol     Types: 2 Glasses of wine per week     Comment: 2-3 drinks per week    Drug use: No    Sexual activity: Not Currently     Partners: Male     Birth control/protection: None       Allergies   Allergen Reactions    Statins Myalgia     Other reaction(s): Myalgias (Muscle Pain)         Review of Systems:     Review of Systems    Vital Signs  Vitals:    12/12/24 1104   BP: 130/78   BP Location: Left arm   Patient Position: Sitting   Cuff Size: Adult   Pulse: 100   Temp: 97.8 °F (36.6 °C)   TempSrc: Infrared   SpO2: 99%   Weight: 78.5 kg (173 lb)   Height: 162.6 cm (64.02\")   PainSc: 0-No pain     Body mass index is 29.68 kg/m².  BMI is >= 25 and <30. (Overweight) The following options were offered after discussion;: weight loss educational material (shared in after visit summary), exercise counseling/recommendations, nutrition counseling/recommendations, and Information on healthy weight added to patient's after visit summary.          Current Outpatient Medications:     Ascorbic Acid (VITAMIN C PO), 1000mg once daily, Disp: , Rfl:     Coenzyme Q10 400 MG capsule, Take 1 capsule by mouth Every Night., Disp: 90 capsule, Rfl: 3    CREAM BASE EX, Apply  topically to the appropriate area as directed. Apply topically Testosterone 0.3mg/progesterone 40/0.2mf/0.5ml TD daily in syringes OK TO FILL 4 MONTH SUPPLY ., Disp: " , Rfl:     estradiol (MINIVELLE, VIVELLE-DOT) 0.05 MG/24HR patch, 1 patch 2 (Two) Times a Week. 0.1mg twice a week, Disp: , Rfl:     fluocinonide (LIDEX) 0.05 % external solution, Apply  topically to the appropriate area as directed 2 (Two) Times a Day., Disp: 60 mL, Rfl: 5    loratadine (CLARITIN) 10 MG tablet, Take 1 tablet by mouth Daily., Disp: 90 tablet, Rfl: 1    metFORMIN ER (GLUCOPHAGE-XR) 500 MG 24 hr tablet, Take 1 tablet by mouth 2 (Two) Times a Day With Meals., Disp: 180 tablet, Rfl: 3    NON FORMULARY, Indoplex w/DIM  1 capsule daily, Disp: , Rfl:     NON FORMULARY, Reservatrol  Once daily, Disp: , Rfl:     Nutritional Supplements (DHEA PO), Pt takes 5mg daily, Disp: , Rfl:     omeprazole (priLOSEC) 40 MG capsule, TAKE 1 CAPSULE BY MOUTH DAILY, Disp: 90 capsule, Rfl: 1    polyethylene glycol (SB Polyethylene Glycol 3350) 17 GM/SCOOP powder, Take 17 g by mouth Daily., Disp: , Rfl:     pravastatin (PRAVACHOL) 10 MG tablet, TAKE 1 TABLET BY MOUTH EVERY NIGHT, Disp: 90 tablet, Rfl: 1    Triamcinolone Acetonide (NASACORT ALLERGY 24HR NA), Daily As Needed. 2 sprays in each nostril per day, Disp: , Rfl:     trospium (SANCTURA) 20 MG tablet, Take 1 tablet by mouth Daily., Disp: 90 tablet, Rfl: 1    Semaglutide-Weight Management 2.4 MG/0.75ML solution auto-injector, Inject 2.4 mg under the skin into the appropriate area as directed 1 (One) Time Per Week., Disp: 3 mL, Rfl: 5    Physical Exam:    Physical Exam  Vitals and nursing note reviewed.   Constitutional:       Appearance: She is well-developed and overweight.   HENT:      Head: Normocephalic.      Right Ear: Tympanic membrane and ear canal normal.      Left Ear: Tympanic membrane and ear canal normal.   Eyes:      Conjunctiva/sclera: Conjunctivae normal.      Pupils: Pupils are equal, round, and reactive to light.   Neck:      Thyroid: No thyromegaly.   Cardiovascular:      Rate and Rhythm: Normal rate and regular rhythm.      Heart sounds: Normal heart  sounds.   Pulmonary:      Effort: Pulmonary effort is normal.      Breath sounds: Normal breath sounds. No wheezing.   Musculoskeletal:         General: Normal range of motion.      Cervical back: Normal range of motion and neck supple.   Lymphadenopathy:      Cervical: No cervical adenopathy.   Skin:     General: Skin is warm and dry.   Neurological:      Mental Status: She is alert and oriented to person, place, and time.   Psychiatric:         Attention and Perception: Attention normal.         Mood and Affect: Mood normal.         Thought Content: Thought content normal.        ACE III MINI        Results Review:    None  Results         CMP:  Lab Results   Component Value Date    Glucose 82 04/04/2024    Glucose 84 05/24/2023    Glucose, UA Negative 06/19/2024    Glucose, UA Negative 04/10/2024    BUN 21 04/04/2024    BUN/Creatinine Ratio 25.9 (H) 04/04/2024    Creatinine 0.81 04/04/2024    Creatinine, Urine 38.6 10/13/2023    Ketones, UA Negative 06/19/2024    Ketones, UA Negative 04/10/2024    CO2 27.2 04/04/2024    Calcium 9.3 04/04/2024    Albumin 4.8 04/04/2024    AST (SGOT) 20 04/04/2024    ALT (SGPT) 19 04/04/2024     HbA1c:  Lab Results   Component Value Date    HGBA1C 5.40 04/04/2024    HGBA1C 5.10 10/13/2023     Microalbumin:  Lab Results   Component Value Date    MICROALBUR <1.2 10/13/2023     Lipid Panel  Lab Results   Component Value Date    CHOL 190 04/04/2024    TRIG 79 04/04/2024    HDL 65 (H) 04/04/2024     (H) 04/04/2024    AST 20 04/04/2024    ALT 19 04/04/2024       Medication Review: Medications reviewed and noted  Patient Instructions   Problem List Items Addressed This Visit          ENT    Pulsatile tinnitus of right ear - Primary (Chronic)    Overview     Occurs only when lying down on her back at night. Denies pain or hearing loss.            Endocrine and Metabolic    Metabolic syndrome (Chronic)    Overview     Metformin  twice a day with meals.  Semaglutide weekly.             Other    Overweight with body mass index (BMI) of 29 to 29.9 in adult (Chronic)          Diagnosis Plan   1. Pulsatile tinnitus of right ear        2. Metabolic syndrome        3. Overweight with body mass index (BMI) of 29 to 29.9 in adult          Assessment & Plan    Metabolic syndrome and overweight with BMI of 29.  She has achieved a commendable weight loss of 9 pounds since April 2024, with a current BMI of 29.7. She was encouraged to incorporate aerobic exercises into her routine and maintain a healthy diet. She was informed that over 80% of people tend to regain weight after discontinuing Ozempic, regardless of the dosage. She was advised to continue Ozempic until she reaches her desired weight, then maintain the weight for a few months before gradually tapering off the medication. She was also advised to engage in regular gym workouts to maintain muscle tone. A prescription for Ozempic 2.4 mg was provided, to be administered as a weekly injection. If the insurance does not cover the 2.4 mg dose, she can contact us to discuss alternative options such as semaglutide tablets.  Also try to get protein in the diet 3 times a day to help maintain muscle mass and strength.    Pulsatile tinnitus.  The symptom is likely transient and may resolve spontaneously. The fact that it is only heard at night is a positive sign. She was advised to avoid using Q-tips for ear cleaning. In case of itchiness or wax accumulation, the use of peroxide drops was recommended. She was also suggested to try positional changes during episodes of pulsatile tinnitus and consider using a noise maker in her bedroom. If the symptom worsens or becomes constant, she should inform us.    Restless legs.  The symptom could potentially be a side effect of her statin medication, although it is also common in the general population and can be stress-induced.  I recommend staying well-hydrated.  Get plenty of exercise during the day and rest before  bedtime.     Mixed hyperlipidemia   continue pravastatin nightly.  Continue low-fat healthy diet and try to get more exercise.          Plan of care reviewed with patient at the conclusion of today's visit. Education was provided regarding diagnosis, management, and any prescribed or recommended OTC medications. Patient verbalizes understanding of and agreement with management plan.         12/12/24   11:05 EST    Patient or patient representative verbalized consent for the use of Ambient Listening during the visit with  Elen Tovar MD for chart documentation. 12/12/2024  11:57 EST

## 2024-12-13 PROBLEM — G25.81 RESTLESS LEGS: Status: ACTIVE | Noted: 2024-12-13

## 2024-12-13 NOTE — PATIENT INSTRUCTIONS
Patient Instructions   Problem List Items Addressed This Visit          ENT    Pulsatile tinnitus of right ear - Primary (Chronic)    Overview     Occurs only when lying down on her back at night. Denies pain or hearing loss.            Endocrine and Metabolic    Metabolic syndrome (Chronic)    Overview     Metformin  twice a day with meals.  Semaglutide weekly.            Other    Overweight with body mass index (BMI) of 29 to 29.9 in adult (Chronic)       Exercising to Stay Healthy  To become healthy and stay healthy, it is recommended that you do moderate-intensity and vigorous-intensity exercise. You can tell that you are exercising at a moderate intensity if your heart starts beating faster and you start breathing faster but can still hold a conversation. You can tell that you are exercising at a vigorous intensity if you are breathing much harder and faster and cannot hold a conversation while exercising.  How can exercise benefit me?  Exercising regularly is important. It has many health benefits, such as:  Improving overall fitness, flexibility, and endurance.  Increasing bone density.  Helping with weight control.  Decreasing body fat.  Increasing muscle strength and endurance.  Reducing stress and tension, anxiety, depression, or anger.  Improving overall health.  What guidelines should I follow while exercising?  Before you start a new exercise program, talk with your health care provider.  Do not exercise so much that you hurt yourself, feel dizzy, or get very short of breath.  Wear comfortable clothes and wear shoes with good support.  Drink plenty of water while you exercise to prevent dehydration or heat stroke.  Work out until your breathing and your heartbeat get faster (moderate intensity).  How often should I exercise?  Choose an activity that you enjoy, and set realistic goals. Your health care provider can help you make an activity plan that is individually designed and works best for  you.  Exercise regularly as told by your health care provider. This may include:  Doing strength training two times a week, such as:  Lifting weights.  Using resistance bands.  Push-ups.  Sit-ups.  Yoga.  Doing a certain intensity of exercise for a given amount of time. Choose from these options:  A total of 150 minutes of moderate-intensity exercise every week.  A total of 75 minutes of vigorous-intensity exercise every week.  A mix of moderate-intensity and vigorous-intensity exercise every week.  Children, pregnant women, people who have not exercised regularly, people who are overweight, and older adults may need to talk with a health care provider about what activities are safe to perform. If you have a medical condition, be sure to talk with your health care provider before you start a new exercise program.  What are some exercise ideas?  Moderate-intensity exercise ideas include:  Walking 1 mile (1.6 km) in about 15 minutes.  Biking.  Hiking.  Golfing.  Dancing.  Water aerobics.  Vigorous-intensity exercise ideas include:  Walking 4.5 miles (7.2 km) or more in about 1 hour.  Jogging or running 5 miles (8 km) in about 1 hour.  Biking 10 miles (16.1 km) or more in about 1 hour.  Lap swimming.  Roller-skating or in-line skating.  Cross-country skiing.  Vigorous competitive sports, such as football, basketball, and soccer.  Jumping rope.  Aerobic dancing.  What are some everyday activities that can help me get exercise?  Yard work, such as:  Pushing a .  Raking and bagging leaves.  Washing your car.  Pushing a stroller.  Shoveling snow.  Gardening.  Washing windows or floors.  How can I be more active in my day-to-day activities?  Use stairs instead of an elevator.  Take a walk during your lunch break.  If you drive, park your car farther away from your work or school.  If you take public transportation, get off one stop early and walk the rest of the way.  Stand up or walk around during all of your  indoor phone calls.  Get up, stretch, and walk around every 30 minutes throughout the day.  Enjoy exercise with a friend. Support to continue exercising will help you keep a regular routine of activity.  Where to find more information  You can find more information about exercising to stay healthy from:  [place] Department of Health and Human Services: www.hhs.gov  Centers for Disease Control and Prevention (CDC): www.cdc.gov  Summary  Exercising regularly is important. It will improve your overall fitness, flexibility, and endurance.  Regular exercise will also improve your overall health. It can help you control your weight, reduce stress, and improve your bone density.  Do not exercise so much that you hurt yourself, feel dizzy, or get very short of breath.  Before you start a new exercise program, talk with your health care provider.  This information is not intended to replace advice given to you by your health care provider. Make sure you discuss any questions you have with your health care provider.  Document Revised: 04/15/2022 Document Reviewed: 04/15/2022  [employer] Patient Education © 2023 [employer] BMI for Adults  Body mass index (BMI) is a number found using a person's weight and height. BMI can help tell how much of a person's weight is made up of fat. BMI does not measure body fat directly. It is used instead of tests that directly measure body fat, which can be difficult and expensive.  What are BMI measurements used for?  BMI is useful to:  Find out if your weight puts you at higher risk for medical problems.  Help recommend changes, such as in diet and exercise. This can help you reach a healthy weight. BMI screening can be done again to see if these changes are working.  How is BMI calculated?  Your height and weight are measured. The BMI is found from those numbers. This can be done with U[place] or metric measurements. Note that charts and online BMI calculators are available to help you find your  "BMI quickly and easily without doing these calculations.  To calculate your BMI in U[place] measurements:  Measure your weight in pounds (lb).  Multiply the number of pounds by 703.  So, for an adult who weighs 150 lb, multiply that number by 703: 150 x 703, which equals 105,450.  Measure your height in inches. Then multiply that number by itself to get a measurement called \"inches squared.\"  So, for an adult who is 70 inches tall, the \"inches squared\" measurement is 70 inches x 70 inches, which equals 4,900 inches squared.  Divide the total from step 2 (number of lb x 703) by the total from step 3 (inches squared): 105,450 ÷ 4,900 = 21.5. This is your BMI.  To calculate your BMI in metric measurements:    Measure your weight in kilograms (kg).  For this example, the weight is 70 kg.  Measure your height in meters (m). Then multiply that number by itself to get a measurement called \"meters squared.\"  So, for an adult who is 1.75 m tall, the \"meters squared\" measurement is 1.75 m x 1.75 m, which equals 3.1 meters squared.  Divide the number of kilograms (your weight) by the meters squared number. In this example: 70 ÷ 3.1 = 22.6. This is your BMI.  What do the results mean?  BMI charts are used to see if you are underweight, normal weight, overweight, or obese. The following guidelines will be used:  Underweight: BMI less than 18.5.  Normal weight: BMI between 18.5 and 24.9.  Overweight: BMI between 25 and 29.9.  Obese: BMI of 30 or above.  BMI is a tool and cannot diagnose a condition. Talk with your health care provider about what your BMI means for you. Keep these notes in mind:  Weight includes fat and muscle. Someone with a muscular build, such as an athlete, may have a BMI that is higher than 24.9. In cases like these, BMI is not a correct measure of body fat.  If you have a BMI of 25 or higher, your provider may need to do more testing to find out if excess body fat is the cause.  BMI is measured the same way for " males and females. Females usually have more body fat than males of the same height and weight.  Where to find more information  For more information about BMI, including tools to quickly find your BMI, go to:  Centers for Disease Control and Prevention: cdc.gov  American Heart Association: heart.org  National Heart, Lung, and Blood [place]: nhlbi.nih.gov  This information is not intended to replace advice given to you by your health care provider. Make sure you discuss any questions you have with your health care provider.  Document Revised: 09/07/2023 Document Reviewed: 08/31/2023  Elsevier Patient Education © 2024 [employer]

## 2024-12-16 ENCOUNTER — TELEPHONE (OUTPATIENT)
Dept: INTERNAL MEDICINE | Facility: CLINIC | Age: 69
End: 2024-12-16
Payer: MEDICARE

## 2024-12-16 NOTE — TELEPHONE ENCOUNTER
PATIENT HAS CALLED AND STATED INSURANCE WILL NOT COVER PRESCRIPTION FOR LARGER DOSAGE OF THE OZEMPIC. PATIENT STATES SHE WAS TO LET PCP KNOW IF MEDICATION IS NOT GOING TO BE COVERED SO PCP AN COME UP WITH AN ALTERNATIVE. PATIENT IS REQUESTING A CALL BACK WITH NEXT STEPS IN PLAN OF CARE.    CALL BACK NUMBER [phone]

## 2024-12-16 NOTE — TELEPHONE ENCOUNTER
Left VM for pt to return call.    [Fatigue] : fatigue [Negative] : Heme/Lymph [Muscle Pain] : no muscle pain

## 2025-04-10 ENCOUNTER — OFFICE VISIT (OUTPATIENT)
Dept: INTERNAL MEDICINE | Facility: CLINIC | Age: 70
End: 2025-04-10
Payer: MEDICARE

## 2025-04-10 VITALS
TEMPERATURE: 98.4 F | WEIGHT: 186.4 LBS | BODY MASS INDEX: 31.82 KG/M2 | SYSTOLIC BLOOD PRESSURE: 136 MMHG | OXYGEN SATURATION: 98 % | HEIGHT: 64 IN | DIASTOLIC BLOOD PRESSURE: 74 MMHG | HEART RATE: 88 BPM

## 2025-04-10 DIAGNOSIS — E88.810 METABOLIC SYNDROME: Chronic | ICD-10-CM

## 2025-04-10 DIAGNOSIS — E78.2 MIXED HYPERLIPIDEMIA: Chronic | ICD-10-CM

## 2025-04-10 DIAGNOSIS — R35.0 URINARY FREQUENCY: Chronic | ICD-10-CM

## 2025-04-10 DIAGNOSIS — Z78.9 STATIN INTOLERANCE: Chronic | ICD-10-CM

## 2025-04-10 DIAGNOSIS — M25.552 PAIN OF LEFT HIP: Chronic | ICD-10-CM

## 2025-04-10 DIAGNOSIS — Z00.00 MEDICARE ANNUAL WELLNESS VISIT, SUBSEQUENT: Primary | ICD-10-CM

## 2025-04-10 DIAGNOSIS — E66.09 CLASS 1 OBESITY DUE TO EXCESS CALORIES WITH SERIOUS COMORBIDITY AND BODY MASS INDEX (BMI) OF 32.0 TO 32.9 IN ADULT: Chronic | ICD-10-CM

## 2025-04-10 DIAGNOSIS — E66.811 CLASS 1 OBESITY DUE TO EXCESS CALORIES WITH SERIOUS COMORBIDITY AND BODY MASS INDEX (BMI) OF 32.0 TO 32.9 IN ADULT: Chronic | ICD-10-CM

## 2025-04-10 DIAGNOSIS — E55.9 VITAMIN D DEFICIENCY: ICD-10-CM

## 2025-04-10 DIAGNOSIS — R79.9 ABNORMAL FINDING OF BLOOD CHEMISTRY, UNSPECIFIED: ICD-10-CM

## 2025-04-10 PROBLEM — E66.3 OVERWEIGHT WITH BODY MASS INDEX (BMI) OF 29 TO 29.9 IN ADULT: Chronic | Status: RESOLVED | Noted: 2022-12-13 | Resolved: 2025-04-10

## 2025-04-10 RX ORDER — ESTRADIOL 0.1 MG/D
1 FILM, EXTENDED RELEASE TRANSDERMAL 2 TIMES WEEKLY
COMMUNITY

## 2025-04-10 RX ORDER — ESTRADIOL 0.05 MG/D
1 PATCH, EXTENDED RELEASE TRANSDERMAL 2 TIMES WEEKLY
Start: 2025-04-10 | End: 2025-04-10

## 2025-04-10 RX ORDER — SEMAGLUTIDE 0.68 MG/ML
0.25 INJECTION, SOLUTION SUBCUTANEOUS WEEKLY
Qty: 3 ML | Refills: 5 | Status: SHIPPED | OUTPATIENT
Start: 2025-04-10

## 2025-04-10 RX ORDER — METFORMIN HYDROCHLORIDE 500 MG/1
500 TABLET, EXTENDED RELEASE ORAL 2 TIMES DAILY WITH MEALS
Qty: 180 TABLET | Refills: 3 | Status: SHIPPED | OUTPATIENT
Start: 2025-04-10

## 2025-04-10 RX ORDER — TOLTERODINE 4 MG/1
4 CAPSULE, EXTENDED RELEASE ORAL DAILY
Qty: 90 CAPSULE | Refills: 1 | Status: SHIPPED | OUTPATIENT
Start: 2025-04-10

## 2025-04-10 NOTE — PROGRESS NOTES
Subjective   The ABCs of the Annual Wellness Visit  Medicare Wellness Visit      Stephani Tovar is a 69 y.o. patient who presents for a Medicare Wellness Visit.    The following portions of the patient's history were reviewed and   updated as appropriate: allergies, current medications, past family history, past medical history, past social history, past surgical history, and problem list.    Compared to one year ago, the patient's physical   health is the same.  Compared to one year ago, the patient's mental   health is the same.    Recent Hospitalizations:  She was not admitted to the hospital during the last year.     Current Medical Providers:  Patient Care Team:  Elen Tovar MD as PCP - General (Internal Medicine)  Jonah Mosley Jr., MD as Consulting Physician (Urology)  Dulce Maria Gonzalez DO as Consulting Physician (Family Medicine)  Kirt Azar DO (Dermatology)    Outpatient Medications Prior to Visit   Medication Sig Dispense Refill    Ascorbic Acid (VITAMIN C PO) 1000mg once daily      Berberine Chloride (BERBERINE HCI PO) Take  by mouth.      Coenzyme Q10 400 MG capsule Take 1 capsule by mouth Every Night. 90 capsule 3    CREAM BASE EX Apply  topically to the appropriate area as directed. Apply topically Testosterone 0.5mg/progesterone 40/0.2mg/0.5ml TD daily in syringes OK TO FILL 4 MONTH SUPPLY .      fluocinonide (LIDEX) 0.05 % external solution Apply  topically to the appropriate area as directed 2 (Two) Times a Day. 60 mL 5    loratadine (CLARITIN) 10 MG tablet Take 1 tablet by mouth Daily. 90 tablet 1    NON FORMULARY Indoplex w/DIM  1 capsule daily      NON FORMULARY Reservatrol  Once daily      Nutritional Supplements (DHEA PO) Pt takes 5mg daily      omeprazole (priLOSEC) 40 MG capsule TAKE 1 CAPSULE BY MOUTH DAILY 90 capsule 1    Triamcinolone Acetonide (NASACORT ALLERGY 24HR NA) Daily As Needed. 2 sprays in each nostril per day      estradiol  (MINIVELLE, VIVELLE-DOT) 0.05 MG/24HR patch 1 patch 2 (Two) Times a Week. 0.1mg twice a week      metFORMIN ER (GLUCOPHAGE-XR) 500 MG 24 hr tablet Take 1 tablet by mouth 2 (Two) Times a Day With Meals. 180 tablet 3    trospium (SANCTURA) 20 MG tablet Take 1 tablet by mouth Daily. 90 tablet 1    polyethylene glycol (SB Polyethylene Glycol 3350) 17 GM/SCOOP powder Take 17 g by mouth Daily. (Patient not taking: Reported on 4/10/2025)      pravastatin (PRAVACHOL) 10 MG tablet TAKE 1 TABLET BY MOUTH EVERY NIGHT (Patient not taking: Reported on 4/10/2025) 90 tablet 1    Semaglutide, 2 MG/DOSE, (OZEMPIC) 8 MG/3ML solution pen-injector Inject 2 mg under the skin into the appropriate area as directed 1 (One) Time Per Week. (Patient not taking: Reported on 4/10/2025) 3 mL 5     No facility-administered medications prior to visit.     No opioid medication identified on active medication list. I have reviewed chart for other potential  high risk medication/s and harmful drug interactions in the elderly.      Aspirin is not on active medication list.  Aspirin use is not indicated based on review of current medical condition/s. Risk of harm outweighs potential benefits.  .    Patient Active Problem List   Diagnosis    Mixed hyperlipidemia    Statin intolerance    Chronic pain of left knee    Psoriasis of scalp    Family history of fatty liver    Perennial allergic rhinitis with seasonal variation    Herpes labialis without complication    Gastroesophageal reflux disease without esophagitis    Epistaxis, recurrent    Urinary frequency    Dry skin    Metabolic syndrome    Primary osteoarthritis of left knee    Status post total left knee replacement    Leg cramps    Menopausal and postmenopausal disorder    Other fatigue    Nausea and vomiting    Medication side effect, initial encounter    Drug-induced constipation    Pulsatile tinnitus of right ear    Restless legs    Pain of left hip    Class 1 obesity due to excess calories  "with serious comorbidity and body mass index (BMI) of 32.0 to 32.9 in adult     Advance Care Planning Advance Directive is on file.  ACP discussion was held with the patient during this visit. Patient has an advance directive in EMR which is still valid.             Objective   Vitals:    04/10/25 1019   BP: 136/74   BP Location: Left arm   Patient Position: Sitting   Cuff Size: Adult   Pulse: 88   Temp: 98.4 °F (36.9 °C)   TempSrc: Infrared   SpO2: 98%   Weight: 84.6 kg (186 lb 6.4 oz)   Height: 162.6 cm (64.02\")   PainSc: 0-No pain       Estimated body mass index is 31.98 kg/m² as calculated from the following:    Height as of this encounter: 162.6 cm (64.02\").    Weight as of this encounter: 84.6 kg (186 lb 6.4 oz).                Does the patient have evidence of cognitive impairment? No                                                                                                Health  Risk Assessment    Smoking Status:  Social History     Tobacco Use   Smoking Status Never   Smokeless Tobacco Never     Alcohol Consumption:  Social History     Substance and Sexual Activity   Alcohol Use Yes    Alcohol/week: 2.0 standard drinks of alcohol    Types: 2 Glasses of wine per week    Comment: 2-3 drinks per week       Fall Risk Screen  STEADI Fall Risk Assessment was completed, and patient is at LOW risk for falls.Assessment completed on:4/10/2025    Depression Screening   Little interest or pleasure in doing things? Not at all   Feeling down, depressed, or hopeless? Not at all   PHQ-2 Total Score 0      Health Habits and Functional and Cognitive Screenin/3/2025     7:37 AM   Functional & Cognitive Status   Do you have difficulty preparing food and eating? No   Do you have difficulty bathing yourself, getting dressed or grooming yourself? No   Do you have difficulty using the toilet? No   Do you have difficulty moving around from place to place? No   Do you have trouble with steps or getting out of a bed or " a chair? No   Current Diet Well Balanced Diet   Dental Exam Up to date   Eye Exam Up to date   Exercise (times per week) 3 times per week   Current Exercises Include Home Exercise Program (TV, Computer, Etc.);Home Fitness Gym;House Cleaning;Kettle [place];Light Weights;Pilates;Treadmill;Walking   Do you need help using the phone?  No   Are you deaf or do you have serious difficulty hearing?  No   Do you need help to go to places out of walking distance? No   Do you need help shopping? No   Do you need help preparing meals?  No   Do you need help with housework?  No   Do you need help with laundry? No   Do you need help taking your medications? No   Do you need help managing money? No   Do you ever drive or ride in a car without wearing a seat belt? No   Have you felt unusual stress, anger or loneliness in the last month? Yes   Who do you live with? Spouse   If you need help, do you have trouble finding someone available to you? No   Have you been bothered in the last four weeks by sexual problems? No   Do you have difficulty concentrating, remembering or making decisions? No           Age-appropriate Screening Schedule:  Refer to the list below for future screening recommendations based on patient's age, sex and/or medical conditions. Orders for these recommended tests are listed in the plan section. The patient has been provided with a written plan.    Health Maintenance List  Health Maintenance   Topic Date Due    HEPATITIS C SCREENING  Never done    LIPID PANEL  04/25/2025    INFLUENZA VACCINE  07/01/2025    DXA SCAN  10/24/2025    ANNUAL WELLNESS VISIT  04/10/2026    MAMMOGRAM  10/30/2026    TDAP/TD VACCINES (3 - Td or Tdap) 10/07/2030    COLORECTAL CANCER SCREENING  06/03/2031    Pneumococcal Vaccine 50+  Completed    ZOSTER VACCINE  Completed    COVID-19 Vaccine  Discontinued                                                                                                                                                 CMS Preventative Services Quick Reference  Risk Factors Identified During Encounter  None Identified    The above risks/problems have been discussed with the patient.  Pertinent information has been shared with the patient in the After Visit Summary.  An After Visit Summary and PPPS were made available to the patient.         The patient is here for an initial Medicare wellness visit as well as evaluation of hip pain, urinary retention, weight management, and medication management.    She reports experiencing significant hip pain, a new symptom that has been present for the past week. The pain is localized under the buttocks and is described as sharp, although it has lessened in intensity today. She suspects it may be related to arthritis from a previous fall. She does not report any recent changes in physical activity, including walking or climbing. She also does not report any other aches associated with weather changes. Aleve has been effective in managing the pain, and she does not report any gastrointestinal side effects from the medication.    She has discontinued pravastatin due to persistent leg twitching, which she describes as restless legs. She has recently started cabergoline extended release, taking one dose in the morning and another at night.    She reports that her current medication for urinary retention, trospium, is no longer effective, leading to frequent restroom visits. She has tried several different medications, including Gemtesa 75 mg, which was effective but caused dry mouth and was not covered by her insurance. She has also tried Vesicare, which she believes may have caused dry mouth, and Myrbetriq, which was not effective. She has not seen a urologist since her initial visit with Dr. Muñoz, who recommended Gemtesa. She does not report any vaginal dryness. She has also tried Detrol and tolterodine.    She discontinued Ozempic in 11/2024 due to insurance coverage issues and reports  "significant muscle loss while on the medication. Her exercise routine has been disrupted due to her 's illness and subsequent emergency surgery, and she has not exercised in the past month. She occasionally skips breakfast and is currently taking metformin once daily.    She reports no issues with constipation and notes that metformin helps with this. She regularly sees a dermatologist, Dr. Azar, due to a history of melanoma. She had some lesions removed from her chest by him. She underwent a colonoscopy in 06/2024, which revealed one polyp, and was advised to repeat the procedure in 7 years. Her last mammogram was conducted in the fall of 2024. She had a bone density test in 10/2023, which showed minimal osteopenia. She is up to date on her pneumonia vaccine and the tetanus diphtheria whooping cough vaccines.    In 01/2025, her hormone doctor increased her hormone levels, which she believes has contributed to weight gain around her midsection. She is uncertain if this weight gain is hormone-related or due to the stress of caring for her . She is currently using the Vivelle-Dot patch twice a week, which was increased from 0.5 mg to 1 mg, and a progesterone cream at night, which was increased to 40/0.2 mg/0.5 mg. Her gynecologist is Yolanda Gonzalez, who specializes in bioidentical hormones. She is considering finding a new hormone specialist.    MEDICATIONS  Current: Cabergoline extended release, trospium, metformin, Vivelle-Dot patch, progesterone cream.  Discontinued: Pravastatin, Gemtesa, Vesicare, Myrbetriq, Ozempic.    IMMUNIZATIONS  She is up to date on the pneumonia vaccine and the tetanus diphtheria whooping cough vaccines.          Objective   Vital Signs:  /74 (BP Location: Left arm, Patient Position: Sitting, Cuff Size: Adult)   Pulse 88   Temp 98.4 °F (36.9 °C) (Infrared)   Ht 162.6 cm (64.02\")   Wt 84.6 kg (186 lb 6.4 oz)   SpO2 98%   BMI 31.98 kg/m²   Physical Exam  Vitals " and nursing note reviewed.   Constitutional:       Appearance: She is well-developed.   HENT:      Head: Normocephalic.   Eyes:      Conjunctiva/sclera: Conjunctivae normal.      Pupils: Pupils are equal, round, and reactive to light.   Neck:      Thyroid: No thyromegaly.   Cardiovascular:      Rate and Rhythm: Normal rate and regular rhythm.      Heart sounds: Normal heart sounds.   Pulmonary:      Effort: Pulmonary effort is normal.      Breath sounds: Normal breath sounds. No wheezing.   Chest:   Breasts:     Right: No inverted nipple, mass, nipple discharge, skin change or tenderness.      Left: No inverted nipple, mass, nipple discharge, skin change or tenderness.   Abdominal:      General: Bowel sounds are normal.      Palpations: Abdomen is soft.      Tenderness: There is no abdominal tenderness.   Musculoskeletal:         General: No tenderness. Normal range of motion.      Cervical back: Normal range of motion and neck supple.      Right hip: No tenderness. Normal range of motion.   Lymphadenopathy:      Cervical: No cervical adenopathy.   Skin:     General: Skin is warm and dry.      Findings: No rash.   Neurological:      Mental Status: She is alert and oriented to person, place, and time.      Cranial Nerves: No cranial nerve deficit.      Sensory: No sensory deficit.      Coordination: Coordination normal.      Gait: Gait normal.   Psychiatric:         Attention and Perception: Attention normal.         Mood and Affect: Mood normal.         Speech: Speech normal.         Behavior: Behavior normal.         Thought Content: Thought content normal.         Cognition and Memory: Cognition normal.         Judgment: Judgment normal.           Lungs were auscultated.  Breasts were examined.  Hip was examined.    Vital Signs  Weight is 186.    The following data was reviewed by: Elen Tovar MD on 04/10/2025:              Results  Imaging  Bone density test showed minimal osteopenia.    Testing  EKG shows  no change.           Assessment and Plan Additional age appropriate preventative wellness advice topics were discussed during today's preventative wellness exam(some topics already addressed during AWV portion of the note above):    Physical Activity: Advised cardiovascular activity 150 minutes per week as tolerated. (example brisk walk for 30 minutes, 5 days a week).     Nutrition: Discussed nutrition plan with patient. Information shared in after visit summary. Goal is for a well balanced diet to enhance overall health.     Healthy Weight: Discussed current and goal BMI with patient. Steps to attain this goal discussed. Information shared in after visit summary.       Mixed hyperlipidemia   she has discontinued pravastatin due to leg restlessness and twitching and has switched to Berberine extended release supplement.  She is encouraged to try to get more physical activity and exercise whenever she can.  She is encouraged to continue to improve low-fat healthy diet.    Right Hip pain.  The etiology of the hip pain could be multifactorial, potentially stemming from early arthritis, bursitis, or tendinitis. The pain is localized under the buttocks, suggesting a muscular origin. She is advised to continue with Aleve with food occasionally as needed for pain management. The application of a moist heat pack is also recommended for symptomatic relief.  She was given hip exercises to try.  We can do physical therapy if not improving.    Urinary frequency.  She reports that her current medication, trospium, is not effective. A prescription for tolterodyne will be provided to try in the evenings.  She is encouraged to consult with Dr. Mosley for further evaluation and potential additional testing.    Metabolic syndrome.  Class I obesity with BMI of 32.  Weight management.  Her weight has increased from 173 pounds in December 2024 to 186 pounds currently. She is advised to incorporate more exercise into her routine, even  if it is just a brisk walk for a few minutes daily. A prescription for semaglutide (Ozempic) will be provided, starting at the lowest dose of 0.25 mg. She is also advised to check the price on GoodRx.  She is currently taking metformin once daily but may increase it to twice daily if tolerated. If she experiences diarrhea or abdominal pain, she should revert to the once-daily dosage.    Statin intolerance.    Health maintenance.  She had a colonoscopy in June 2024, which revealed one polyp, and was advised to repeat the procedure in 7 years. Her last mammogram was conducted in the fall of 2024. She had a bone density test in October 2023, which showed minimal osteopenia. She is up to date on her pneumonia vaccine and the tetanus diphtheria whooping cough vaccines.    Hormone management.  She is currently using the Vivelle-Dot patch twice a week, which was increased from 0.5 mg to 1 mg, and a progesterone/testosterone  cream at night, which was increased to 40/0.2 mg/0.5 mg. She is seeing  Dulce Maria Gonzalez, who specializes in bioidentical hormones.     Follow-up  The patient will follow up in 6 months.    PROCEDURE  Colonoscopy in June 2024 revealed one polyp.         Follow Up   No follow-ups on file.  Patient was given instructions and counseling regarding her condition or for health maintenance advice. Please see specific information pulled into the AVS if appropriate.  Patient or patient representative verbalized consent for the use of Ambient Listening during the visit with  Elen Tovar MD for chart documentation. 4/10/2025  11:44 EDT

## 2025-04-10 NOTE — PATIENT INSTRUCTIONS
Problem List Items Addressed This Visit          Allergies and Adverse Reactions    Statin intolerance (Chronic)    Overview   Pravastatin caused restless legs.  Patient recalls taking atorvastatin many years ago and having muscle aching.  She does not recall trying any other statins.            Cardiac and Vasculature    Mixed hyperlipidemia (Chronic)    Overview   LDL 2022 was 183 with predominance of small LDL at 1513.  She has low HDL.  She has a strong family history of hyperlipidemia.  She has a brother who  of MI at age 65.  Several aunts and uncles on both parents side had heart disease.    Pravastatin caused restless legs.  Patient recalls taking atorvastatin many years ago and having muscle aching.             Relevant Orders    CBC & Differential    Comprehensive Metabolic Panel    Lipid Panel    Microalbumin / Creatinine Urine Ratio - Urine, Clean Catch    Urinalysis With Microscopic - Urine, Clean Catch    TSH    Vitamin B12       Endocrine and Metabolic    Metabolic syndrome (Chronic)    Overview   Metformin  twice a day with meals.           Relevant Orders    Hemoglobin A1c    Class 1 obesity due to excess calories with serious comorbidity and body mass index (BMI) of 32.0 to 32.9 in adult (Chronic)       Genitourinary and Reproductive     Urinary frequency (Chronic)       Musculoskeletal and Injuries    Pain of left hip (Chronic)     Other Visit Diagnoses         Medicare annual wellness visit, subsequent    -  Primary      Vitamin D deficiency        Relevant Orders    Vitamin D,25-Hydroxy      Abnormal finding of blood chemistry, unspecified        Relevant Orders    Hemoglobin A1c          Exercising to Stay Healthy  To become healthy and stay healthy, it is recommended that you do moderate-intensity and vigorous-intensity exercise. You can tell that you are exercising at a moderate intensity if your heart starts beating faster and you start breathing faster but can still hold a  conversation. You can tell that you are exercising at a vigorous intensity if you are breathing much harder and faster and cannot hold a conversation while exercising.  How can exercise benefit me?  Exercising regularly is important. It has many health benefits, such as:  Improving overall fitness, flexibility, and endurance.  Increasing bone density.  Helping with weight control.  Decreasing body fat.  Increasing muscle strength and endurance.  Reducing stress and tension, anxiety, depression, or anger.  Improving overall health.  What guidelines should I follow while exercising?  Before you start a new exercise program, talk with your health care provider.  Do not exercise so much that you hurt yourself, feel dizzy, or get very short of breath.  Wear comfortable clothes and wear shoes with good support.  Drink plenty of water while you exercise to prevent dehydration or heat stroke.  Work out until your breathing and your heartbeat get faster (moderate intensity).  How often should I exercise?  Choose an activity that you enjoy, and set realistic goals. Your health care provider can help you make an activity plan that is individually designed and works best for you.  Exercise regularly as told by your health care provider. This may include:  Doing strength training two times a week, such as:  Lifting weights.  Using resistance bands.  Push-ups.  Sit-ups.  Yoga.  Doing a certain intensity of exercise for a given amount of time. Choose from these options:  A total of 150 minutes of moderate-intensity exercise every week.  A total of 75 minutes of vigorous-intensity exercise every week.  A mix of moderate-intensity and vigorous-intensity exercise every week.  Children, pregnant women, people who have not exercised regularly, people who are overweight, and older adults may need to talk with a health care provider about what activities are safe to perform. If you have a medical condition, be sure to talk with your  health care provider before you start a new exercise program.  What are some exercise ideas?  Moderate-intensity exercise ideas include:  Walking 1 mile (1.6 km) in about 15 minutes.  Biking.  Hiking.  Golfing.  Dancing.  Water aerobics.  Vigorous-intensity exercise ideas include:  Walking 4.5 miles (7.2 km) or more in about 1 hour.  Jogging or running 5 miles (8 km) in about 1 hour.  Biking 10 miles (16.1 km) or more in about 1 hour.  Lap swimming.  Roller-skating or in-line skating.  Cross-country skiing.  Vigorous competitive sports, such as football, basketball, and soccer.  Jumping rope.  Aerobic dancing.  What are some everyday activities that can help me get exercise?  Yard work, such as:  Pushing a .  Raking and bagging leaves.  Washing your car.  Pushing a stroller.  Shoveling snow.  Gardening.  Washing windows or floors.  How can I be more active in my day-to-day activities?  Use stairs instead of an elevator.  Take a walk during your lunch break.  If you drive, park your car farther away from your work or school.  If you take public transportation, get off one stop early and walk the rest of the way.  Stand up or walk around during all of your indoor phone calls.  Get up, stretch, and walk around every 30 minutes throughout the day.  Enjoy exercise with a friend. Support to continue exercising will help you keep a regular routine of activity.  Where to find more information  You can find more information about exercising to stay healthy from:  [place] Department of Health and Human Services: www.hhs.gov  Centers for Disease Control and Prevention (CDC): www.cdc.gov  Summary  Exercising regularly is important. It will improve your overall fitness, flexibility, and endurance.  Regular exercise will also improve your overall health. It can help you control your weight, reduce stress, and improve your bone density.  Do not exercise so much that you hurt yourself, feel dizzy, or get very short of  "breath.  Before you start a new exercise program, talk with your health care provider.  This information is not intended to replace advice given to you by your health care provider. Make sure you discuss any questions you have with your health care provider.  Document Revised: 04/15/2022 Document Reviewed: 04/15/2022  [employer] Patient Education © 2023 [employer] BMI for Adults  Body mass index (BMI) is a number found using a person's weight and height. BMI can help tell how much of a person's weight is made up of fat. BMI does not measure body fat directly. It is used instead of tests that directly measure body fat, which can be difficult and expensive.  What are BMI measurements used for?  BMI is useful to:  Find out if your weight puts you at higher risk for medical problems.  Help recommend changes, such as in diet and exercise. This can help you reach a healthy weight. BMI screening can be done again to see if these changes are working.  How is BMI calculated?  Your height and weight are measured. The BMI is found from those numbers. This can be done with [place] or metric measurements. Note that charts and online BMI calculators are available to help you find your BMI quickly and easily without doing these calculations.  To calculate your BMI in [place] measurements:  Measure your weight in pounds (lb).  Multiply the number of pounds by 703.  So, for an adult who weighs 150 lb, multiply that number by 703: 150 x 703, which equals 105,450.  Measure your height in inches. Then multiply that number by itself to get a measurement called \"inches squared.\"  So, for an adult who is 70 inches tall, the \"inches squared\" measurement is 70 inches x 70 inches, which equals 4,900 inches squared.  Divide the total from step 2 (number of lb x 703) by the total from step 3 (inches squared): 105,450 ÷ 4,900 = 21.5. This is your BMI.  To calculate your BMI in metric measurements:    Measure your weight in kilograms (kg).  For this " "example, the weight is 70 kg.  Measure your height in meters (m). Then multiply that number by itself to get a measurement called \"meters squared.\"  So, for an adult who is 1.75 m tall, the \"meters squared\" measurement is 1.75 m x 1.75 m, which equals 3.1 meters squared.  Divide the number of kilograms (your weight) by the meters squared number. In this example: 70 ÷ 3.1 = 22.6. This is your BMI.  What do the results mean?  BMI charts are used to see if you are underweight, normal weight, overweight, or obese. The following guidelines will be used:  Underweight: BMI less than 18.5.  Normal weight: BMI between 18.5 and 24.9.  Overweight: BMI between 25 and 29.9.  Obese: BMI of 30 or above.  BMI is a tool and cannot diagnose a condition. Talk with your health care provider about what your BMI means for you. Keep these notes in mind:  Weight includes fat and muscle. Someone with a muscular build, such as an athlete, may have a BMI that is higher than 24.9. In cases like these, BMI is not a correct measure of body fat.  If you have a BMI of 25 or higher, your provider may need to do more testing to find out if excess body fat is the cause.  BMI is measured the same way for males and females. Females usually have more body fat than males of the same height and weight.  Where to find more information  For more information about BMI, including tools to quickly find your BMI, go to:  Centers for Disease Control and Prevention: cdc.gov  American Heart Association: heart.org  National Heart, Lung, and Blood [employer]: nhlbi.nih.gov  This information is not intended to replace advice given to you by your health care provider. Make sure you discuss any questions you have with your health care provider.  Document Revised: 09/07/2023 Document Reviewed: 08/31/2023  [employer] Patient Education © 2024 [employer]Heart-Healthy Eating Plan  Many factors influence your heart (coronary) health, including eating and exercise habits. " Coronary risk increases with abnormal blood fat (lipid) levels. Heart-healthy meal planning includes limiting unhealthy fats, increasing healthy fats, and making other diet and lifestyle changes.  What is my plan?  Your health care provider may recommend that you:  Limit your fat intake to _________% or less of your total calories each day.  Limit your saturated fat intake to _________% or less of your total calories each day.  Limit the amount of cholesterol in your diet to less than _________ mg per day.  What are tips for following this plan?  Cooking  Cook foods using methods other than frying. Baking, boiling, grilling, and broiling are all good options. Other ways to reduce fat include:  Removing the skin from poultry.  Removing all visible fats from meats.  Steaming vegetables in water or broth.  Meal planning  A plate with examples of foods in a healthy diet.      At meals, imagine dividing your plate into fourths:  Fill one-half of your plate with vegetables and green salads.  Fill one-fourth of your plate with whole grains.  Fill one-fourth of your plate with lean protein foods.  Eat 4-5 servings of vegetables per day. One serving equals 1 cup raw or cooked vegetable, or 2 cups raw leafy greens.  Eat 4-5 servings of fruit per day. One serving equals 1 medium whole fruit, ¼ cup dried fruit, ½ cup fresh, frozen, or canned fruit, or ½ cup 100% fruit juice.  Eat more foods that contain soluble fiber. Examples include apples, broccoli, carrots, beans, peas, and barley. Aim to get 25-30 g of fiber per day.  Increase your consumption of legumes, nuts, and seeds to 4-5 servings per week. One serving of dried beans or legumes equals ½ cup cooked, 1 serving of nuts is ¼ cup, and 1 serving of seeds equals 1 tablespoon.  Fats  Choose healthy fats more often. Choose monounsaturated and polyunsaturated fats, such as olive and canola oils, flaxseeds, walnuts, almonds, and seeds.  Eat more omega-3 fats. Choose salmon,  mackerel, sardines, tuna, flaxseed oil, and ground flaxseeds. Aim to eat fish at least 2 times each week.  Check food labels carefully to identify foods with trans fats or high amounts of saturated fat.  Limit saturated fats. These are found in animal products, such as meats, butter, and cream. Plant sources of saturated fats include palm oil, palm kernel oil, and coconut oil.  Avoid foods with partially hydrogenated oils in them. These contain trans fats. Examples are stick margarine, some tub margarines, cookies, crackers, and other baked goods.  Avoid fried foods.  General information  Eat more home-cooked food and less restaurant, buffet, and fast food.  Limit or avoid alcohol.  Limit foods that are high in starch and sugar.  Lose weight if you are overweight. Losing just 5-10% of your body weight can help your overall health and prevent diseases such as diabetes and heart disease.  Monitor your salt (sodium) intake, especially if you have high blood pressure. Talk with your health care provider about your sodium intake.  Try to incorporate more vegetarian meals weekly.  What foods can I eat?  Fruits  All fresh, canned (in natural juice), or frozen fruits.  Vegetables  Fresh or frozen vegetables (raw, steamed, roasted, or grilled). Green salads.  Grains  Most grains. Choose whole wheat and whole grains most of the time. Rice and pasta, including brown rice and pastas made with whole wheat.  Meats and other proteins  Lean, well-trimmed beef, veal, pork, and lamb. Chicken and turkey without skin. All fish and shellfish. Wild duck, rabbit, pheasant, and venison. Egg whites or low-cholesterol egg substitutes. Dried beans, peas, lentils, and tofu. Seeds and most nuts.  Dairy  Low-fat or nonfat cheeses, including ricotta and mozzarella. Skim or 1% milk (liquid, powdered, or evaporated). Buttermilk made with low-fat milk. Nonfat or low-fat yogurt.  Fats and oils  Non-hydrogenated (trans-free) margarines. Vegetable  oils, including soybean, sesame, sunflower, olive, peanut, safflower, corn, canola, and cottonseed. Salad dressings or mayonnaise made with a vegetable oil.  Beverages  Water (mineral or sparkling). Coffee and tea. Diet carbonated beverages.  Sweets and desserts  Sherbet, gelatin, and fruit ice. Small amounts of dark chocolate.  Limit all sweets and desserts.  Seasonings and condiments  All seasonings and condiments.  The items listed above may not be a complete list of foods and beverages you can eat. Contact a dietitian for more options.  What foods are not recommended?  Fruits  Canned fruit in heavy syrup. Fruit in cream or butter sauce. Fried fruit. Limit coconut.  Vegetables  Vegetables cooked in cheese, cream, or butter sauce. Fried vegetables.  Grains  Breads made with saturated or trans fats, oils, or whole milk. Croissants. Sweet rolls. Donuts. High-fat crackers, such as cheese crackers.  Meats and other proteins  Fatty meats, such as hot dogs, ribs, sausage, buckner, rib-eye roast or steak. High-fat deli meats, such as salami and bologna. Caviar. Domestic duck and goose. Organ meats, such as liver.  Dairy  Cream, sour cream, cream cheese, and creamed cottage cheese. Whole-milk cheeses. Whole or 2% milk (liquid, evaporated, or condensed). Whole buttermilk. Cream sauce or high-fat cheese sauce. Whole-milk yogurt.  Fats and oils  Meat fat, or shortening. Cocoa butter, hydrogenated oils, palm oil, coconut oil, palm kernel oil. Solid fats and shortenings, including buckner fat, salt pork, lard, and butter. Nondairy cream substitutes. Salad dressings with cheese or sour cream.  Beverages  Regular sodas and any drinks with added sugar.  Sweets and desserts  Frosting. Pudding. Cookies. Cakes. Pies. Milk chocolate or white chocolate. Buttered syrups. Full-fat ice cream or ice cream drinks.  The items listed above may not be a complete list of foods and beverages to avoid. Contact a dietitian for more  information.  Summary  Heart-healthy meal planning includes limiting unhealthy fats, increasing healthy fats, and making other diet and lifestyle changes.  Lose weight if you are overweight. Losing just 5-10% of your body weight can help your overall health and prevent diseases such as diabetes and heart disease.  Focus on eating a balance of foods, including fruits and vegetables, low-fat or nonfat dairy, lean protein, nuts and legumes, whole grains, and heart-healthy oils and fats.  This information is not intended to replace advice given to you by your health care provider. Make sure you discuss any questions you have with your health care provider.  Document Revised: 04/28/2022 Document Reviewed: 04/28/2022  [employer] Patient Education © 2022 [employer]

## 2025-05-27 ENCOUNTER — TELEPHONE (OUTPATIENT)
Dept: INTERNAL MEDICINE | Facility: CLINIC | Age: 70
End: 2025-05-27
Payer: COMMERCIAL

## 2025-05-27 NOTE — TELEPHONE ENCOUNTER
Caller: Stephani Bhardwaj    Relationship: Self    Best call back number: [phone]     What is the best time to reach you: ANY    Who are you requesting to speak with (clinical staff, provider,  specific staff member): NURSE    Do you know the name of the person who called: PATIENT    What was the call regarding: PATIENT HAVING CHEST PRESSURE, CONGESTION, COUGHING.  WAS SEEN AT URGENT CARE AND WAS GIVEN A Z PACK AND TESSALON PEARLS BUT NOT BETTER.  SAID LUNGS CLEAR AND NEGATIVE FOR FLU AND COVID.  WHAT TO DO?    Is it okay if the provider responds through MyChart: PHONE CALL PLEASE

## 2025-05-28 RX ORDER — LORATADINE 10 MG/1
10 TABLET ORAL DAILY
Qty: 90 TABLET | Refills: 1 | Status: SHIPPED | OUTPATIENT
Start: 2025-05-28

## 2025-05-28 NOTE — TELEPHONE ENCOUNTER
Called and spoke to PT, she didn't want to schedule an appt as it is too far of a drive. She was on the way to the Urgent Care when I spoke to her.     PT also requested a refill for her Claritin that she takes 1x daily in the mornings. She was unsure of the dosage she takes.

## 2025-06-04 ENCOUNTER — OFFICE VISIT (OUTPATIENT)
Dept: INTERNAL MEDICINE | Facility: CLINIC | Age: 70
End: 2025-06-04
Payer: MEDICARE

## 2025-06-04 VITALS
SYSTOLIC BLOOD PRESSURE: 120 MMHG | HEART RATE: 100 BPM | OXYGEN SATURATION: 95 % | BODY MASS INDEX: 31.82 KG/M2 | DIASTOLIC BLOOD PRESSURE: 64 MMHG | WEIGHT: 186.4 LBS | HEIGHT: 64 IN | TEMPERATURE: 98.7 F

## 2025-06-04 DIAGNOSIS — J06.9 UPPER RESPIRATORY TRACT INFECTION, UNSPECIFIED TYPE: Primary | ICD-10-CM

## 2025-06-04 DIAGNOSIS — R51.9 HEADACHE ABOVE THE EYE REGION: ICD-10-CM

## 2025-06-04 DIAGNOSIS — R05.1 ACUTE COUGH: ICD-10-CM

## 2025-06-04 DIAGNOSIS — E88.810 METABOLIC SYNDROME: Chronic | ICD-10-CM

## 2025-06-04 DIAGNOSIS — J02.9 ACUTE SORE THROAT: ICD-10-CM

## 2025-06-04 DIAGNOSIS — J30.2 PERENNIAL ALLERGIC RHINITIS WITH SEASONAL VARIATION: Chronic | ICD-10-CM

## 2025-06-04 DIAGNOSIS — J30.89 PERENNIAL ALLERGIC RHINITIS WITH SEASONAL VARIATION: Chronic | ICD-10-CM

## 2025-06-04 NOTE — PATIENT INSTRUCTIONS
Problem List Items Addressed This Visit          Allergies and Adverse Reactions    Perennial allergic rhinitis with seasonal variation (Chronic)    Overview   Nasacort nasal spray and loratadine            ENT    Upper respiratory tract infection - Primary    Relevant Medications    amoxicillin-clavulanate (AUGMENTIN) 875-125 MG per tablet       Endocrine and Metabolic    Metabolic syndrome (Chronic)    Overview   Metformin  twice a day with meals.              Infectious Diseases    Acute sore throat       Neuro    Headache above the eye region       Other    Acute cough     Exercising to Stay Healthy  To become healthy and stay healthy, it is recommended that you do moderate-intensity and vigorous-intensity exercise. You can tell that you are exercising at a moderate intensity if your heart starts beating faster and you start breathing faster but can still hold a conversation. You can tell that you are exercising at a vigorous intensity if you are breathing much harder and faster and cannot hold a conversation while exercising.  How can exercise benefit me?  Exercising regularly is important. It has many health benefits, such as:  Improving overall fitness, flexibility, and endurance.  Increasing bone density.  Helping with weight control.  Decreasing body fat.  Increasing muscle strength and endurance.  Reducing stress and tension, anxiety, depression, or anger.  Improving overall health.  What guidelines should I follow while exercising?  Before you start a new exercise program, talk with your health care provider.  Do not exercise so much that you hurt yourself, feel dizzy, or get very short of breath.  Wear comfortable clothes and wear shoes with good support.  Drink plenty of water while you exercise to prevent dehydration or heat stroke.  Work out until your breathing and your heartbeat get faster (moderate intensity).  How often should I exercise?  Choose an activity that you enjoy, and set realistic  goals. Your health care provider can help you make an activity plan that is individually designed and works best for you.  Exercise regularly as told by your health care provider. This may include:  Doing strength training two times a week, such as:  Lifting weights.  Using resistance bands.  Push-ups.  Sit-ups.  Yoga.  Doing a certain intensity of exercise for a given amount of time. Choose from these options:  A total of 150 minutes of moderate-intensity exercise every week.  A total of 75 minutes of vigorous-intensity exercise every week.  A mix of moderate-intensity and vigorous-intensity exercise every week.  Children, pregnant women, people who have not exercised regularly, people who are overweight, and older adults may need to talk with a health care provider about what activities are safe to perform. If you have a medical condition, be sure to talk with your health care provider before you start a new exercise program.  What are some exercise ideas?  Moderate-intensity exercise ideas include:  Walking 1 mile (1.6 km) in about 15 minutes.  Biking.  Hiking.  Golfing.  Dancing.  Water aerobics.  Vigorous-intensity exercise ideas include:  Walking 4.5 miles (7.2 km) or more in about 1 hour.  Jogging or running 5 miles (8 km) in about 1 hour.  Biking 10 miles (16.1 km) or more in about 1 hour.  Lap swimming.  Roller-skating or in-line skating.  Cross-country skiing.  Vigorous competitive sports, such as football, basketball, and soccer.  Jumping rope.  Aerobic dancing.  What are some everyday activities that can help me get exercise?  Yard work, such as:  Pushing a .  Raking and bagging leaves.  Washing your car.  Pushing a stroller.  Shoveling snow.  Gardening.  Washing windows or floors.  How can I be more active in my day-to-day activities?  Use stairs instead of an elevator.  Take a walk during your lunch break.  If you drive, park your car farther away from your work or school.  If you take public  transportation, get off one stop early and walk the rest of the way.  Stand up or walk around during all of your indoor phone calls.  Get up, stretch, and walk around every 30 minutes throughout the day.  Enjoy exercise with a friend. Support to continue exercising will help you keep a regular routine of activity.  Where to find more information  You can find more information about exercising to stay healthy from:  [place] Department of Health and Human Services: www.hhs.gov  Centers for Disease Control and Prevention (CDC): www.cdc.gov  Summary  Exercising regularly is important. It will improve your overall fitness, flexibility, and endurance.  Regular exercise will also improve your overall health. It can help you control your weight, reduce stress, and improve your bone density.  Do not exercise so much that you hurt yourself, feel dizzy, or get very short of breath.  Before you start a new exercise program, talk with your health care provider.  This information is not intended to replace advice given to you by your health care provider. Make sure you discuss any questions you have with your health care provider.  Document Revised: 04/15/2022 Document Reviewed: 04/15/2022  [employer] Patient Education © 2023 [employer] BMI for Adults  Body mass index (BMI) is a number found using a person's weight and height. BMI can help tell how much of a person's weight is made up of fat. BMI does not measure body fat directly. It is used instead of tests that directly measure body fat, which can be difficult and expensive.  What are BMI measurements used for?  BMI is useful to:  Find out if your weight puts you at higher risk for medical problems.  Help recommend changes, such as in diet and exercise. This can help you reach a healthy weight. BMI screening can be done again to see if these changes are working.  How is BMI calculated?  Your height and weight are measured. The BMI is found from those numbers. This can be done  "with [place] or metric measurements. Note that charts and online BMI calculators are available to help you find your BMI quickly and easily without doing these calculations.  To calculate your BMI in [place] measurements:  Measure your weight in pounds (lb).  Multiply the number of pounds by 703.  So, for an adult who weighs 150 lb, multiply that number by 703: 150 x 703, which equals 105,450.  Measure your height in inches. Then multiply that number by itself to get a measurement called \"inches squared.\"  So, for an adult who is 70 inches tall, the \"inches squared\" measurement is 70 inches x 70 inches, which equals 4,900 inches squared.  Divide the total from step 2 (number of lb x 703) by the total from step 3 (inches squared): 105,450 ÷ 4,900 = 21.5. This is your BMI.  To calculate your BMI in metric measurements:    Measure your weight in kilograms (kg).  For this example, the weight is 70 kg.  Measure your height in meters (m). Then multiply that number by itself to get a measurement called \"meters squared.\"  So, for an adult who is 1.75 m tall, the \"meters squared\" measurement is 1.75 m x 1.75 m, which equals 3.1 meters squared.  Divide the number of kilograms (your weight) by the meters squared number. In this example: 70 ÷ 3.1 = 22.6. This is your BMI.  What do the results mean?  BMI charts are used to see if you are underweight, normal weight, overweight, or obese. The following guidelines will be used:  Underweight: BMI less than 18.5.  Normal weight: BMI between 18.5 and 24.9.  Overweight: BMI between 25 and 29.9.  Obese: BMI of 30 or above.  BMI is a tool and cannot diagnose a condition. Talk with your health care provider about what your BMI means for you. Keep these notes in mind:  Weight includes fat and muscle. Someone with a muscular build, such as an athlete, may have a BMI that is higher than 24.9. In cases like these, BMI is not a correct measure of body fat.  If you have a BMI of 25 or higher, your " provider may need to do more testing to find out if excess body fat is the cause.  BMI is measured the same way for males and females. Females usually have more body fat than males of the same height and weight.  Where to find more information  For more information about BMI, including tools to quickly find your BMI, go to:  Centers for Disease Control and Prevention: cdc.gov  American Heart Association: heart.org  National Heart, Lung, and Blood [employer]: nhlbi.nih.gov  This information is not intended to replace advice given to you by your health care provider. Make sure you discuss any questions you have with your health care provider.  Document Revised: 09/07/2023 Document Reviewed: 08/31/2023  [employer] Patient Education © 2024 [employer]Heart-Healthy Eating Plan  Many factors influence your heart (coronary) health, including eating and exercise habits. Coronary risk increases with abnormal blood fat (lipid) levels. Heart-healthy meal planning includes limiting unhealthy fats, increasing healthy fats, and making other diet and lifestyle changes.  What is my plan?  Your health care provider may recommend that you:  Limit your fat intake to _________% or less of your total calories each day.  Limit your saturated fat intake to _________% or less of your total calories each day.  Limit the amount of cholesterol in your diet to less than _________ mg per day.  What are tips for following this plan?  Cooking  Cook foods using methods other than frying. Baking, boiling, grilling, and broiling are all good options. Other ways to reduce fat include:  Removing the skin from poultry.  Removing all visible fats from meats.  Steaming vegetables in water or broth.  Meal planning  A plate with examples of foods in a healthy diet.      At meals, imagine dividing your plate into fourths:  Fill one-half of your plate with vegetables and green salads.  Fill one-fourth of your plate with whole grains.  Fill one-fourth of your  plate with lean protein foods.  Eat 4-5 servings of vegetables per day. One serving equals 1 cup raw or cooked vegetable, or 2 cups raw leafy greens.  Eat 4-5 servings of fruit per day. One serving equals 1 medium whole fruit, ¼ cup dried fruit, ½ cup fresh, frozen, or canned fruit, or ½ cup 100% fruit juice.  Eat more foods that contain soluble fiber. Examples include apples, broccoli, carrots, beans, peas, and barley. Aim to get 25-30 g of fiber per day.  Increase your consumption of legumes, nuts, and seeds to 4-5 servings per week. One serving of dried beans or legumes equals ½ cup cooked, 1 serving of nuts is ¼ cup, and 1 serving of seeds equals 1 tablespoon.  Fats  Choose healthy fats more often. Choose monounsaturated and polyunsaturated fats, such as olive and canola oils, flaxseeds, walnuts, almonds, and seeds.  Eat more omega-3 fats. Choose salmon, mackerel, sardines, tuna, flaxseed oil, and ground flaxseeds. Aim to eat fish at least 2 times each week.  Check food labels carefully to identify foods with trans fats or high amounts of saturated fat.  Limit saturated fats. These are found in animal products, such as meats, butter, and cream. Plant sources of saturated fats include palm oil, palm kernel oil, and coconut oil.  Avoid foods with partially hydrogenated oils in them. These contain trans fats. Examples are stick margarine, some tub margarines, cookies, crackers, and other baked goods.  Avoid fried foods.  General information  Eat more home-cooked food and less restaurant, buffet, and fast food.  Limit or avoid alcohol.  Limit foods that are high in starch and sugar.  Lose weight if you are overweight. Losing just 5-10% of your body weight can help your overall health and prevent diseases such as diabetes and heart disease.  Monitor your salt (sodium) intake, especially if you have high blood pressure. Talk with your health care provider about your sodium intake.  Try to incorporate more vegetarian  meals weekly.  What foods can I eat?  Fruits  All fresh, canned (in natural juice), or frozen fruits.  Vegetables  Fresh or frozen vegetables (raw, steamed, roasted, or grilled). Green salads.  Grains  Most grains. Choose whole wheat and whole grains most of the time. Rice and pasta, including brown rice and pastas made with whole wheat.  Meats and other proteins  Lean, well-trimmed beef, veal, pork, and lamb. Chicken and turkey without skin. All fish and shellfish. Wild duck, rabbit, pheasant, and venison. Egg whites or low-cholesterol egg substitutes. Dried beans, peas, lentils, and tofu. Seeds and most nuts.  Dairy  Low-fat or nonfat cheeses, including ricotta and mozzarella. Skim or 1% milk (liquid, powdered, or evaporated). Buttermilk made with low-fat milk. Nonfat or low-fat yogurt.  Fats and oils  Non-hydrogenated (trans-free) margarines. Vegetable oils, including soybean, sesame, sunflower, olive, peanut, safflower, corn, canola, and cottonseed. Salad dressings or mayonnaise made with a vegetable oil.  Beverages  Water (mineral or sparkling). Coffee and tea. Diet carbonated beverages.  Sweets and desserts  Sherbet, gelatin, and fruit ice. Small amounts of dark chocolate.  Limit all sweets and desserts.  Seasonings and condiments  All seasonings and condiments.  The items listed above may not be a complete list of foods and beverages you can eat. Contact a dietitian for more options.  What foods are not recommended?  Fruits  Canned fruit in heavy syrup. Fruit in cream or butter sauce. Fried fruit. Limit coconut.  Vegetables  Vegetables cooked in cheese, cream, or butter sauce. Fried vegetables.  Grains  Breads made with saturated or trans fats, oils, or whole milk. Croissants. Sweet rolls. Donuts. High-fat crackers, such as cheese crackers.  Meats and other proteins  Fatty meats, such as hot dogs, ribs, sausage, buckner, rib-eye roast or steak. High-fat deli meats, such as salami and bologna. Caviar. Domestic  duck and goose. Organ meats, such as liver.  Dairy  Cream, sour cream, cream cheese, and creamed cottage cheese. Whole-milk cheeses. Whole or 2% milk (liquid, evaporated, or condensed). Whole buttermilk. Cream sauce or high-fat cheese sauce. Whole-milk yogurt.  Fats and oils  Meat fat, or shortening. Cocoa butter, hydrogenated oils, palm oil, coconut oil, palm kernel oil. Solid fats and shortenings, including buckner fat, salt pork, lard, and butter. Nondairy cream substitutes. Salad dressings with cheese or sour cream.  Beverages  Regular sodas and any drinks with added sugar.  Sweets and desserts  Frosting. Pudding. Cookies. Cakes. Pies. Milk chocolate or white chocolate. Buttered syrups. Full-fat ice cream or ice cream drinks.  The items listed above may not be a complete list of foods and beverages to avoid. Contact a dietitian for more information.  Summary  Heart-healthy meal planning includes limiting unhealthy fats, increasing healthy fats, and making other diet and lifestyle changes.  Lose weight if you are overweight. Losing just 5-10% of your body weight can help your overall health and prevent diseases such as diabetes and heart disease.  Focus on eating a balance of foods, including fruits and vegetables, low-fat or nonfat dairy, lean protein, nuts and legumes, whole grains, and heart-healthy oils and fats.  This information is not intended to replace advice given to you by your health care provider. Make sure you discuss any questions you have with your health care provider.  Document Revised: 04/28/2022 Document Reviewed: 04/28/2022  [employer] Patient Education © 2022 [employer]
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
## 2025-07-21 ENCOUNTER — TELEPHONE (OUTPATIENT)
Dept: INTERNAL MEDICINE | Facility: CLINIC | Age: 70
End: 2025-07-21
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
## 2025-07-24 ENCOUNTER — LAB (OUTPATIENT)
Dept: LAB | Facility: HOSPITAL | Age: 70
End: 2025-07-24
Payer: MEDICARE

## 2025-07-24 DIAGNOSIS — E78.2 MIXED HYPERLIPIDEMIA: ICD-10-CM

## 2025-07-24 DIAGNOSIS — E88.810 METABOLIC SYNDROME: Chronic | ICD-10-CM

## 2025-07-24 DIAGNOSIS — R79.9 ABNORMAL FINDING OF BLOOD CHEMISTRY, UNSPECIFIED: ICD-10-CM

## 2025-07-24 DIAGNOSIS — E55.9 VITAMIN D DEFICIENCY: ICD-10-CM

## 2025-07-24 LAB
25(OH)D3 SERPL-MCNC: 69.9 NG/ML (ref 30–100)
ALBUMIN SERPL-MCNC: 4.8 G/DL (ref 3.5–5.2)
ALBUMIN UR-MCNC: <1.2 MG/DL
ALBUMIN/GLOB SERPL: 1.8 G/DL
ALP SERPL-CCNC: 56 U/L (ref 39–117)
ALT SERPL W P-5'-P-CCNC: 18 U/L (ref 1–33)
ANION GAP SERPL CALCULATED.3IONS-SCNC: 12 MMOL/L (ref 5–15)
AST SERPL-CCNC: 20 U/L (ref 1–32)
BACTERIA UR QL AUTO: ABNORMAL /HPF
BASOPHILS # BLD AUTO: 0.05 10*3/MM3 (ref 0–0.2)
BASOPHILS NFR BLD AUTO: 0.6 % (ref 0–1.5)
BILIRUB SERPL-MCNC: 0.5 MG/DL (ref 0–1.2)
BILIRUB UR QL STRIP: NEGATIVE
BUN SERPL-MCNC: 16 MG/DL (ref 8–23)
BUN/CREAT SERPL: 20.5 (ref 7–25)
CALCIUM SPEC-SCNC: 9.9 MG/DL (ref 8.6–10.5)
CHLORIDE SERPL-SCNC: 100 MMOL/L (ref 98–107)
CHOLEST SERPL-MCNC: 230 MG/DL (ref 0–200)
CLARITY UR: CLEAR
CO2 SERPL-SCNC: 24 MMOL/L (ref 22–29)
COLOR UR: YELLOW
CREAT SERPL-MCNC: 0.78 MG/DL (ref 0.57–1)
CREAT UR-MCNC: 62.2 MG/DL
DEPRECATED RDW RBC AUTO: 39 FL (ref 37–54)
EGFRCR SERPLBLD CKD-EPI 2021: 83.4 ML/MIN/1.73
EOSINOPHIL # BLD AUTO: 0.09 10*3/MM3 (ref 0–0.4)
EOSINOPHIL NFR BLD AUTO: 1.2 % (ref 0.3–6.2)
ERYTHROCYTE [DISTWIDTH] IN BLOOD BY AUTOMATED COUNT: 12.4 % (ref 12.3–15.4)
GLOBULIN UR ELPH-MCNC: 2.6 GM/DL
GLUCOSE SERPL-MCNC: 79 MG/DL (ref 65–99)
GLUCOSE UR STRIP-MCNC: NEGATIVE MG/DL
HBA1C MFR BLD: 5 % (ref 4.8–5.6)
HCT VFR BLD AUTO: 46.2 % (ref 34–46.6)
HDLC SERPL-MCNC: 55 MG/DL (ref 40–60)
HGB BLD-MCNC: 15.7 G/DL (ref 12–15.9)
HGB UR QL STRIP.AUTO: NEGATIVE
HYALINE CASTS UR QL AUTO: ABNORMAL /LPF
IMM GRANULOCYTES # BLD AUTO: 0.02 10*3/MM3 (ref 0–0.05)
IMM GRANULOCYTES NFR BLD AUTO: 0.3 % (ref 0–0.5)
KETONES UR QL STRIP: NEGATIVE
LDLC SERPL CALC-MCNC: 159 MG/DL (ref 0–100)
LDLC/HDLC SERPL: 2.85 {RATIO}
LEUKOCYTE ESTERASE UR QL STRIP.AUTO: NEGATIVE
LYMPHOCYTES # BLD AUTO: 1.64 10*3/MM3 (ref 0.7–3.1)
LYMPHOCYTES NFR BLD AUTO: 21.2 % (ref 19.6–45.3)
MCH RBC QN AUTO: 29.9 PG (ref 26.6–33)
MCHC RBC AUTO-ENTMCNC: 34 G/DL (ref 31.5–35.7)
MCV RBC AUTO: 88 FL (ref 79–97)
MICROALBUMIN/CREAT UR: NORMAL MG/G{CREAT}
MONOCYTES # BLD AUTO: 0.74 10*3/MM3 (ref 0.1–0.9)
MONOCYTES NFR BLD AUTO: 9.6 % (ref 5–12)
NEUTROPHILS NFR BLD AUTO: 5.18 10*3/MM3 (ref 1.7–7)
NEUTROPHILS NFR BLD AUTO: 67.1 % (ref 42.7–76)
NITRITE UR QL STRIP: NEGATIVE
NRBC BLD AUTO-RTO: 0 /100 WBC (ref 0–0.2)
PH UR STRIP.AUTO: 6 [PH] (ref 5–8)
PLATELET # BLD AUTO: 276 10*3/MM3 (ref 140–450)
PMV BLD AUTO: 9.2 FL (ref 6–12)
POTASSIUM SERPL-SCNC: 3.9 MMOL/L (ref 3.5–5.2)
PROT SERPL-MCNC: 7.4 G/DL (ref 6–8.5)
PROT UR QL STRIP: NEGATIVE
RBC # BLD AUTO: 5.25 10*6/MM3 (ref 3.77–5.28)
RBC # UR STRIP: ABNORMAL /HPF
REF LAB TEST METHOD: ABNORMAL
SODIUM SERPL-SCNC: 136 MMOL/L (ref 136–145)
SP GR UR STRIP: 1.02 (ref 1–1.03)
SQUAMOUS #/AREA URNS HPF: ABNORMAL /HPF
TRIGL SERPL-MCNC: 91 MG/DL (ref 0–150)
TSH SERPL DL<=0.05 MIU/L-ACNC: 2.54 UIU/ML (ref 0.27–4.2)
UROBILINOGEN UR QL STRIP: NORMAL
VIT B12 BLD-MCNC: 714 PG/ML (ref 211–946)
VLDLC SERPL-MCNC: 16 MG/DL (ref 5–40)
WBC # UR STRIP: ABNORMAL /HPF
WBC NRBC COR # BLD AUTO: 7.72 10*3/MM3 (ref 3.4–10.8)

## 2025-07-24 PROCEDURE — 84443 ASSAY THYROID STIM HORMONE: CPT

## 2025-07-24 PROCEDURE — 36415 COLL VENOUS BLD VENIPUNCTURE: CPT | Performed by: INTERNAL MEDICINE

## 2025-07-24 PROCEDURE — 82607 VITAMIN B-12: CPT

## 2025-07-24 PROCEDURE — 82306 VITAMIN D 25 HYDROXY: CPT

## 2025-07-24 PROCEDURE — 86618 LYME DISEASE ANTIBODY: CPT | Performed by: INTERNAL MEDICINE

## 2025-07-24 PROCEDURE — 82043 UR ALBUMIN QUANTITATIVE: CPT

## 2025-07-24 PROCEDURE — 87798 DETECT AGENT NOS DNA AMP: CPT | Performed by: INTERNAL MEDICINE

## 2025-07-24 PROCEDURE — 87484 EHRLICHA CHAFFEENSIS AMP PRB: CPT | Performed by: INTERNAL MEDICINE

## 2025-07-24 PROCEDURE — 85025 COMPLETE CBC W/AUTO DIFF WBC: CPT

## 2025-07-24 PROCEDURE — 80061 LIPID PANEL: CPT

## 2025-07-24 PROCEDURE — 81001 URINALYSIS AUTO W/SCOPE: CPT

## 2025-07-24 PROCEDURE — 87468 ANAPLSMA PHGCYTOPHLM AMP PRB: CPT | Performed by: INTERNAL MEDICINE

## 2025-07-24 PROCEDURE — 80053 COMPREHEN METABOLIC PANEL: CPT

## 2025-07-24 PROCEDURE — 82570 ASSAY OF URINE CREATININE: CPT

## 2025-07-24 PROCEDURE — 83036 HEMOGLOBIN GLYCOSYLATED A1C: CPT

## 2025-07-25 LAB — B BURGDOR IGG+IGM SER QL IA: NEGATIVE

## 2025-07-28 LAB
A PHAGOCYTOPH DNA BLD QL NAA+PROBE: NEGATIVE
EHRLICHIA DNA SPEC QL NAA+PROBE: NEGATIVE

## 2025-07-29 LAB — RICKETTSIA RICKETTSII DNA, RT: NOT DETECTED

## 2025-08-27 DIAGNOSIS — U07.1 COVID-19 VIRUS INFECTION: Primary | ICD-10-CM

## (undated) DEVICE — SUT MONOCRYL PLS ANTIB UND 3/0  PS1 27IN

## (undated) DEVICE — GLV SURG SENSICARE PI ORTHO SZ8 LF STRL

## (undated) DEVICE — NDL HYPO ECLPS SFTY 18G 1 1/2IN

## (undated) DEVICE — PAD ARMBRD SURG CONVOL 7.5X20X2IN

## (undated) DEVICE — IRRIGATOR BULB ASEPTO 60CC STRL

## (undated) DEVICE — BNDG ELAS W/CLIP 6IN 10YD LF STRL

## (undated) DEVICE — PATIENT RETURN ELECTRODE, SINGLE-USE, CONTACT QUALITY MONITORING, ADULT, WITH 9FT CORD, FOR PATIENTS WEIGING OVER 33LBS. (15KG): Brand: MEGADYNE

## (undated) DEVICE — BNDG ELAS CO-FLEX SLF ADHR 6IN 5YD LF STRL

## (undated) DEVICE — TRAP FLD MINIVAC MEGADYNE 100ML

## (undated) DEVICE — UNDERCAST PADDING: Brand: DEROYAL

## (undated) DEVICE — UNDERGLV SURG BIOGEL INDICAT PI SZ8 BLU

## (undated) DEVICE — STERILE PVP: Brand: MEDLINE INDUSTRIES, INC.

## (undated) DEVICE — SYRINGE, LUER LOCK, 5ML: Brand: MEDLINE

## (undated) DEVICE — SPNG LAP PREWSH SFTPK 18X18IN STRL PK/5

## (undated) DEVICE — ANTIBACTERIAL UNDYED BRAIDED (POLYGLACTIN 910), SYNTHETIC ABSORBABLE SUTURE: Brand: COATED VICRYL

## (undated) DEVICE — BLANKT WARM UPPR/BDY ARM/OUT 57X196CM

## (undated) DEVICE — NEEDLE, QUINCKE 22GX3.5": Brand: MEDLINE INDUSTRIES, INC.

## (undated) DEVICE — TB SXN FRAZIER 12F STRL

## (undated) DEVICE — STRYKER PERFORMANCE SERIES SAGITTAL BLADE: Brand: STRYKER PERFORMANCE SERIES

## (undated) DEVICE — SYR LUERLOK 30CC

## (undated) DEVICE — DISPOSABLE TOURNIQUET CUFF SINGLE BLADDER, DUAL PORT AND QUICK CONNECT CONNECTOR: Brand: COLOR CUFF

## (undated) DEVICE — PIN HOLD TEMP NOHEAD FLUT 1/8X3.5IN

## (undated) DEVICE — ADHS SKIN PREMIERPRO EXOFIN TOPICAL HI/VISC .5ML

## (undated) DEVICE — GLV SURG SENSICARE PI MIC PF SZ9 LF STRL

## (undated) DEVICE — SUT VIC 1 CTX 36IN OBGYN VCP977H

## (undated) DEVICE — PK KN TOTL 10

## (undated) DEVICE — TRY EPID SFTY 18G 3.5IN 1T7680

## (undated) DEVICE — DRSNG SURG AQUACEL AG/ADVNTGE 9X25CM 3.5X10IN

## (undated) DEVICE — KT PUMP INFUBLOCK MDL 2100 PMKITSOLIS